# Patient Record
Sex: FEMALE | Race: WHITE | NOT HISPANIC OR LATINO
[De-identification: names, ages, dates, MRNs, and addresses within clinical notes are randomized per-mention and may not be internally consistent; named-entity substitution may affect disease eponyms.]

---

## 2017-01-31 ENCOUNTER — APPOINTMENT (OUTPATIENT)
Dept: OTOLARYNGOLOGY | Facility: CLINIC | Age: 62
End: 2017-01-31

## 2018-02-01 ENCOUNTER — INPATIENT (INPATIENT)
Facility: HOSPITAL | Age: 63
LOS: 1 days | Discharge: HOME | End: 2018-02-03
Payer: COMMERCIAL

## 2018-02-01 PROCEDURE — 99223 1ST HOSP IP/OBS HIGH 75: CPT

## 2018-02-01 PROCEDURE — 70487 CT MAXILLOFACIAL W/DYE: CPT | Mod: 26

## 2018-02-01 PROCEDURE — 99291 CRITICAL CARE FIRST HOUR: CPT

## 2018-02-01 PROCEDURE — 70450 CT HEAD/BRAIN W/O DYE: CPT | Mod: 26

## 2018-02-02 ENCOUNTER — OUTPATIENT (OUTPATIENT)
Dept: OUTPATIENT SERVICES | Facility: HOSPITAL | Age: 63
LOS: 1 days | End: 2018-02-02

## 2018-02-02 PROCEDURE — 70549 MR ANGIOGRAPH NECK W/O&W/DYE: CPT | Mod: 26

## 2018-02-02 PROCEDURE — 99232 SBSQ HOSP IP/OBS MODERATE 35: CPT

## 2018-02-02 PROCEDURE — 70553 MRI BRAIN STEM W/O & W/DYE: CPT | Mod: 26

## 2018-02-03 ENCOUNTER — OUTPATIENT (OUTPATIENT)
Dept: OUTPATIENT SERVICES | Facility: HOSPITAL | Age: 63
LOS: 1 days | End: 2018-02-03

## 2018-02-04 ENCOUNTER — EMERGENCY (EMERGENCY)
Facility: HOSPITAL | Age: 63
LOS: 1 days | End: 2018-02-04
Payer: COMMERCIAL

## 2018-02-04 PROCEDURE — 99285 EMERGENCY DEPT VISIT HI MDM: CPT

## 2018-02-04 PROCEDURE — 71275 CT ANGIOGRAPHY CHEST: CPT | Mod: 26

## 2018-02-04 PROCEDURE — 71046 X-RAY EXAM CHEST 2 VIEWS: CPT | Mod: 26

## 2018-02-10 ENCOUNTER — APPOINTMENT (OUTPATIENT)
Dept: OTOLARYNGOLOGY | Facility: CLINIC | Age: 63
End: 2018-02-10
Payer: COMMERCIAL

## 2018-02-10 DIAGNOSIS — H90.41 SENSORINEURAL HEARING LOSS, UNILATERAL, RIGHT EAR, WITH UNRESTRICTED HEARING ON THE CONTRALATERAL SIDE: ICD-10-CM

## 2018-02-10 DIAGNOSIS — H90.3 SENSORINEURAL HEARING LOSS, BILATERAL: ICD-10-CM

## 2018-02-10 DIAGNOSIS — R51 HEADACHE: ICD-10-CM

## 2018-02-10 PROCEDURE — 92557 COMPREHENSIVE HEARING TEST: CPT

## 2018-02-10 PROCEDURE — 92567 TYMPANOMETRY: CPT

## 2018-02-10 PROCEDURE — 99215 OFFICE O/P EST HI 40 MIN: CPT | Mod: 25

## 2018-02-11 PROBLEM — R51 RIGHT FACIAL PAIN: Status: ACTIVE | Noted: 2018-02-11

## 2018-02-11 RX ORDER — FLUOXETINE HYDROCHLORIDE 20 MG/1
20 CAPSULE ORAL
Qty: 30 | Refills: 0 | Status: ACTIVE | COMMUNITY
Start: 2018-01-26

## 2018-02-11 RX ORDER — LABETALOL HYDROCHLORIDE 100 MG/1
100 TABLET, FILM COATED ORAL
Qty: 30 | Refills: 0 | Status: ACTIVE | COMMUNITY
Start: 2017-12-14

## 2018-03-05 ENCOUNTER — APPOINTMENT (OUTPATIENT)
Dept: NEUROSURGERY | Facility: CLINIC | Age: 63
End: 2018-03-05
Payer: COMMERCIAL

## 2018-03-05 VITALS
TEMPERATURE: 98.4 F | WEIGHT: 168 LBS | HEIGHT: 67 IN | DIASTOLIC BLOOD PRESSURE: 116 MMHG | HEART RATE: 83 BPM | BODY MASS INDEX: 26.37 KG/M2 | SYSTOLIC BLOOD PRESSURE: 193 MMHG | RESPIRATION RATE: 18 BRPM | OXYGEN SATURATION: 97 %

## 2018-03-05 DIAGNOSIS — Z56.0 UNEMPLOYMENT, UNSPECIFIED: ICD-10-CM

## 2018-03-05 DIAGNOSIS — H91.93 UNSPECIFIED HEARING LOSS, BILATERAL: ICD-10-CM

## 2018-03-05 DIAGNOSIS — Z86.69 PERSONAL HISTORY OF OTHER DISEASES OF THE NERVOUS SYSTEM AND SENSE ORGANS: ICD-10-CM

## 2018-03-05 DIAGNOSIS — Z85.3 PERSONAL HISTORY OF MALIGNANT NEOPLASM OF BREAST: ICD-10-CM

## 2018-03-05 DIAGNOSIS — Z86.79 PERSONAL HISTORY OF OTHER DISEASES OF THE CIRCULATORY SYSTEM: ICD-10-CM

## 2018-03-05 DIAGNOSIS — Z78.9 OTHER SPECIFIED HEALTH STATUS: ICD-10-CM

## 2018-03-05 DIAGNOSIS — Z87.891 PERSONAL HISTORY OF NICOTINE DEPENDENCE: ICD-10-CM

## 2018-03-05 PROCEDURE — 99205 OFFICE O/P NEW HI 60 MIN: CPT

## 2018-03-05 SDOH — ECONOMIC STABILITY - INCOME SECURITY: UNEMPLOYMENT, UNSPECIFIED: Z56.0

## 2018-03-17 ENCOUNTER — INPATIENT (INPATIENT)
Facility: HOSPITAL | Age: 63
LOS: 1 days | Discharge: HOME | End: 2018-03-19
Payer: COMMERCIAL

## 2018-03-17 ENCOUNTER — OUTPATIENT (OUTPATIENT)
Dept: OUTPATIENT SERVICES | Facility: HOSPITAL | Age: 63
LOS: 1 days | End: 2018-03-17

## 2018-03-17 PROCEDURE — 76770 US EXAM ABDO BACK WALL COMP: CPT | Mod: 26

## 2018-03-17 PROCEDURE — 76856 US EXAM PELVIC COMPLETE: CPT | Mod: 26

## 2018-03-17 PROCEDURE — 71045 X-RAY EXAM CHEST 1 VIEW: CPT | Mod: 26

## 2018-03-17 PROCEDURE — 74176 CT ABD & PELVIS W/O CONTRAST: CPT | Mod: 26

## 2018-03-17 PROCEDURE — 99285 EMERGENCY DEPT VISIT HI MDM: CPT

## 2018-03-17 PROCEDURE — 70450 CT HEAD/BRAIN W/O DYE: CPT | Mod: 26

## 2018-03-18 ENCOUNTER — OUTPATIENT (OUTPATIENT)
Dept: OUTPATIENT SERVICES | Facility: HOSPITAL | Age: 63
LOS: 1 days | End: 2018-03-18

## 2018-03-19 ENCOUNTER — OUTPATIENT (OUTPATIENT)
Dept: OUTPATIENT SERVICES | Facility: HOSPITAL | Age: 63
LOS: 1 days | End: 2018-03-19

## 2018-03-29 ENCOUNTER — FORM ENCOUNTER (OUTPATIENT)
Age: 63
End: 2018-03-29

## 2018-03-30 ENCOUNTER — APPOINTMENT (OUTPATIENT)
Dept: MRI IMAGING | Facility: HOSPITAL | Age: 63
End: 2018-03-30

## 2018-03-30 ENCOUNTER — OUTPATIENT (OUTPATIENT)
Dept: OUTPATIENT SERVICES | Facility: HOSPITAL | Age: 63
LOS: 1 days | End: 2018-03-30
Payer: COMMERCIAL

## 2018-03-30 DIAGNOSIS — I67.1 CEREBRAL ANEURYSM, NONRUPTURED: ICD-10-CM

## 2018-03-30 LAB — CREAT SERPL-MCNC: 1.02 MG/DL — SIGNIFICANT CHANGE UP (ref 0.5–1.3)

## 2018-03-30 PROCEDURE — 70553 MRI BRAIN STEM W/O & W/DYE: CPT

## 2018-03-30 PROCEDURE — A9585: CPT

## 2018-03-30 PROCEDURE — 82565 ASSAY OF CREATININE: CPT

## 2018-03-30 PROCEDURE — 70553 MRI BRAIN STEM W/O & W/DYE: CPT | Mod: 26

## 2018-04-03 ENCOUNTER — OUTPATIENT (OUTPATIENT)
Dept: OUTPATIENT SERVICES | Facility: HOSPITAL | Age: 63
LOS: 1 days | End: 2018-04-03
Payer: COMMERCIAL

## 2018-04-03 VITALS
DIASTOLIC BLOOD PRESSURE: 100 MMHG | HEART RATE: 87 BPM | OXYGEN SATURATION: 99 % | SYSTOLIC BLOOD PRESSURE: 162 MMHG | WEIGHT: 165.35 LBS | TEMPERATURE: 98 F | HEIGHT: 67 IN | RESPIRATION RATE: 16 BRPM

## 2018-04-03 DIAGNOSIS — D33.3 BENIGN NEOPLASM OF CRANIAL NERVES: ICD-10-CM

## 2018-04-03 DIAGNOSIS — Z98.891 HISTORY OF UTERINE SCAR FROM PREVIOUS SURGERY: Chronic | ICD-10-CM

## 2018-04-03 DIAGNOSIS — Z01.818 ENCOUNTER FOR OTHER PREPROCEDURAL EXAMINATION: ICD-10-CM

## 2018-04-03 DIAGNOSIS — Z98.890 OTHER SPECIFIED POSTPROCEDURAL STATES: Chronic | ICD-10-CM

## 2018-04-03 DIAGNOSIS — I10 ESSENTIAL (PRIMARY) HYPERTENSION: ICD-10-CM

## 2018-04-03 LAB
ANION GAP SERPL CALC-SCNC: 23 MMOL/L — HIGH (ref 5–17)
BLD GP AB SCN SERPL QL: NEGATIVE — SIGNIFICANT CHANGE UP
BUN SERPL-MCNC: 11 MG/DL — SIGNIFICANT CHANGE UP (ref 7–23)
CALCIUM SERPL-MCNC: 9.1 MG/DL — SIGNIFICANT CHANGE UP (ref 8.4–10.5)
CHLORIDE SERPL-SCNC: 96 MMOL/L — SIGNIFICANT CHANGE UP (ref 96–108)
CO2 SERPL-SCNC: 21 MMOL/L — LOW (ref 22–31)
CREAT SERPL-MCNC: 0.85 MG/DL — SIGNIFICANT CHANGE UP (ref 0.5–1.3)
GLUCOSE SERPL-MCNC: 89 MG/DL — SIGNIFICANT CHANGE UP (ref 70–99)
HCT VFR BLD CALC: 39.2 % — SIGNIFICANT CHANGE UP (ref 34.5–45)
HGB BLD-MCNC: 12.9 G/DL — SIGNIFICANT CHANGE UP (ref 11.5–15.5)
MCHC RBC-ENTMCNC: 32.9 GM/DL — SIGNIFICANT CHANGE UP (ref 32–36)
MCHC RBC-ENTMCNC: 34.6 PG — HIGH (ref 27–34)
MCV RBC AUTO: 105.1 FL — HIGH (ref 80–100)
NRBC # BLD: 0 /100 WBCS — SIGNIFICANT CHANGE UP (ref 0–0)
PLATELET # BLD AUTO: 152 K/UL — SIGNIFICANT CHANGE UP (ref 150–400)
POTASSIUM SERPL-MCNC: 3.3 MMOL/L — LOW (ref 3.5–5.3)
POTASSIUM SERPL-SCNC: 3.3 MMOL/L — LOW (ref 3.5–5.3)
RBC # BLD: 3.73 M/UL — LOW (ref 3.8–5.2)
RBC # FLD: 13.7 % — SIGNIFICANT CHANGE UP (ref 10.3–14.5)
RH IG SCN BLD-IMP: POSITIVE — SIGNIFICANT CHANGE UP
SODIUM SERPL-SCNC: 140 MMOL/L — SIGNIFICANT CHANGE UP (ref 135–145)
WBC # BLD: 3.34 K/UL — LOW (ref 3.8–10.5)
WBC # FLD AUTO: 3.34 K/UL — LOW (ref 3.8–10.5)

## 2018-04-03 PROCEDURE — 80048 BASIC METABOLIC PNL TOTAL CA: CPT

## 2018-04-03 PROCEDURE — 86850 RBC ANTIBODY SCREEN: CPT

## 2018-04-03 PROCEDURE — 86900 BLOOD TYPING SEROLOGIC ABO: CPT

## 2018-04-03 PROCEDURE — G0463: CPT

## 2018-04-03 PROCEDURE — 86901 BLOOD TYPING SEROLOGIC RH(D): CPT

## 2018-04-03 PROCEDURE — 85027 COMPLETE CBC AUTOMATED: CPT

## 2018-04-03 RX ORDER — SODIUM CHLORIDE 9 MG/ML
3 INJECTION INTRAMUSCULAR; INTRAVENOUS; SUBCUTANEOUS EVERY 8 HOURS
Qty: 0 | Refills: 0 | Status: DISCONTINUED | OUTPATIENT
Start: 2018-04-05 | End: 2018-04-05

## 2018-04-03 RX ORDER — CEFAZOLIN SODIUM 1 G
2000 VIAL (EA) INJECTION ONCE
Qty: 0 | Refills: 0 | Status: DISCONTINUED | OUTPATIENT
Start: 2018-04-05 | End: 2018-04-07

## 2018-04-03 NOTE — H&P PST ADULT - PMH
Acoustic neuroma    Breast cancer in female    Essential hypertension Acoustic neuroma    Breast cancer in female    Essential hypertension    Hearing loss    Vestibular schwannoma

## 2018-04-03 NOTE — H&P PST ADULT - HISTORY OF PRESENT ILLNESS
63 year old Poor historian anxious female with PMH of HTN with complaints of hearing loss/ dizziness/ unsteady gait found to have  Acoustic neuroma planned for left retrosigmoid craniotomy trans petrosal approach and resection of acoustic neuroma, right retrosigmoid approach resection of vestibular schwannoma, decompression of internal auditory canal.       **** Patient with recent 2 day hospital stay at [facility] for syncopal episode due to dehydration as per patient. will obtain recent medical eval from PCP.   ***** Patient was scheduled for cardiology evaluation prior to surgery as per Dr. Jacobo' office, However patient not sure she will be able to make it to the appointment before office closes Today surgery scheduled for 4/5. 63 year old Poor historian anxious female with PMH of HTN with complaints of hearing loss/ dizziness/ unsteady gait found to have  Acoustic neuroma planned for left retrosigmoid craniotomy trans petrosal approach and resection of acoustic neuroma, right retrosigmoid approach resection of vestibular schwannoma, decompression of internal auditory canal.       **** Patient with recent 2 day hospital stay at [facility] for syncopal episode due to dehydration as per patient. will obtain recent medical eval from PCP.   ***** Patient was scheduled for cardiology evaluation today 4/3 prior to surgery as per Dr. Jacobo' office, However patient not sure she will be able to make it to the appointment before office closes Today surgery scheduled for 4/5.

## 2018-04-03 NOTE — H&P PST ADULT - PROBLEM SELECTOR PLAN 1
planned for left retrosigmoid craniotomy trans petrosal approach and resection of acoustic neuroma, right retrosigmoid approach resection of vestibular schwannoma, decompression of internal auditory canal.   PST labs send  Preprocedure instructions discussed

## 2018-04-03 NOTE — H&P PST ADULT - PROBLEM SELECTOR PLAN 2
continue Labetalol  patient /100 manually at PST, patient anxious , will obtain recent medical eval from PCP ( patient with recent hospital stay for syncopic episode) continue Labetalol  patient /96 manually at PST, patient anxious , will obtain recent medical eval from PCP ( patient with recent hospital stay for syncopic episode) continue Labetalol  patient /96 manually at [facility], patient anxious , will obtain recent medical eval from PCP in AM ( patient with recent hospital stay for syncopic episode)

## 2018-04-04 ENCOUNTER — APPOINTMENT (OUTPATIENT)
Dept: CV DIAGNOSITCS | Facility: HOSPITAL | Age: 63
End: 2018-04-04

## 2018-04-04 ENCOUNTER — TRANSCRIPTION ENCOUNTER (OUTPATIENT)
Age: 63
End: 2018-04-04

## 2018-04-04 ENCOUNTER — OUTPATIENT (OUTPATIENT)
Dept: OUTPATIENT SERVICES | Facility: HOSPITAL | Age: 63
LOS: 1 days | End: 2018-04-04
Payer: COMMERCIAL

## 2018-04-04 DIAGNOSIS — I25.10 ATHEROSCLEROTIC HEART DISEASE OF NATIVE CORONARY ARTERY WITHOUT ANGINA PECTORIS: ICD-10-CM

## 2018-04-04 DIAGNOSIS — Z98.891 HISTORY OF UTERINE SCAR FROM PREVIOUS SURGERY: Chronic | ICD-10-CM

## 2018-04-04 DIAGNOSIS — Z98.890 OTHER SPECIFIED POSTPROCEDURAL STATES: Chronic | ICD-10-CM

## 2018-04-04 PROBLEM — Z00.00 ENCOUNTER FOR PREVENTIVE HEALTH EXAMINATION: Noted: 2018-04-04

## 2018-04-04 PROCEDURE — 93306 TTE W/DOPPLER COMPLETE: CPT | Mod: 26

## 2018-04-04 PROCEDURE — C8929: CPT

## 2018-04-05 ENCOUNTER — RESULT REVIEW (OUTPATIENT)
Age: 63
End: 2018-04-05

## 2018-04-05 ENCOUNTER — APPOINTMENT (OUTPATIENT)
Dept: NEUROSURGERY | Facility: CLINIC | Age: 63
End: 2018-04-05

## 2018-04-05 ENCOUNTER — APPOINTMENT (OUTPATIENT)
Dept: OTOLARYNGOLOGY | Facility: HOSPITAL | Age: 63
End: 2018-04-05

## 2018-04-05 ENCOUNTER — INPATIENT (INPATIENT)
Facility: HOSPITAL | Age: 63
LOS: 4 days | Discharge: HOME | DRG: 25 | End: 2018-04-10
Attending: NEUROLOGICAL SURGERY | Admitting: NEUROLOGICAL SURGERY
Payer: COMMERCIAL

## 2018-04-05 VITALS
RESPIRATION RATE: 20 BRPM | WEIGHT: 158.07 LBS | OXYGEN SATURATION: 97 % | HEIGHT: 67 IN | SYSTOLIC BLOOD PRESSURE: 160 MMHG | HEART RATE: 82 BPM | DIASTOLIC BLOOD PRESSURE: 98 MMHG | TEMPERATURE: 98 F

## 2018-04-05 DIAGNOSIS — Z01.818 ENCOUNTER FOR OTHER PREPROCEDURAL EXAMINATION: ICD-10-CM

## 2018-04-05 DIAGNOSIS — D33.3 BENIGN NEOPLASM OF CRANIAL NERVES: ICD-10-CM

## 2018-04-05 DIAGNOSIS — Z98.891 HISTORY OF UTERINE SCAR FROM PREVIOUS SURGERY: Chronic | ICD-10-CM

## 2018-04-05 DIAGNOSIS — Z98.890 OTHER SPECIFIED POSTPROCEDURAL STATES: Chronic | ICD-10-CM

## 2018-04-05 LAB
ANION GAP SERPL CALC-SCNC: 22 MMOL/L — HIGH (ref 5–17)
APTT BLD: 26.8 SEC — LOW (ref 27.5–37.4)
BUN SERPL-MCNC: 7 MG/DL — SIGNIFICANT CHANGE UP (ref 7–23)
CALCIUM SERPL-MCNC: 9.1 MG/DL — SIGNIFICANT CHANGE UP (ref 8.4–10.5)
CHLORIDE SERPL-SCNC: 109 MMOL/L — HIGH (ref 96–108)
CO2 SERPL-SCNC: 16 MMOL/L — LOW (ref 22–31)
CREAT SERPL-MCNC: 0.99 MG/DL — SIGNIFICANT CHANGE UP (ref 0.5–1.3)
GAS PNL BLDA: SIGNIFICANT CHANGE UP
GAS PNL BLDA: SIGNIFICANT CHANGE UP
GLUCOSE SERPL-MCNC: 181 MG/DL — HIGH (ref 70–99)
HCT VFR BLD CALC: 37.5 % — SIGNIFICANT CHANGE UP (ref 34.5–45)
HGB BLD-MCNC: 13 G/DL — SIGNIFICANT CHANGE UP (ref 11.5–15.5)
INR BLD: 0.92 RATIO — SIGNIFICANT CHANGE UP (ref 0.88–1.16)
MAGNESIUM SERPL-MCNC: 1.3 MG/DL — LOW (ref 1.6–2.6)
MCHC RBC-ENTMCNC: 34.6 GM/DL — SIGNIFICANT CHANGE UP (ref 32–36)
MCHC RBC-ENTMCNC: 36.2 PG — HIGH (ref 27–34)
MCV RBC AUTO: 105 FL — HIGH (ref 80–100)
PHOSPHATE SERPL-MCNC: 5.5 MG/DL — HIGH (ref 2.5–4.5)
PLATELET # BLD AUTO: 125 K/UL — LOW (ref 150–400)
POTASSIUM SERPL-MCNC: 4.1 MMOL/L — SIGNIFICANT CHANGE UP (ref 3.5–5.3)
POTASSIUM SERPL-SCNC: 4.1 MMOL/L — SIGNIFICANT CHANGE UP (ref 3.5–5.3)
PROTHROM AB SERPL-ACNC: 10 SEC — SIGNIFICANT CHANGE UP (ref 9.8–12.7)
RBC # BLD: 3.59 M/UL — LOW (ref 3.8–5.2)
RBC # FLD: 12.6 % — SIGNIFICANT CHANGE UP (ref 10.3–14.5)
RH IG SCN BLD-IMP: POSITIVE — SIGNIFICANT CHANGE UP
SODIUM SERPL-SCNC: 147 MMOL/L — HIGH (ref 135–145)
WBC # BLD: 4.8 K/UL — SIGNIFICANT CHANGE UP (ref 3.8–10.5)
WBC # FLD AUTO: 4.8 K/UL — SIGNIFICANT CHANGE UP (ref 3.8–10.5)

## 2018-04-05 PROCEDURE — 88307 TISSUE EXAM BY PATHOLOGIST: CPT | Mod: 26

## 2018-04-05 PROCEDURE — 61781 SCAN PROC CRANIAL INTRA: CPT

## 2018-04-05 PROCEDURE — 61616 RESECT/EXCISE LESION SKULL: CPT | Mod: 22

## 2018-04-05 PROCEDURE — 70250 X-RAY EXAM OF SKULL: CPT | Mod: 26

## 2018-04-05 PROCEDURE — 61598 TRANSPETROSAL APPROACH/SKULL: CPT

## 2018-04-05 PROCEDURE — 99291 CRITICAL CARE FIRST HOUR: CPT

## 2018-04-05 PROCEDURE — 69960 RELEASE INNER EAR CANAL: CPT | Mod: RT

## 2018-04-05 RX ORDER — DOCUSATE SODIUM 100 MG
100 CAPSULE ORAL THREE TIMES A DAY
Qty: 0 | Refills: 0 | Status: DISCONTINUED | OUTPATIENT
Start: 2018-04-05 | End: 2018-04-10

## 2018-04-05 RX ORDER — CEFAZOLIN SODIUM 1 G
2000 VIAL (EA) INJECTION EVERY 8 HOURS
Qty: 0 | Refills: 0 | Status: COMPLETED | OUTPATIENT
Start: 2018-04-05 | End: 2018-04-06

## 2018-04-05 RX ORDER — OXYCODONE AND ACETAMINOPHEN 5; 325 MG/1; MG/1
2 TABLET ORAL EVERY 6 HOURS
Qty: 0 | Refills: 0 | Status: DISCONTINUED | OUTPATIENT
Start: 2018-04-05 | End: 2018-04-07

## 2018-04-05 RX ORDER — DEXTROSE MONOHYDRATE, SODIUM CHLORIDE, AND POTASSIUM CHLORIDE 50; .745; 4.5 G/1000ML; G/1000ML; G/1000ML
1000 INJECTION, SOLUTION INTRAVENOUS
Qty: 0 | Refills: 0 | Status: DISCONTINUED | OUTPATIENT
Start: 2018-04-05 | End: 2018-04-07

## 2018-04-05 RX ORDER — SENNA PLUS 8.6 MG/1
2 TABLET ORAL AT BEDTIME
Qty: 0 | Refills: 0 | Status: DISCONTINUED | OUTPATIENT
Start: 2018-04-05 | End: 2018-04-10

## 2018-04-05 RX ORDER — OXYCODONE AND ACETAMINOPHEN 5; 325 MG/1; MG/1
1 TABLET ORAL EVERY 4 HOURS
Qty: 0 | Refills: 0 | Status: DISCONTINUED | OUTPATIENT
Start: 2018-04-05 | End: 2018-04-07

## 2018-04-05 RX ORDER — DEXAMETHASONE 0.5 MG/5ML
4 ELIXIR ORAL EVERY 6 HOURS
Qty: 0 | Refills: 0 | Status: DISCONTINUED | OUTPATIENT
Start: 2018-04-05 | End: 2018-04-05

## 2018-04-05 RX ORDER — LABETALOL HCL 100 MG
100 TABLET ORAL
Qty: 0 | Refills: 0 | Status: DISCONTINUED | OUTPATIENT
Start: 2018-04-05 | End: 2018-04-06

## 2018-04-05 RX ORDER — LIDOCAINE HCL 20 MG/ML
0.2 VIAL (ML) INJECTION ONCE
Qty: 0 | Refills: 0 | Status: COMPLETED | OUTPATIENT
Start: 2018-04-05 | End: 2018-04-05

## 2018-04-05 RX ORDER — ACETAMINOPHEN 500 MG
650 TABLET ORAL EVERY 6 HOURS
Qty: 0 | Refills: 0 | Status: DISCONTINUED | OUTPATIENT
Start: 2018-04-05 | End: 2018-04-09

## 2018-04-05 RX ORDER — DEXAMETHASONE 0.5 MG/5ML
4 ELIXIR ORAL EVERY 6 HOURS
Qty: 0 | Refills: 0 | Status: DISCONTINUED | OUTPATIENT
Start: 2018-04-05 | End: 2018-04-06

## 2018-04-05 RX ORDER — FLUOXETINE HCL 10 MG
20 CAPSULE ORAL DAILY
Qty: 0 | Refills: 0 | Status: DISCONTINUED | OUTPATIENT
Start: 2018-04-05 | End: 2018-04-10

## 2018-04-05 RX ORDER — FLUOXETINE HCL 10 MG
1 CAPSULE ORAL
Qty: 0 | Refills: 0 | COMMUNITY

## 2018-04-05 RX ORDER — NICARDIPINE HYDROCHLORIDE 30 MG/1
2 CAPSULE, EXTENDED RELEASE ORAL
Qty: 40 | Refills: 0 | Status: DISCONTINUED | OUTPATIENT
Start: 2018-04-05 | End: 2018-04-07

## 2018-04-05 RX ADMIN — Medication 0.2 MILLILITER(S): at 06:45

## 2018-04-05 RX ADMIN — SODIUM CHLORIDE 3 MILLILITER(S): 9 INJECTION INTRAMUSCULAR; INTRAVENOUS; SUBCUTANEOUS at 06:45

## 2018-04-05 NOTE — PATIENT PROFILE ADULT. - PMH
Acoustic neuroma    Breast cancer in female    Essential hypertension    Hearing loss    Vestibular schwannoma

## 2018-04-05 NOTE — PROGRESS NOTE ADULT - SUBJECTIVE AND OBJECTIVE BOX
HPI:  63 year old Poor historian anxious female with PMH of HTN with complaints of hearing loss/ dizziness/ unsteady gait found to have  Acoustic neuroma planned for left retrosigmoid craniotomy trans petrosal approach and resection of acoustic neuroma, right retrosigmoid approach resection of vestibular schwannoma, decompression of internal auditory canal.       **** Patient with recent 2 day hospital stay at [facility] for syncopal episode due to dehydration as per patient. will obtain recent medical eval from PCP.   ***** Patient was scheduled for cardiology evaluation today 4/3 prior to surgery as per Dr. Jacobo' office, However patient not sure she will be able to make it to the appointment before office closes Today surgery scheduled for 4/5. (03 Apr 2018 17:33)    SURGERY: Acoustic neuroma resection: Right craniotomy for resection of vestiublar schwannoma        ICU Vital Signs Last 24 Hrs  T(C): 36.9 (05 Apr 2018 06:31), Max: 36.9 (05 Apr 2018 06:31)  T(F): 98.4 (05 Apr 2018 06:31), Max: 98.4 (05 Apr 2018 06:31)  HR: 82 (05 Apr 2018 06:31) (82 - 82)  BP: 160/98 (05 Apr 2018 06:31) (160/98 - 160/98)  BP(mean): --  ABP: --  ABP(mean): --  RR: 20 (05 Apr 2018 06:31) (20 - 20)  SpO2: 97% (05 Apr 2018 06:31) (97% - 97%)            ABG - ( 05 Apr 2018 17:37 )  pH: 7.38  /  pCO2: 27    /  pO2: 141   / HCO3: 16    / Base Excess: -7.4  /  SaO2: 99                       PHYSICAL EXAM:    General: No Acute Distress     Neurological: Awake, alert oriented to person, place and time, Following Commands, PERRL, EOMI, Face Symmetrical, Speech Fluent, Moving all extremities, Muscle Strength normal in all four extremities, No Drift, Sensation to Light Touch Intact    Pulmonary: Clear to Auscultation, No Rales, No Rhonchi, No Wheezes     Cardiovascular: S1, S2, Regular Rate and Rhythm     Gastrointestinal: Soft, Nontender, Nondistended     Extremities: No calf tenderness     Incision:       MEDICATIONS:  Antibiotics:   ceFAZolin   IVPB 2000 milliGRAM(s) IV Intermittent once  ceFAZolin   IVPB 2000 milliGRAM(s) IV Intermittent every 8 hours     Neurological:   acetaminophen   Tablet 650 milliGRAM(s) Oral every 6 hours PRN  acetaminophen   Tablet. 650 milliGRAM(s) Oral every 6 hours PRN  FLUoxetine 20 milliGRAM(s) Oral daily  oxyCODONE    5 mG/acetaminophen 325 mG 1 Tablet(s) Oral every 4 hours PRN  oxyCODONE    5 mG/acetaminophen 325 mG 2 Tablet(s) Oral every 6 hours PRN    Cardiac:   labetalol 100 milliGRAM(s) Oral two times a day  niCARdipine Infusion 2 mG/Hr IV Continuous <Continuous>    Pulm:    Heme:     Other:   dexamethasone  Injectable 4 milliGRAM(s) IV Push every 6 hours  docusate sodium 100 milliGRAM(s) Oral three times a day  senna 2 Tablet(s) Oral at bedtime  sodium chloride 0.9% with potassium chloride 20 mEq/L 1000 milliLiter(s) IV Continuous <Continuous>       DEVICES: [] Restraints [] MICHELLE/HMV []LD [] ET tube [] Trach [] Chest Tube [] A-line [] Schultz [] NGT [] Rectal Tube       A/P:    Acoustic neuroma planned for left retrosigmoid craniotomy trans petrosal approach and resection of acoustic neuroma, right retrosigmoid approach resection of vestibular schwannoma, decompression of internal auditory canal.       **** Patient with recent 2 day hospital stay at [facility] for syncopal episode due to dehydration as per patient. will obtain recent medical eval from PCP.   ***** Patient was scheduled for cardiology evaluation today 4/3 prior to surgery as per Dr. Jacobo' office, However patient not sure she will be able to make it to the appointment before office closes Today surgery scheduled for 4/5. (03 Apr 2018 17:33)      Neuro:   Respiratory:   CV:  Endocrine:   Heme/Onc:             DVT ppx:   Renal:   ID:   GI:   Social/Family:   Discharge planning:     Code Status: [] Full Code [] DNR [] DNI [] Goals of Care:   Disposition: [] ICU [] Stroke Unit [] RCU []PCU []Floor [] Discharge Home     Patient at high risk for neurologic deterioration, critical care time, excluding procedures: 45 minutes HPI:  63 year old Poor historian anxious female with PMH of HTN with complaints of hearing loss/ dizziness/ unsteady gait found to have  Acoustic neuroma planned for left retrosigmoid craniotomy trans petrosal approach and resection of acoustic neuroma, right retrosigmoid approach resection of vestibular schwannoma, decompression of internal auditory canal.       **** Patient with recent 2 day hospital stay at [facility] for syncopal episode due to dehydration as per patient. will obtain recent medical eval from PCP.   ***** Patient was scheduled for cardiology evaluation today 4/3 prior to surgery as per Dr. Jacobo' office, However patient not sure she will be able to make it to the appointment before office closes Today surgery scheduled for 4/5. (03 Apr 2018 17:33)    SURGERY: Acoustic neuroma resection: Right craniotomy for resection of vestiublar schwannoma        ICU Vital Signs Last 24 Hrs  T(C): 36.9 (05 Apr 2018 06:31), Max: 36.9 (05 Apr 2018 06:31)  T(F): 98.4 (05 Apr 2018 06:31), Max: 98.4 (05 Apr 2018 06:31)  HR: 82 (05 Apr 2018 06:31) (82 - 82)  BP: 160/98 (05 Apr 2018 06:31) (160/98 - 160/98)  BP(mean): --  ABP: --  ABP(mean): --  RR: 20 (05 Apr 2018 06:31) (20 - 20)  SpO2: 97% (05 Apr 2018 06:31) (97% - 97%)            ABG - ( 05 Apr 2018 17:37 )  pH: 7.38  /  pCO2: 27    /  pO2: 141   / HCO3: 16    / Base Excess: -7.4  /  SaO2: 99                       PHYSICAL EXAM:    General: No Acute Distress     Neurological: Awake, alert oriented , Following Commands, PERRL, EOMI with mild limitation in left gaze, eye closer is symmetric, intubated could not evaluate the lower facial ,  Moving all extremities, at least antigravity   Pulmonary: Intubated, Clear to Auscultation, No Rales, No Rhonchi, No Wheezes     Cardiovascular: S1, S2, Regular Rate and Rhythm     Gastrointestinal: Soft, Nontender, Nondistended     Extremities: No calf tenderness     Incision:       MEDICATIONS:  Antibiotics:   ceFAZolin   IVPB 2000 milliGRAM(s) IV Intermittent once  ceFAZolin   IVPB 2000 milliGRAM(s) IV Intermittent every 8 hours     Neurological:   acetaminophen   Tablet 650 milliGRAM(s) Oral every 6 hours PRN  acetaminophen   Tablet. 650 milliGRAM(s) Oral every 6 hours PRN  FLUoxetine 20 milliGRAM(s) Oral daily  oxyCODONE    5 mG/acetaminophen 325 mG 1 Tablet(s) Oral every 4 hours PRN  oxyCODONE    5 mG/acetaminophen 325 mG 2 Tablet(s) Oral every 6 hours PRN    Cardiac:   labetalol 100 milliGRAM(s) Oral two times a day  niCARdipine Infusion 2 mG/Hr IV Continuous <Continuous>    Pulm:    Heme:     Other:   dexamethasone  Injectable 4 milliGRAM(s) IV Push every 6 hours  docusate sodium 100 milliGRAM(s) Oral three times a day  senna 2 Tablet(s) Oral at bedtime  sodium chloride 0.9% with potassium chloride 20 mEq/L 1000 milliLiter(s) IV Continuous <Continuous>       DEVICES: [] Restraints [] MICHELLE/HMV []LD [] ET tube [] Trach [] Chest Tube [] A-line [] Schultz [] NGT [] Rectal Tube       A/P:    Acoustic neuroma  Right craniotomy for resection of vestibular schwannoma    Neuro: neuro checks q 1hr, CT head tomorrow 	  Respiratory: intubated she was placed on PS, she is taking good TV, if tolerated for 30 min, will extubate  CV:   Endocrine:   Heme/Onc:             DVT ppx:   Renal:   ID:   GI:   Social/Family:   Discharge planning:     Code Status: [] Full Code [] DNR [] DNI [] Goals of Care:   Disposition: [] ICU [] Stroke Unit [] RCU []PCU []Floor [] Discharge Home     Patient at high risk for neurologic deterioration, critical care time, excluding procedures: 45 minutes HPI:  63 year old Poor historian anxious female with PMH of HTN with complaints of hearing loss/ dizziness/ unsteady gait found to have  Acoustic neuroma planned for left retrosigmoid craniotomy trans petrosal approach and resection of acoustic neuroma, right retrosigmoid approach resection of vestibular schwannoma, decompression of internal auditory canal.       **** Patient with recent 2 day hospital stay at [facility] for syncopal episode due to dehydration as per patient. will obtain recent medical eval from PCP.   ***** Patient was scheduled for cardiology evaluation today 4/3 prior to surgery as per Dr. Jacobo' office, However patient not sure she will be able to make it to the appointment before office closes Today surgery scheduled for 4/5. (03 Apr 2018 17:33)    SURGERY: Acoustic neuroma resection: Right craniotomy for resection of vestiublar schwannoma        ICU Vital Signs Last 24 Hrs  T(C): 36.9 (05 Apr 2018 06:31), Max: 36.9 (05 Apr 2018 06:31)  T(F): 98.4 (05 Apr 2018 06:31), Max: 98.4 (05 Apr 2018 06:31)  HR: 82 (05 Apr 2018 06:31) (82 - 82)  BP: 160/98 (05 Apr 2018 06:31) (160/98 - 160/98)  BP(mean): --  ABP: --  ABP(mean): --  RR: 20 (05 Apr 2018 06:31) (20 - 20)  SpO2: 97% (05 Apr 2018 06:31) (97% - 97%)            ABG - ( 05 Apr 2018 17:37 )  pH: 7.38  /  pCO2: 27    /  pO2: 141   / HCO3: 16    / Base Excess: -7.4  /  SaO2: 99                       PHYSICAL EXAM:    General: No Acute Distress     Neurological: Awake, alert oriented , Following Commands, PERRL, EOMI with mild limitation in left gaze, eye closer is symmetric, intubated could not evaluate the lower facial ,  Moving all extremities, at least antigravity   Pulmonary: Intubated, Clear to Auscultation, No Rales, No Rhonchi, No Wheezes     Cardiovascular: S1, S2, Regular Rate and Rhythm     Gastrointestinal: Soft, Nontender, Nondistended     Extremities: No calf tenderness     Incision:       MEDICATIONS:  Antibiotics:   ceFAZolin   IVPB 2000 milliGRAM(s) IV Intermittent once  ceFAZolin   IVPB 2000 milliGRAM(s) IV Intermittent every 8 hours     Neurological:   acetaminophen   Tablet 650 milliGRAM(s) Oral every 6 hours PRN  acetaminophen   Tablet. 650 milliGRAM(s) Oral every 6 hours PRN  FLUoxetine 20 milliGRAM(s) Oral daily  oxyCODONE    5 mG/acetaminophen 325 mG 1 Tablet(s) Oral every 4 hours PRN  oxyCODONE    5 mG/acetaminophen 325 mG 2 Tablet(s) Oral every 6 hours PRN    Cardiac:   labetalol 100 milliGRAM(s) Oral two times a day  niCARdipine Infusion 2 mG/Hr IV Continuous <Continuous>    Pulm:    Heme:     Other:   dexamethasone  Injectable 4 milliGRAM(s) IV Push every 6 hours  docusate sodium 100 milliGRAM(s) Oral three times a day  senna 2 Tablet(s) Oral at bedtime  sodium chloride 0.9% with potassium chloride 20 mEq/L 1000 milliLiter(s) IV Continuous <Continuous>       DEVICES: [] Restraints [] MICHELLE/HMV []LD [] ET tube [] Trach [] Chest Tube [] A-line [] Schultz [] NGT [] Rectal Tube       A/P:    Acoustic neuroma  Right craniotomy for resection of vestibular schwannoma    Neuro: neuro checks q 1hr, CT head tomorrow 	  Respiratory: intubated she was placed on PS, she is taking good TV, if tolerated for 30 min, will extubate  CV: NS 75 ml/hr, - 150 mmhg   Endocrine: finger sticks q 6 hrs, ISS, sugar goal 120-180   Heme/Onc:   keep INR< 1.5, plt >100          DVT ppx: SCD, POD 0 so will hold off on lovenox DVT prophylaxis   Renal: NS 75 ml/hr   ID: periop ancef   GI: NPO   Social/Family: ICU   Discharge planning: ICU    Code Status: [x] Full Code [] DNR [] DNI [] Goals of Care:   Disposition: [x] ICU [] Stroke Unit [] RCU []PCU []Floor [] Discharge Home     Patient at high risk for neurologic deterioration, critical care time, excluding procedures: 45 minutes HPI:  63 year old Poor historian anxious female with PMH of HTN with complaints of hearing loss/ dizziness/ unsteady gait found to have  Acoustic neuroma planned for left retrosigmoid craniotomy trans petrosal approach and resection of acoustic neuroma, right retrosigmoid approach resection of vestibular schwannoma, decompression of internal auditory canal.       **** Patient with recent 2 day hospital stay at [facility] for syncopal episode due to dehydration as per patient. will obtain recent medical eval from PCP.   ***** Patient was scheduled for cardiology evaluation today 4/3 prior to surgery as per Dr. Jacobo' office, However patient not sure she will be able to make it to the appointment before office closes Today surgery scheduled for 4/5. (03 Apr 2018 17:33)    SURGERY: Acoustic neuroma resection: Right craniotomy for resection of vestiublar schwannoma        ICU Vital Signs Last 24 Hrs  T(C): 36.9 (05 Apr 2018 06:31), Max: 36.9 (05 Apr 2018 06:31)  T(F): 98.4 (05 Apr 2018 06:31), Max: 98.4 (05 Apr 2018 06:31)  HR: 82 (05 Apr 2018 06:31) (82 - 82)  BP: 160/98 (05 Apr 2018 06:31) (160/98 - 160/98)  BP(mean): --  ABP: --  ABP(mean): --  RR: 20 (05 Apr 2018 06:31) (20 - 20)  SpO2: 97% (05 Apr 2018 06:31) (97% - 97%)            ABG - ( 05 Apr 2018 17:37 )  pH: 7.38  /  pCO2: 27    /  pO2: 141   / HCO3: 16    / Base Excess: -7.4  /  SaO2: 99                       PHYSICAL EXAM:    General: No Acute Distress     Neurological: Awake, alert oriented , Following Commands, PERRL, EOMI with mild limitation in left gaze, eye closer is symmetric, intubated could not evaluate the lower facial ,  Moving all extremities, at least antigravity   Pulmonary: Intubated, Clear to Auscultation, No Rales, No Rhonchi, No Wheezes     Cardiovascular: S1, S2, Regular Rate and Rhythm     Gastrointestinal: Soft, Nontender, Nondistended     Extremities: No calf tenderness     Incision:       MEDICATIONS:  Antibiotics:   ceFAZolin   IVPB 2000 milliGRAM(s) IV Intermittent once  ceFAZolin   IVPB 2000 milliGRAM(s) IV Intermittent every 8 hours     Neurological:   acetaminophen   Tablet 650 milliGRAM(s) Oral every 6 hours PRN  acetaminophen   Tablet. 650 milliGRAM(s) Oral every 6 hours PRN  FLUoxetine 20 milliGRAM(s) Oral daily  oxyCODONE    5 mG/acetaminophen 325 mG 1 Tablet(s) Oral every 4 hours PRN  oxyCODONE    5 mG/acetaminophen 325 mG 2 Tablet(s) Oral every 6 hours PRN    Cardiac:   labetalol 100 milliGRAM(s) Oral two times a day  niCARdipine Infusion 2 mG/Hr IV Continuous <Continuous>    Pulm:    Heme:     Other:   dexamethasone  Injectable 4 milliGRAM(s) IV Push every 6 hours  docusate sodium 100 milliGRAM(s) Oral three times a day  senna 2 Tablet(s) Oral at bedtime  sodium chloride 0.9% with potassium chloride 20 mEq/L 1000 milliLiter(s) IV Continuous <Continuous>       DEVICES: [] Restraints [] MICHELLE/HMV []LD [] ET tube [] Trach [] Chest Tube [] A-line [] Schultz [] NGT [] Rectal Tube       A/P:    Acoustic neuroma  Right craniotomy for resection of vestibular schwannoma    Neuro: neuro checks q 1hr, CT head tomorrow, MRI brain wwo in 48 hrs, watch for CSF leak , decadron 4 mg q 6 hrs per NS   Respiratory: intubated she was placed on PS, she is taking good TV, if tolerated for 30 min, will extubate  CV: NS 75 ml/hr, - 150 mmhg   Endocrine: finger sticks q 6 hrs, ISS, sugar goal 120-180   Heme/Onc:   keep INR< 1.5, plt >100          DVT ppx: SCD, POD 0 so will hold off on lovenox DVT prophylaxis   Renal: NS 75 ml/hr   ID: periop ancef   GI: NPO   Social/Family: ICU   Discharge planning: ICU    Code Status: [x] Full Code [] DNR [] DNI [] Goals of Care:   Disposition: [x] ICU [] Stroke Unit [] RCU []PCU []Floor [] Discharge Home     Patient at high risk for neurologic deterioration, critical care time, excluding procedures: 45 minutes

## 2018-04-05 NOTE — PRE-OP CHECKLIST - BLOOD AVAILABLE
Pt in clinic for BMP after cath 6/13/17 s/p SUSAN to large OM3. Told the CMA drawing lab he is experiencing symptoms. CMA called RN who then spoke w/ pt. Pt states in the last 1-2 days has had chest pain that goes across his chest and upper back between shoulder blades, makes it hard to breathe, has palpitations more than normal. Pt thinks his chest pain feels similar to how it was prior to his stents. Pt is scheduled to see Dr. Mckeon for f/u 6/22/17 however concerned about his symptoms. Advised to go to ED and if not, that we can schedule sooner with a different cardiologist if needed. Pt's son drove him today and is agreeable to be seen in ED today.   Pt hx Afib, multple PCI to LAD, and newly SUSAN to OM3, recent hx stroke as well.   Jaquan RICE     yes

## 2018-04-05 NOTE — BRIEF OPERATIVE NOTE - PROCEDURE
<<-----Click on this checkbox to enter Procedure Acoustic neuroma resection  04/05/2018  Right craniotomy for resection of vestiublar schwannoma  Active  PANDAAN

## 2018-04-06 LAB
ACETONE SERPL-MCNC: ABNORMAL
ANION GAP SERPL CALC-SCNC: 15 MMOL/L — SIGNIFICANT CHANGE UP (ref 5–17)
ANION GAP SERPL CALC-SCNC: 17 MMOL/L — SIGNIFICANT CHANGE UP (ref 5–17)
BASE EXCESS BLDA CALC-SCNC: -0.1 MMOL/L — SIGNIFICANT CHANGE UP (ref -2–2)
BUN SERPL-MCNC: 10 MG/DL — SIGNIFICANT CHANGE UP (ref 7–23)
BUN SERPL-MCNC: 9 MG/DL — SIGNIFICANT CHANGE UP (ref 7–23)
CALCIUM SERPL-MCNC: 8.3 MG/DL — LOW (ref 8.4–10.5)
CALCIUM SERPL-MCNC: 8.7 MG/DL — SIGNIFICANT CHANGE UP (ref 8.4–10.5)
CHLORIDE SERPL-SCNC: 100 MMOL/L — SIGNIFICANT CHANGE UP (ref 96–108)
CHLORIDE SERPL-SCNC: 102 MMOL/L — SIGNIFICANT CHANGE UP (ref 96–108)
CO2 BLDA-SCNC: 24 MMOL/L — SIGNIFICANT CHANGE UP (ref 22–30)
CO2 SERPL-SCNC: 21 MMOL/L — LOW (ref 22–31)
CO2 SERPL-SCNC: 24 MMOL/L — SIGNIFICANT CHANGE UP (ref 22–31)
CREAT SERPL-MCNC: 0.72 MG/DL — SIGNIFICANT CHANGE UP (ref 0.5–1.3)
CREAT SERPL-MCNC: 0.79 MG/DL — SIGNIFICANT CHANGE UP (ref 0.5–1.3)
GAS PNL BLDA: SIGNIFICANT CHANGE UP
GAS PNL BLDA: SIGNIFICANT CHANGE UP
GLUCOSE BLDC GLUCOMTR-MCNC: 147 MG/DL — HIGH (ref 70–99)
GLUCOSE BLDC GLUCOMTR-MCNC: 171 MG/DL — HIGH (ref 70–99)
GLUCOSE BLDC GLUCOMTR-MCNC: 172 MG/DL — HIGH (ref 70–99)
GLUCOSE BLDC GLUCOMTR-MCNC: 177 MG/DL — HIGH (ref 70–99)
GLUCOSE BLDC GLUCOMTR-MCNC: 260 MG/DL — HIGH (ref 70–99)
GLUCOSE SERPL-MCNC: 202 MG/DL — HIGH (ref 70–99)
GLUCOSE SERPL-MCNC: 338 MG/DL — HIGH (ref 70–99)
HBA1C BLD-MCNC: 4.7 % — SIGNIFICANT CHANGE UP (ref 4–5.6)
HCO3 BLDA-SCNC: 23 MMOL/L — SIGNIFICANT CHANGE UP (ref 21–29)
HCT VFR BLD CALC: 40.9 % — SIGNIFICANT CHANGE UP (ref 34.5–45)
HGB BLD-MCNC: 14.1 G/DL — SIGNIFICANT CHANGE UP (ref 11.5–15.5)
HOROWITZ INDEX BLDA+IHG-RTO: 21 — SIGNIFICANT CHANGE UP
MAGNESIUM SERPL-MCNC: 1.7 MG/DL — SIGNIFICANT CHANGE UP (ref 1.6–2.6)
MCHC RBC-ENTMCNC: 34.4 GM/DL — SIGNIFICANT CHANGE UP (ref 32–36)
MCHC RBC-ENTMCNC: 35.9 PG — HIGH (ref 27–34)
MCV RBC AUTO: 104 FL — HIGH (ref 80–100)
PCO2 BLDA: 35 MMHG — SIGNIFICANT CHANGE UP (ref 32–46)
PH BLDA: 7.44 — SIGNIFICANT CHANGE UP (ref 7.35–7.45)
PHOSPHATE SERPL-MCNC: 1.7 MG/DL — LOW (ref 2.5–4.5)
PLATELET # BLD AUTO: 137 K/UL — LOW (ref 150–400)
PO2 BLDA: 65 MMHG — LOW (ref 74–108)
POTASSIUM SERPL-MCNC: 3.3 MMOL/L — LOW (ref 3.5–5.3)
POTASSIUM SERPL-MCNC: 5.5 MMOL/L — HIGH (ref 3.5–5.3)
POTASSIUM SERPL-SCNC: 3.3 MMOL/L — LOW (ref 3.5–5.3)
POTASSIUM SERPL-SCNC: 5.5 MMOL/L — HIGH (ref 3.5–5.3)
RBC # BLD: 3.92 M/UL — SIGNIFICANT CHANGE UP (ref 3.8–5.2)
RBC # FLD: 12.8 % — SIGNIFICANT CHANGE UP (ref 10.3–14.5)
SAO2 % BLDA: 93 % — SIGNIFICANT CHANGE UP (ref 92–96)
SODIUM SERPL-SCNC: 139 MMOL/L — SIGNIFICANT CHANGE UP (ref 135–145)
SODIUM SERPL-SCNC: 140 MMOL/L — SIGNIFICANT CHANGE UP (ref 135–145)
WBC # BLD: 9.9 K/UL — SIGNIFICANT CHANGE UP (ref 3.8–10.5)
WBC # FLD AUTO: 9.9 K/UL — SIGNIFICANT CHANGE UP (ref 3.8–10.5)

## 2018-04-06 PROCEDURE — 70450 CT HEAD/BRAIN W/O DYE: CPT | Mod: 26

## 2018-04-06 PROCEDURE — 93970 EXTREMITY STUDY: CPT | Mod: 26

## 2018-04-06 PROCEDURE — 99233 SBSQ HOSP IP/OBS HIGH 50: CPT

## 2018-04-06 RX ORDER — DEXAMETHASONE 0.5 MG/5ML
6 ELIXIR ORAL ONCE
Qty: 0 | Refills: 0 | Status: COMPLETED | OUTPATIENT
Start: 2018-04-06 | End: 2018-04-06

## 2018-04-06 RX ORDER — NICOTINE POLACRILEX 2 MG
1 GUM BUCCAL DAILY
Qty: 0 | Refills: 0 | Status: DISCONTINUED | OUTPATIENT
Start: 2018-04-06 | End: 2018-04-06

## 2018-04-06 RX ORDER — PANTOPRAZOLE SODIUM 20 MG/1
40 TABLET, DELAYED RELEASE ORAL DAILY
Qty: 0 | Refills: 0 | Status: DISCONTINUED | OUTPATIENT
Start: 2018-04-06 | End: 2018-04-10

## 2018-04-06 RX ORDER — DEXAMETHASONE 0.5 MG/5ML
6 ELIXIR ORAL EVERY 6 HOURS
Qty: 0 | Refills: 0 | Status: DISCONTINUED | OUTPATIENT
Start: 2018-04-06 | End: 2018-04-09

## 2018-04-06 RX ORDER — HUMAN INSULIN 100 [IU]/ML
4 INJECTION, SUSPENSION SUBCUTANEOUS EVERY 8 HOURS
Qty: 0 | Refills: 0 | Status: DISCONTINUED | OUTPATIENT
Start: 2018-04-06 | End: 2018-04-07

## 2018-04-06 RX ORDER — ACETAMINOPHEN 500 MG
1000 TABLET ORAL ONCE
Qty: 0 | Refills: 0 | Status: COMPLETED | OUTPATIENT
Start: 2018-04-06 | End: 2018-04-06

## 2018-04-06 RX ORDER — MAGNESIUM SULFATE 500 MG/ML
2 VIAL (ML) INJECTION ONCE
Qty: 0 | Refills: 0 | Status: COMPLETED | OUTPATIENT
Start: 2018-04-06 | End: 2018-04-06

## 2018-04-06 RX ORDER — LABETALOL HCL 100 MG
100 TABLET ORAL
Qty: 0 | Refills: 0 | Status: DISCONTINUED | OUTPATIENT
Start: 2018-04-06 | End: 2018-04-07

## 2018-04-06 RX ORDER — INSULIN LISPRO 100/ML
VIAL (ML) SUBCUTANEOUS EVERY 6 HOURS
Qty: 0 | Refills: 0 | Status: DISCONTINUED | OUTPATIENT
Start: 2018-04-06 | End: 2018-04-07

## 2018-04-06 RX ADMIN — PANTOPRAZOLE SODIUM 40 MILLIGRAM(S): 20 TABLET, DELAYED RELEASE ORAL at 12:11

## 2018-04-06 RX ADMIN — Medication 100 MILLIGRAM(S): at 13:28

## 2018-04-06 RX ADMIN — Medication 6 MILLIGRAM(S): at 17:03

## 2018-04-06 RX ADMIN — OXYCODONE AND ACETAMINOPHEN 1 TABLET(S): 5; 325 TABLET ORAL at 13:15

## 2018-04-06 RX ADMIN — Medication 6 MILLIGRAM(S): at 12:10

## 2018-04-06 RX ADMIN — Medication 6: at 13:28

## 2018-04-06 RX ADMIN — OXYCODONE AND ACETAMINOPHEN 2 TABLET(S): 5; 325 TABLET ORAL at 23:00

## 2018-04-06 RX ADMIN — Medication 6 MILLIGRAM(S): at 23:46

## 2018-04-06 RX ADMIN — HUMAN INSULIN 4 UNIT(S): 100 INJECTION, SUSPENSION SUBCUTANEOUS at 13:29

## 2018-04-06 RX ADMIN — Medication 1000 MILLIGRAM(S): at 04:00

## 2018-04-06 RX ADMIN — Medication 100 MILLIGRAM(S): at 05:07

## 2018-04-06 RX ADMIN — Medication 2: at 05:14

## 2018-04-06 RX ADMIN — Medication 400 MILLIGRAM(S): at 03:30

## 2018-04-06 RX ADMIN — OXYCODONE AND ACETAMINOPHEN 1 TABLET(S): 5; 325 TABLET ORAL at 17:15

## 2018-04-06 RX ADMIN — Medication 650 MILLIGRAM(S): at 09:19

## 2018-04-06 RX ADMIN — Medication 100 MILLIGRAM(S): at 17:03

## 2018-04-06 RX ADMIN — Medication 6 MILLIGRAM(S): at 05:06

## 2018-04-06 RX ADMIN — Medication 50 GRAM(S): at 00:17

## 2018-04-06 RX ADMIN — Medication 650 MILLIGRAM(S): at 09:49

## 2018-04-06 RX ADMIN — DEXTROSE MONOHYDRATE, SODIUM CHLORIDE, AND POTASSIUM CHLORIDE 110 MILLILITER(S): 50; .745; 4.5 INJECTION, SOLUTION INTRAVENOUS at 12:09

## 2018-04-06 RX ADMIN — Medication 6 MILLIGRAM(S): at 03:29

## 2018-04-06 RX ADMIN — OXYCODONE AND ACETAMINOPHEN 2 TABLET(S): 5; 325 TABLET ORAL at 22:09

## 2018-04-06 RX ADMIN — Medication 20 MILLIGRAM(S): at 12:10

## 2018-04-06 RX ADMIN — Medication 4 MILLIGRAM(S): at 00:17

## 2018-04-06 RX ADMIN — OXYCODONE AND ACETAMINOPHEN 1 TABLET(S): 5; 325 TABLET ORAL at 12:44

## 2018-04-06 RX ADMIN — HUMAN INSULIN 4 UNIT(S): 100 INJECTION, SUSPENSION SUBCUTANEOUS at 22:14

## 2018-04-06 RX ADMIN — OXYCODONE AND ACETAMINOPHEN 1 TABLET(S): 5; 325 TABLET ORAL at 16:44

## 2018-04-06 RX ADMIN — Medication 2: at 18:08

## 2018-04-06 RX ADMIN — DEXTROSE MONOHYDRATE, SODIUM CHLORIDE, AND POTASSIUM CHLORIDE 110 MILLILITER(S): 50; .745; 4.5 INJECTION, SOLUTION INTRAVENOUS at 17:03

## 2018-04-06 RX ADMIN — Medication 1 APPLICATION(S): at 12:10

## 2018-04-06 NOTE — CONSULT NOTE ADULT - SUBJECTIVE AND OBJECTIVE BOX
Patient is a 63y old  Female who presents for acoustic neuroma removal (2018 06:29)      HPI:  63 year old  female with PMH of HTN with complaints of hearing loss/ dizziness/ unsteady gait found to have  Acoustic neuroma planned for left retrosigmoid craniotomy trans petrosal approach and resection of acoustic neuroma, right retrosigmoid approach resection of vestibular schwannoma, decompression of internal auditory canal.              *****PAST MEDICAL / Surgical  HISTORY:  PAST MEDICAL & SURGICAL HISTORY:  Vestibular schwannoma  Hearing loss  Breast cancer in female  Acoustic neuroma  Essential hypertension  H/O: : x2  History of lumpectomy of right breast           *****FAMILY HISTORY:  FAMILY HISTORY:           *****SOCIAL HISTORY:  Alcohol: None  Smoking: None         *****ALLERGIES:   Allergies    No Known Allergies    Intolerances             *****MEDICATIONS:  MEDICATIONS  (STANDING):  ceFAZolin   IVPB 2000 milliGRAM(s) IV Intermittent once  ceFAZolin   IVPB 2000 milliGRAM(s) IV Intermittent every 8 hours  dexamethasone  Injectable 6 milliGRAM(s) IV Push every 6 hours  docusate sodium 100 milliGRAM(s) Oral three times a day  FLUoxetine 20 milliGRAM(s) Oral daily  insulin lispro (HumaLOG) corrective regimen sliding scale   SubCutaneous every 6 hours  labetalol 100 milliGRAM(s) Oral two times a day  niCARdipine Infusion 2 mG/Hr (10 mL/Hr) IV Continuous <Continuous>  nicotine -  14 mG/24Hr(s) Patch 1 patch Transdermal daily  pantoprazole  Injectable 40 milliGRAM(s) IV Push daily  senna 2 Tablet(s) Oral at bedtime  sodium chloride 0.9% with potassium chloride 20 mEq/L 1000 milliLiter(s) (110 mL/Hr) IV Continuous <Continuous>    MEDICATIONS  (PRN):  acetaminophen   Tablet 650 milliGRAM(s) Oral every 6 hours PRN For Temp greater than 38 C (100.4 F)  acetaminophen   Tablet. 650 milliGRAM(s) Oral every 6 hours PRN Mild Pain (1 - 3)  oxyCODONE    5 mG/acetaminophen 325 mG 1 Tablet(s) Oral every 4 hours PRN Moderate Pain  oxyCODONE    5 mG/acetaminophen 325 mG 2 Tablet(s) Oral every 6 hours PRN Severe Pain           *****REVIEW OF SYSTEM:  GEN: no fever, no chills, no pain  RESP: no SOB, no cough, no sputum  CVS: no chest pain, no palpitations, no edema  GI: no abdominal pain, no nausea, no vomiting, no constipation, no diarrhea  : no dysurea, no frequency, no hematurea  Neuro: no headache, no dizziness  PSYCH: no anxiety, no depression  Derm : no itching, no rash         *****VITAL SIGNS:  T(C): 36.9 (18 @ 07:00), Max: 36.9 (18 @ 07:00)  HR: 88 (18 @ 09:00) (79 - 110)  BP: --  RR: 18 (18 @ 09:00) (10 - 24)  SpO2: 91% (18 @ 09:00) (91% - 100%)  Wt(kg): --     @ 07:01  -   @ 07:00  --------------------------------------------------------  IN: 1205 mL / OUT: 2100 mL / NET: -895 mL     @ 07:01  -   @ 09:54  --------------------------------------------------------  IN: 220 mL / OUT: 400 mL / NET: -180 mL             *****PHYSICAL EXAM:  GEN: A&O X 3 , NAD , comfortable  HEENT: NCAT, PERRL, MMM, hearing intact  Neck: supple , no JVD  CVS: S1S2 , regular , No M/R/G appreciated  PULM: CTA B/L,  no W/R/R appreciated  ABD.: soft. non tender, non distended,  bowel sounds present  Extrem: intact pulses , no edema   Derm: No rash , no ecchymoses  PSYCH : normal mood,  no delusion not anxious         *****LAB AND IMAGIN.0   4.8   )-----------( 125      ( 2018 23:21 )             37.5               04-05    147<H>  |  109<H>  |  7   ----------------------------<  181<H>  4.1   |  16<L>  |  0.99    Ca    9.1      2018 23:21  Phos  5.5     04-05  Mg     1.3     04-05      PT/INR - ( 2018 06:34 )   PT: 10.0 sec;   INR: 0.92 ratio         PTT - ( 2018 06:34 )  PTT:26.8 sec                      ABG - ( 2018 00:27 )  pH: 7.28  /  pCO2: 39    /  pO2: 96    / HCO3: 18    / Base Excess: -7.7  /  SaO2: 97            [All pertinent recent Imaging reports reviewed]  < from: TTE with Doppler (w/Cont) (18 @ 13:41) >  Conclusions:  1. Normal left ventricular internal dimensions and wall  thicknesses.  2. Normal left ventricular systolic function.   Endocardial  visualization enhanced with intravenous injection of echo  contrast (Definity).  3. Agitated saline injection demonstrates no evidence of a  patent foramen ovale.    < end of copied text >           *****A S S E S S M E N T   A N D   P L A N :  63F s/p R crani for resection of likely vestibular schwannoma 18  post op care per NSx/NSCU  extubated this morning, appears comfortable  VSS  cont current meds  f/u MRI  recent echo noted    ___________________________  Will follow with you.  Thank you,  OLENA Fish D.O.

## 2018-04-06 NOTE — PROGRESS NOTE ADULT - SUBJECTIVE AND OBJECTIVE BOX
HPI:  63 year old Poor historian anxious female with PMH of HTN with complaints of hearing loss/ dizziness/ unsteady gait found to have  Acoustic neuroma planned for left retrosigmoid craniotomy trans petrosal approach and resection of acoustic neuroma, right retrosigmoid approach resection of vestibular schwannoma, decompression of internal auditory canal.     SURGERY: Acoustic neuroma resection: Right craniotomy for resection of vestiublar schwannoma 4/5.    Vitals labs and imaging reviewed.    PHYSICAL EXAM:  General: No Acute Distress   Neurological: Awake, alert oriented , Following Commands, PERRL, EOMI with mild limitation in left gaze, eye closer is symmetric, intubated could not evaluate the lower facial ,  Moving all extremities, at least antigravity     Pulmonary: Clear to Auscultation, No Rales, No Rhonchi, No Wheezes     Cardiovascular: S1, S2, Regular Rate and Rhythm     Gastrointestinal: Soft, Nontender, Nondistended     Extremities: No calf tenderness SUMMARY:  63 year old Poor historian anxious female with PMH of breast cancer and HTN with complaints of hearing loss/ dizziness/ unsteady gait found to have  Acoustic neuroma underwent left retrosigmoid craniotomy for resection of acoustic neuroma on 4/5/18.    SURGERY: Acoustic neuroma resection: Right craniotomy for resection of vestiublar schwannoma 4/5.    24 HOUR EVENTS:  Admitted to NSCU. Extubated. Weaned off nicardipine gtt overnight.    VITALS/DATA/ORDERS: Reviewed    PHYSICAL EXAM:  General: No Acute Distress   Neurological: A+Ox3, follows, right facial, decreased sensation of right face, no drift, full strength  Pulmonary: Clear to Auscultation, No Rales, No Rhonchi, No Wheezes   Cardiovascular: S1, S2, Regular Rate and Rhythm   Gastrointestinal: Soft, Nontender, Nondistended   Extremities: No calf tenderness SUMMARY:  63 year old Poor historian anxious female with PMH of breast cancer and HTN with complaints of hearing loss/ dizziness/ unsteady gait found to have  Acoustic neuroma underwent left retrosigmoid craniotomy for resection of acoustic neuroma on 4/5/18.    SURGERY: Acoustic neuroma resection: Right craniotomy for resection of vestiublar schwannoma 4/5.    24 HOUR EVENTS:  Admitted to NSCU. Extubated. Weaned off nicardipine gtt overnight.    VITALS/DATA/ORDERS: Reviewed    PHYSICAL EXAM:  General: No Acute Distress   Neurological: A+Ox3, follows, right facial (complete eye closure, HB 3), decreased sensation of right face, no drift, full strength  Pulmonary: Clear to Auscultation, No Rales, No Rhonchi, No Wheezes   Cardiovascular: S1, S2, Regular Rate and Rhythm   Gastrointestinal: Soft, Nontender, Nondistended   Extremities: No calf tenderness

## 2018-04-06 NOTE — AIRWAY REMOVAL NOTE  ADULT & PEDS - ARTIFICAL AIRWAY REMOVAL COMMENTS
Written order for extubation verified. The patient was identified by full name and birth date compared to the identification band. Present during the procedure was add: CARLIE Moore

## 2018-04-06 NOTE — PROGRESS NOTE ADULT - ASSESSMENT
63F s/p R crani for resection of likely vestibular schwannoma 4/5/18    -CT in AM  -Neurochecks q1h  -Pain control  -Wean to extubate  -Decadron 4q6  -MRI brain pending

## 2018-04-06 NOTE — PROGRESS NOTE ADULT - ASSESSMENT
A/P:   Acoustic neuroma s/p Right craniotomy for resection of vestibular schwannoma 4/5.    Neuro:   neuro checks q 1hr,   CT head am, MRI brain w/wo in 48 hrs,  watch for CSF leak ,   decadron 4 mg q 6 hrs per NSx    Respiratory: extubated, tolerates RA.    CV: - 150 mmhg     Endocrine: finger sticks q 6 hrs, ISS, sugar goal 120-180     Heme/Onc:   keep INR< 1.5, plt >100            DVT ppx: SCD, follow CTH - if stable start lovenox tonight    Renal: NS 75 ml/hr, IVL when tolerates PO     ID: periop ancef     GI: advance diet as tolerated    Social/Family: awaiting.  Discharge planning: ICU    Code Status: [x] Full Code [] DNR [] DNI [] Goals of Care:   Disposition: [x] ICU [] Stroke Unit [] RCU []PCU []Floor [] Discharge Home     Patient at high risk for neurologic deterioration, critical care time, excluding procedures: 45 minutes A/P:   Acoustic neuroma s/p resection, post-operative day 1    Neuro:   CT head am, MRI brain w/wo in 48 hrs  watch for CSF leak   decadron 4 mg q 6 hrs per NSx  OOB/PT/OT    Respiratory:  Incentive spirometry    CV: -150 mmhg     Endocrine: finger sticks q 6 hrs, ISS, sugar goal 120-180     Heme/Onc: keep INR< 1.5, plt >100            DVT ppx: SCD, follow CTH - if stable start lovenox tonight    Renal: NS 75 ml/hr, IVL when tolerates PO     GI: advance diet as tolerated    ID: periop ancef completed    Social/Family: awaiting.  Discharge planning: ICU    Code Status: [x] Full Code [] DNR [] DNI [] Goals of Care:   Disposition: [x] ICU [] Stroke Unit [] RCU []PCU []Floor [] Discharge Home     Patient at high risk for neurologic deterioration, critical care time, excluding procedures: 45 minutes A/P:   Acoustic neuroma s/p resection, post-operative day 1    Neuro:   CT head am, MRI brain w/wo in 48 hrs  watch for CSF leak   decadron 4 mg q 6 hrs per NSx  OOB/PT/OT    Respiratory:  Incentive spirometry    CV: -150 mmhg     Endocrine: finger sticks q 6 hrs, ISS, sugar goal 120-180     Heme/Onc: keep INR< 1.5, plt >100            DVT ppx: SCD, follow CTH - if stable start lovenox tonight, high risk of VTE on admission given history of breast cancer    Renal: NS 75 ml/hr, IVL when tolerates PO  Repete lytes given metabolic acidosis     GI: advance diet as tolerated    ID: periop ancef completed    Social/Family: awaiting.  Discharge planning: ICU    Code Status: [x] Full Code [] DNR [] DNI [] Goals of Care:   Disposition: [x] ICU [] Stroke Unit [] RCU []PCU []Floor [] Discharge Home     Patient at high risk for neurologic deterioration, critical care time, excluding procedures: 45 minutes

## 2018-04-06 NOTE — PROGRESS NOTE ADULT - SUBJECTIVE AND OBJECTIVE BOX
Patient seen and examined at bedside.    T(C): 36.6 (04-05-18 @ 22:45), Max: 36.9 (04-05-18 @ 06:31)  HR: 81 (04-06-18 @ 02:00) (81 - 94)  BP: 160/98 (04-05-18 @ 06:31) (160/98 - 160/98)  RR: 14 (04-06-18 @ 02:00) (11 - 24)  SpO2: 99% (04-06-18 @ 02:00) (95% - 100%)  Wt(kg): --    EXAM:    awake, intubated, Following Commands  CN: PERRL, EOMI, no facial droop, able to close eyes fully and elevate brow  Motor: 3/5 throughout, effort limited  Reflexes: no clonus

## 2018-04-07 LAB
ANION GAP SERPL CALC-SCNC: 15 MMOL/L — SIGNIFICANT CHANGE UP (ref 5–17)
BUN SERPL-MCNC: 11 MG/DL — SIGNIFICANT CHANGE UP (ref 7–23)
CALCIUM SERPL-MCNC: 8.4 MG/DL — SIGNIFICANT CHANGE UP (ref 8.4–10.5)
CHLORIDE SERPL-SCNC: 98 MMOL/L — SIGNIFICANT CHANGE UP (ref 96–108)
CO2 SERPL-SCNC: 23 MMOL/L — SIGNIFICANT CHANGE UP (ref 22–31)
CREAT SERPL-MCNC: 0.61 MG/DL — SIGNIFICANT CHANGE UP (ref 0.5–1.3)
GLUCOSE BLDC GLUCOMTR-MCNC: 116 MG/DL — HIGH (ref 70–99)
GLUCOSE BLDC GLUCOMTR-MCNC: 133 MG/DL — HIGH (ref 70–99)
GLUCOSE BLDC GLUCOMTR-MCNC: 194 MG/DL — HIGH (ref 70–99)
GLUCOSE BLDC GLUCOMTR-MCNC: 201 MG/DL — HIGH (ref 70–99)
GLUCOSE SERPL-MCNC: 122 MG/DL — HIGH (ref 70–99)
HCT VFR BLD CALC: 37.1 % — SIGNIFICANT CHANGE UP (ref 34.5–45)
HGB BLD-MCNC: 12.8 G/DL — SIGNIFICANT CHANGE UP (ref 11.5–15.5)
MAGNESIUM SERPL-MCNC: 1.7 MG/DL — SIGNIFICANT CHANGE UP (ref 1.6–2.6)
MCHC RBC-ENTMCNC: 34.4 GM/DL — SIGNIFICANT CHANGE UP (ref 32–36)
MCHC RBC-ENTMCNC: 35.8 PG — HIGH (ref 27–34)
MCV RBC AUTO: 104 FL — HIGH (ref 80–100)
PHOSPHATE SERPL-MCNC: 1 MG/DL — CRITICAL LOW (ref 2.5–4.5)
PLATELET # BLD AUTO: 109 K/UL — LOW (ref 150–400)
POTASSIUM SERPL-MCNC: 3.3 MMOL/L — LOW (ref 3.5–5.3)
POTASSIUM SERPL-SCNC: 3.3 MMOL/L — LOW (ref 3.5–5.3)
RBC # BLD: 3.57 M/UL — LOW (ref 3.8–5.2)
RBC # FLD: 12.6 % — SIGNIFICANT CHANGE UP (ref 10.3–14.5)
SODIUM SERPL-SCNC: 136 MMOL/L — SIGNIFICANT CHANGE UP (ref 135–145)
WBC # BLD: 7.4 K/UL — SIGNIFICANT CHANGE UP (ref 3.8–10.5)
WBC # FLD AUTO: 7.4 K/UL — SIGNIFICANT CHANGE UP (ref 3.8–10.5)

## 2018-04-07 PROCEDURE — 70450 CT HEAD/BRAIN W/O DYE: CPT | Mod: 26

## 2018-04-07 PROCEDURE — 70553 MRI BRAIN STEM W/O & W/DYE: CPT | Mod: 26

## 2018-04-07 PROCEDURE — 99233 SBSQ HOSP IP/OBS HIGH 50: CPT

## 2018-04-07 RX ORDER — ENOXAPARIN SODIUM 100 MG/ML
40 INJECTION SUBCUTANEOUS
Qty: 0 | Refills: 0 | Status: DISCONTINUED | OUTPATIENT
Start: 2018-04-07 | End: 2018-04-10

## 2018-04-07 RX ORDER — HYDRALAZINE HCL 50 MG
5 TABLET ORAL ONCE
Qty: 0 | Refills: 0 | Status: COMPLETED | OUTPATIENT
Start: 2018-04-07 | End: 2018-04-07

## 2018-04-07 RX ORDER — OXYCODONE HYDROCHLORIDE 5 MG/1
5 TABLET ORAL EVERY 4 HOURS
Qty: 0 | Refills: 0 | Status: DISCONTINUED | OUTPATIENT
Start: 2018-04-07 | End: 2018-04-10

## 2018-04-07 RX ORDER — POTASSIUM CHLORIDE 20 MEQ
10 PACKET (EA) ORAL
Qty: 0 | Refills: 0 | Status: COMPLETED | OUTPATIENT
Start: 2018-04-07 | End: 2018-04-08

## 2018-04-07 RX ORDER — LABETALOL HCL 100 MG
50 TABLET ORAL ONCE
Qty: 0 | Refills: 0 | Status: COMPLETED | OUTPATIENT
Start: 2018-04-07 | End: 2018-04-07

## 2018-04-07 RX ORDER — MAGNESIUM SULFATE 500 MG/ML
2 VIAL (ML) INJECTION ONCE
Qty: 0 | Refills: 0 | Status: COMPLETED | OUTPATIENT
Start: 2018-04-07 | End: 2018-04-07

## 2018-04-07 RX ORDER — POLYETHYLENE GLYCOL 3350 17 G/17G
17 POWDER, FOR SOLUTION ORAL
Qty: 0 | Refills: 0 | Status: DISCONTINUED | OUTPATIENT
Start: 2018-04-07 | End: 2018-04-10

## 2018-04-07 RX ORDER — SODIUM CHLORIDE 9 MG/ML
1000 INJECTION INTRAMUSCULAR; INTRAVENOUS; SUBCUTANEOUS
Qty: 0 | Refills: 0 | Status: DISCONTINUED | OUTPATIENT
Start: 2018-04-07 | End: 2018-04-08

## 2018-04-07 RX ORDER — LABETALOL HCL 100 MG
100 TABLET ORAL THREE TIMES A DAY
Qty: 0 | Refills: 0 | Status: DISCONTINUED | OUTPATIENT
Start: 2018-04-07 | End: 2018-04-08

## 2018-04-07 RX ORDER — HUMAN INSULIN 100 [IU]/ML
6 INJECTION, SUSPENSION SUBCUTANEOUS
Qty: 0 | Refills: 0 | Status: DISCONTINUED | OUTPATIENT
Start: 2018-04-07 | End: 2018-04-07

## 2018-04-07 RX ORDER — INSULIN LISPRO 100/ML
VIAL (ML) SUBCUTANEOUS
Qty: 0 | Refills: 0 | Status: DISCONTINUED | OUTPATIENT
Start: 2018-04-07 | End: 2018-04-10

## 2018-04-07 RX ORDER — ACETAMINOPHEN 500 MG
1000 TABLET ORAL ONCE
Qty: 0 | Refills: 0 | Status: COMPLETED | OUTPATIENT
Start: 2018-04-07 | End: 2018-04-07

## 2018-04-07 RX ORDER — OXYCODONE HYDROCHLORIDE 5 MG/1
10 TABLET ORAL EVERY 4 HOURS
Qty: 0 | Refills: 0 | Status: DISCONTINUED | OUTPATIENT
Start: 2018-04-07 | End: 2018-04-10

## 2018-04-07 RX ORDER — ONDANSETRON 8 MG/1
4 TABLET, FILM COATED ORAL ONCE
Qty: 0 | Refills: 0 | Status: COMPLETED | OUTPATIENT
Start: 2018-04-07 | End: 2018-04-07

## 2018-04-07 RX ORDER — MAGNESIUM SULFATE 500 MG/ML
2 VIAL (ML) INJECTION ONCE
Qty: 0 | Refills: 0 | Status: COMPLETED | OUTPATIENT
Start: 2018-04-07 | End: 2018-04-08

## 2018-04-07 RX ORDER — HYDRALAZINE HCL 50 MG
10 TABLET ORAL EVERY 6 HOURS
Qty: 0 | Refills: 0 | Status: DISCONTINUED | OUTPATIENT
Start: 2018-04-07 | End: 2018-04-10

## 2018-04-07 RX ORDER — BENZOCAINE AND MENTHOL 5; 1 G/100ML; G/100ML
1 LIQUID ORAL THREE TIMES A DAY
Qty: 0 | Refills: 0 | Status: DISCONTINUED | OUTPATIENT
Start: 2018-04-07 | End: 2018-04-10

## 2018-04-07 RX ORDER — METOCLOPRAMIDE HCL 10 MG
10 TABLET ORAL ONCE
Qty: 0 | Refills: 0 | Status: COMPLETED | OUTPATIENT
Start: 2018-04-07 | End: 2018-04-08

## 2018-04-07 RX ORDER — LABETALOL HCL 100 MG
10 TABLET ORAL ONCE
Qty: 0 | Refills: 0 | Status: COMPLETED | OUTPATIENT
Start: 2018-04-07 | End: 2018-04-07

## 2018-04-07 RX ORDER — POTASSIUM PHOSPHATE, MONOBASIC POTASSIUM PHOSPHATE, DIBASIC 236; 224 MG/ML; MG/ML
30 INJECTION, SOLUTION INTRAVENOUS ONCE
Qty: 0 | Refills: 0 | Status: COMPLETED | OUTPATIENT
Start: 2018-04-07 | End: 2018-04-08

## 2018-04-07 RX ORDER — HUMAN INSULIN 100 [IU]/ML
6 INJECTION, SUSPENSION SUBCUTANEOUS EVERY 6 HOURS
Qty: 0 | Refills: 0 | Status: DISCONTINUED | OUTPATIENT
Start: 2018-04-07 | End: 2018-04-09

## 2018-04-07 RX ADMIN — ENOXAPARIN SODIUM 40 MILLIGRAM(S): 100 INJECTION SUBCUTANEOUS at 18:38

## 2018-04-07 RX ADMIN — Medication 100 MILLIGRAM(S): at 18:38

## 2018-04-07 RX ADMIN — Medication 100 MILLIGRAM(S): at 05:38

## 2018-04-07 RX ADMIN — Medication 6 MILLIGRAM(S): at 18:38

## 2018-04-07 RX ADMIN — Medication 2: at 12:54

## 2018-04-07 RX ADMIN — HUMAN INSULIN 4 UNIT(S): 100 INJECTION, SUSPENSION SUBCUTANEOUS at 14:19

## 2018-04-07 RX ADMIN — DEXTROSE MONOHYDRATE, SODIUM CHLORIDE, AND POTASSIUM CHLORIDE 110 MILLILITER(S): 50; .745; 4.5 INJECTION, SOLUTION INTRAVENOUS at 03:39

## 2018-04-07 RX ADMIN — Medication 6 MILLIGRAM(S): at 23:43

## 2018-04-07 RX ADMIN — PANTOPRAZOLE SODIUM 40 MILLIGRAM(S): 20 TABLET, DELAYED RELEASE ORAL at 12:41

## 2018-04-07 RX ADMIN — Medication 6 MILLIGRAM(S): at 05:37

## 2018-04-07 RX ADMIN — Medication 100 MILLIGRAM(S): at 21:20

## 2018-04-07 RX ADMIN — Medication 20 MILLIGRAM(S): at 12:41

## 2018-04-07 RX ADMIN — Medication 1 APPLICATION(S): at 18:49

## 2018-04-07 RX ADMIN — ONDANSETRON 4 MILLIGRAM(S): 8 TABLET, FILM COATED ORAL at 23:06

## 2018-04-07 RX ADMIN — SODIUM CHLORIDE 110 MILLILITER(S): 9 INJECTION INTRAMUSCULAR; INTRAVENOUS; SUBCUTANEOUS at 19:50

## 2018-04-07 RX ADMIN — Medication 50 MILLIGRAM(S): at 20:57

## 2018-04-07 RX ADMIN — Medication 1 APPLICATION(S): at 23:44

## 2018-04-07 RX ADMIN — Medication 6 MILLIGRAM(S): at 12:41

## 2018-04-07 RX ADMIN — BENZOCAINE AND MENTHOL 1 LOZENGE: 5; 1 LIQUID ORAL at 08:32

## 2018-04-07 RX ADMIN — Medication 10 MILLIGRAM(S): at 14:19

## 2018-04-07 RX ADMIN — OXYCODONE AND ACETAMINOPHEN 1 TABLET(S): 5; 325 TABLET ORAL at 08:32

## 2018-04-07 RX ADMIN — HUMAN INSULIN 4 UNIT(S): 100 INJECTION, SUSPENSION SUBCUTANEOUS at 05:38

## 2018-04-07 RX ADMIN — Medication 400 MILLIGRAM(S): at 22:00

## 2018-04-07 RX ADMIN — OXYCODONE AND ACETAMINOPHEN 2 TABLET(S): 5; 325 TABLET ORAL at 20:40

## 2018-04-07 RX ADMIN — Medication 100 MILLIEQUIVALENT(S): at 23:44

## 2018-04-07 RX ADMIN — OXYCODONE AND ACETAMINOPHEN 1 TABLET(S): 5; 325 TABLET ORAL at 13:15

## 2018-04-07 RX ADMIN — OXYCODONE AND ACETAMINOPHEN 2 TABLET(S): 5; 325 TABLET ORAL at 21:40

## 2018-04-07 RX ADMIN — HUMAN INSULIN 6 UNIT(S): 100 INJECTION, SUSPENSION SUBCUTANEOUS at 23:07

## 2018-04-07 RX ADMIN — Medication 62.5 MILLIMOLE(S): at 03:35

## 2018-04-07 RX ADMIN — Medication 1 APPLICATION(S): at 12:41

## 2018-04-07 RX ADMIN — OXYCODONE AND ACETAMINOPHEN 1 TABLET(S): 5; 325 TABLET ORAL at 12:42

## 2018-04-07 RX ADMIN — OXYCODONE AND ACETAMINOPHEN 1 TABLET(S): 5; 325 TABLET ORAL at 09:00

## 2018-04-07 RX ADMIN — Medication 1000 MILLIGRAM(S): at 23:00

## 2018-04-07 RX ADMIN — SENNA PLUS 2 TABLET(S): 8.6 TABLET ORAL at 21:20

## 2018-04-07 RX ADMIN — Medication 50 GRAM(S): at 01:48

## 2018-04-07 RX ADMIN — Medication 4: at 18:49

## 2018-04-07 RX ADMIN — Medication 5 MILLIGRAM(S): at 19:50

## 2018-04-07 RX ADMIN — Medication 100 MILLIGRAM(S): at 14:19

## 2018-04-07 NOTE — PROGRESS NOTE ADULT - SUBJECTIVE AND OBJECTIVE BOX
SUMMARY:  63 year old female with PMH of breast cancer and HTN with complaints of hearing loss/ dizziness/ unsteady gait found to have  Acoustic neuroma underwent left retrosigmoid craniotomy for resection of acoustic neuroma on 4/5/18.  SURGERY: Acoustic neuroma resection: Right craniotomy for resection of vestiublar schwannoma 4/5.    4/6 Admitted to NSCU. Extubated. Weaned off nicardipine gtt overnight.    Overnight events - none reported.  VITALS/DATA/ORDERS: Reviewed    PHYSICAL EXAM:  General: No Acute Distress   Neurological: A+Ox3, follows, right facial (complete eye closure, HB 3), decreased sensation of right face, no drift, full strength - walks to the bathroom  Pulmonary: Clear to Auscultation, No Rales, No Rhonchi, No Wheezes   Cardiovascular: S1, S2, Regular Rate and Rhythm   Gastrointestinal: Soft, Nontender, Nondistended   Extremities: No calf tenderness

## 2018-04-07 NOTE — PROGRESS NOTE ADULT - ASSESSMENT
A/P:   Acoustic neuroma s/p resection, post-operative day 2    Neuro:   CT head am - stable, MRI brain w/wo in 48 hrs  no CSF leak   decadron 4 mg q 6 hrs per NSx  pain control  OOB/PT/OT    Respiratory:  Incentive spirometry    CV: -150 mmhg     Endocrine: finger sticks q 6 hrs, ISS, sugar goal 120-180     Heme/Onc: keep INR< 1.5, plt >100            DVT ppx: SCD, follow CTH - if stable start lovenox tonight, high risk of VTE on admission given history of breast cancer    Renal: IVL.     GI: tolerates PO    ID: periop ancef completed    Social/Family: awaiting.  Discharge planning: ICU    Code Status: [x] Full Code [] DNR [] DNI [] Goals of Care:   Disposition: transfer to floor    Patient at high risk for neurologic deterioration, critical care time, excluding procedures: 45 minutes A/P:   Acoustic neuroma s/p resection, post-operative day 2    Neuro:   CT head am - stable, MRI brain w/wo in 48 hrs  no CSF leak   decadron 4 mg q 6 hrs per NSx  pain control  OOB/PT/OT    Respiratory:  Incentive spirometry    CV: -150 mmhg     Endocrine: finger sticks q 6 hrs, ISS, sugar goal 120-180     Heme/Onc: keep INR< 1.5, plt >100            DVT ppx: SCD, follow CTH - if stable start lovenox tonight, high risk of VTE on admission given history of breast cancer    Renal: IVL.     GI: tolerates PO    ID: periop ancef completed    Social/Family: awaiting.  Discharge planning: ICU    Code Status: [x] Full Code [] DNR [] DNI [] Goals of Care:   Disposition: transfer to floor

## 2018-04-07 NOTE — PHYSICAL THERAPY INITIAL EVALUATION ADULT - PERTINENT HX OF CURRENT PROBLEM, REHAB EVAL
63yoF w/ PMH of HTN with complaints of hearing loss/ dizziness/ unsteady gait found to have Acoustic neuroma planned for L retrosigmoid craniotomy trans petrosal approach & resection of acoustic neuroma, R retrosigmoid approach resection of vestibular schwannoma, decompression of internal auditory canal

## 2018-04-07 NOTE — PHYSICAL THERAPY INITIAL EVALUATION ADULT - GENERAL OBSERVATIONS, REHAB EVAL
rec'ed seated in chair besides bed, noted R facial droop, +tele, +IV (disconnected by RN/Lisa for PT session), +B LE venodyne boots, chris PT eval w/o adverse reaction.

## 2018-04-07 NOTE — PROGRESS NOTE ADULT - SUBJECTIVE AND OBJECTIVE BOX
- Patient seen and examined.  - In summary, patient is a 63 year old woman who presented for acoustic neuroma removal (2018 06:29)  - Today, patient is without complaints.         *****MEDICATIONS:    MEDICATIONS  (STANDING):  ceFAZolin   IVPB 2000 milliGRAM(s) IV Intermittent once  dexamethasone  Injectable 6 milliGRAM(s) IV Push every 6 hours  docusate sodium 100 milliGRAM(s) Oral three times a day  FLUoxetine 20 milliGRAM(s) Oral daily  insulin lispro (HumaLOG) corrective regimen sliding scale   SubCutaneous every 6 hours  insulin NPH human recombinant 4 Unit(s) SubCutaneous every 8 hours  labetalol 100 milliGRAM(s) Oral two times a day  niCARdipine Infusion 2 mG/Hr (10 mL/Hr) IV Continuous <Continuous>  pantoprazole  Injectable 40 milliGRAM(s) IV Push daily  petrolatum Ophthalmic Ointment 1 Application(s) Right EYE every 6 hours  senna 2 Tablet(s) Oral at bedtime  sodium chloride 0.9%. 1000 milliLiter(s) (110 mL/Hr) IV Continuous <Continuous>    MEDICATIONS  (PRN):  acetaminophen   Tablet 650 milliGRAM(s) Oral every 6 hours PRN For Temp greater than 38 C (100.4 F)  acetaminophen   Tablet. 650 milliGRAM(s) Oral every 6 hours PRN Mild Pain (1 - 3)  benzocaine 15 mG/menthol 3.6 mG Lozenge 1 Lozenge Oral three times a day PRN Sore Throat  oxyCODONE    5 mG/acetaminophen 325 mG 1 Tablet(s) Oral every 4 hours PRN Moderate Pain  oxyCODONE    5 mG/acetaminophen 325 mG 2 Tablet(s) Oral every 6 hours PRN Severe Pain           ***** REVIEW OF SYSTEM:  GEN: no fever, no chills, no pain  RESP: no SOB, no cough, no sputum  CVS: no chest pain, no palpitations, no edema  GI: no abdominal pain, no nausea, no vomiting, no constipation, no diarrhea  : no dysuria, no frequency  NEURO: no headache, no dizziness  PSYCH: no depression, not anxious  Derm : no itching, no rash         ***** VITAL SIGNS:  T(F): 98.2 (18 @ 05:00), Max: 98.6 (18 @ 19:00)  HR: 76 (18 @ 10:00) (71 - 132)  BP: 157/94 (18 @ 10:00) (154/90 - 186/110)  RR: 15 (18 @ 10:00) (9 - 25)  SpO2: 94% (18 @ 10:00) (90% - 95%)  Wt(kg): --  ,   I&O's Summary    2018 07:01  -  2018 07:00  --------------------------------------------------------  IN: 2810 mL / OUT: 1500 mL / NET: 1310 mL             *****PHYSICAL EXAM:  GEN: A&O X 3 , NAD , comfortable  HEENT: NCAT, EOMI, MMM, no icterus  NECK: Supple, No JVD  CVS: S1S2 , regular , No M/R/G appreciated  PULM: CTA B/L,  no W/R/R appreciated  ABD.: soft. non tender, non distended,  bowel sounds present  Extrem: intact pulses , no edema noted  Derm: No rash or ecchymosis noted  PSYCH: normal mood, no depression, not anxious         *****LAB AND IMAGIN.1   9.9   )-----------( 137      ( 2018 21:18 )             40.9               04-06    139  |  100  |  10  ----------------------------<  202<H>  5.5<H>   |  24  |  0.72    Ca    8.7      2018 21:09  Phos  1.7     04-06  Mg     1.7     04-06                       ABG - ( 2018 11:42 )  pH: 7.44  /  pCO2: 35    /  pO2: 65    / HCO3: 23    / Base Excess: -.1   /  SaO2: 93                  [All pertinent recent Imaging/Reports reviewed]         *****A S S E S S M E N T   A N D   P L A N :  63F s/p R crani for resection of likely vestibular schwannoma 18  post op care per NSx  VSS  cont current meds  f/u imaging  recent echo noted  OOB, PT as able      __________________________  ALorena Fish D.O.

## 2018-04-07 NOTE — PHYSICAL THERAPY INITIAL EVALUATION ADULT - ADDITIONAL COMMENTS
Pt lives in a mobile home w/ mother (who is independent), 3 steps to enter. PTA pt (I) w/ all functional mobility & ADL w/o AD.

## 2018-04-08 LAB
ANION GAP SERPL CALC-SCNC: 13 MMOL/L — SIGNIFICANT CHANGE UP (ref 5–17)
BUN SERPL-MCNC: 16 MG/DL — SIGNIFICANT CHANGE UP (ref 7–23)
CALCIUM SERPL-MCNC: 9.2 MG/DL — SIGNIFICANT CHANGE UP (ref 8.4–10.5)
CHLORIDE SERPL-SCNC: 96 MMOL/L — SIGNIFICANT CHANGE UP (ref 96–108)
CO2 SERPL-SCNC: 25 MMOL/L — SIGNIFICANT CHANGE UP (ref 22–31)
CREAT SERPL-MCNC: 0.74 MG/DL — SIGNIFICANT CHANGE UP (ref 0.5–1.3)
GLUCOSE BLDC GLUCOMTR-MCNC: 117 MG/DL — HIGH (ref 70–99)
GLUCOSE BLDC GLUCOMTR-MCNC: 128 MG/DL — HIGH (ref 70–99)
GLUCOSE BLDC GLUCOMTR-MCNC: 131 MG/DL — HIGH (ref 70–99)
GLUCOSE BLDC GLUCOMTR-MCNC: 170 MG/DL — HIGH (ref 70–99)
GLUCOSE BLDC GLUCOMTR-MCNC: 253 MG/DL — HIGH (ref 70–99)
GLUCOSE SERPL-MCNC: 259 MG/DL — HIGH (ref 70–99)
HCT VFR BLD CALC: 40.3 % — SIGNIFICANT CHANGE UP (ref 34.5–45)
HGB BLD-MCNC: 13.7 G/DL — SIGNIFICANT CHANGE UP (ref 11.5–15.5)
MAGNESIUM SERPL-MCNC: 2 MG/DL — SIGNIFICANT CHANGE UP (ref 1.6–2.6)
MCHC RBC-ENTMCNC: 34 GM/DL — SIGNIFICANT CHANGE UP (ref 32–36)
MCHC RBC-ENTMCNC: 36 PG — HIGH (ref 27–34)
MCV RBC AUTO: 106 FL — HIGH (ref 80–100)
PHOSPHATE SERPL-MCNC: 2.3 MG/DL — LOW (ref 2.5–4.5)
PLATELET # BLD AUTO: 116 K/UL — LOW (ref 150–400)
POTASSIUM SERPL-MCNC: 4.2 MMOL/L — SIGNIFICANT CHANGE UP (ref 3.5–5.3)
POTASSIUM SERPL-SCNC: 4.2 MMOL/L — SIGNIFICANT CHANGE UP (ref 3.5–5.3)
RBC # BLD: 3.81 M/UL — SIGNIFICANT CHANGE UP (ref 3.8–5.2)
RBC # FLD: 12.8 % — SIGNIFICANT CHANGE UP (ref 10.3–14.5)
SODIUM SERPL-SCNC: 134 MMOL/L — LOW (ref 135–145)
WBC # BLD: 5.1 K/UL — SIGNIFICANT CHANGE UP (ref 3.8–10.5)
WBC # FLD AUTO: 5.1 K/UL — SIGNIFICANT CHANGE UP (ref 3.8–10.5)

## 2018-04-08 PROCEDURE — 99233 SBSQ HOSP IP/OBS HIGH 50: CPT

## 2018-04-08 RX ORDER — LABETALOL HCL 100 MG
200 TABLET ORAL THREE TIMES A DAY
Qty: 0 | Refills: 0 | Status: DISCONTINUED | OUTPATIENT
Start: 2018-04-08 | End: 2018-04-10

## 2018-04-08 RX ORDER — SODIUM CHLORIDE 9 MG/ML
2 INJECTION INTRAMUSCULAR; INTRAVENOUS; SUBCUTANEOUS EVERY 6 HOURS
Qty: 0 | Refills: 0 | Status: DISCONTINUED | OUTPATIENT
Start: 2018-04-08 | End: 2018-04-08

## 2018-04-08 RX ORDER — LABETALOL HCL 100 MG
100 TABLET ORAL ONCE
Qty: 0 | Refills: 0 | Status: COMPLETED | OUTPATIENT
Start: 2018-04-08 | End: 2018-04-08

## 2018-04-08 RX ADMIN — Medication 100 MILLIEQUIVALENT(S): at 02:33

## 2018-04-08 RX ADMIN — HUMAN INSULIN 6 UNIT(S): 100 INJECTION, SUSPENSION SUBCUTANEOUS at 09:23

## 2018-04-08 RX ADMIN — OXYCODONE HYDROCHLORIDE 5 MILLIGRAM(S): 5 TABLET ORAL at 09:27

## 2018-04-08 RX ADMIN — HUMAN INSULIN 6 UNIT(S): 100 INJECTION, SUSPENSION SUBCUTANEOUS at 18:11

## 2018-04-08 RX ADMIN — Medication 200 MILLIGRAM(S): at 23:03

## 2018-04-08 RX ADMIN — POLYETHYLENE GLYCOL 3350 17 GRAM(S): 17 POWDER, FOR SOLUTION ORAL at 21:16

## 2018-04-08 RX ADMIN — PANTOPRAZOLE SODIUM 40 MILLIGRAM(S): 20 TABLET, DELAYED RELEASE ORAL at 12:45

## 2018-04-08 RX ADMIN — Medication 20 MILLIGRAM(S): at 12:46

## 2018-04-08 RX ADMIN — Medication 6: at 21:16

## 2018-04-08 RX ADMIN — OXYCODONE HYDROCHLORIDE 10 MILLIGRAM(S): 5 TABLET ORAL at 14:44

## 2018-04-08 RX ADMIN — Medication 6 MILLIGRAM(S): at 23:03

## 2018-04-08 RX ADMIN — Medication 10 MILLIGRAM(S): at 00:02

## 2018-04-08 RX ADMIN — OXYCODONE HYDROCHLORIDE 10 MILLIGRAM(S): 5 TABLET ORAL at 12:46

## 2018-04-08 RX ADMIN — POTASSIUM PHOSPHATE, MONOBASIC POTASSIUM PHOSPHATE, DIBASIC 83.33 MILLIMOLE(S): 236; 224 INJECTION, SOLUTION INTRAVENOUS at 00:22

## 2018-04-08 RX ADMIN — SENNA PLUS 2 TABLET(S): 8.6 TABLET ORAL at 21:11

## 2018-04-08 RX ADMIN — Medication 100 MILLIGRAM(S): at 06:09

## 2018-04-08 RX ADMIN — Medication 6 MILLIGRAM(S): at 18:10

## 2018-04-08 RX ADMIN — OXYCODONE HYDROCHLORIDE 5 MILLIGRAM(S): 5 TABLET ORAL at 08:37

## 2018-04-08 RX ADMIN — Medication 10 MILLIGRAM(S): at 09:23

## 2018-04-08 RX ADMIN — HUMAN INSULIN 6 UNIT(S): 100 INJECTION, SUSPENSION SUBCUTANEOUS at 23:04

## 2018-04-08 RX ADMIN — Medication 100 MILLIGRAM(S): at 13:01

## 2018-04-08 RX ADMIN — Medication 6 MILLIGRAM(S): at 12:45

## 2018-04-08 RX ADMIN — Medication 100 MILLIGRAM(S): at 21:10

## 2018-04-08 RX ADMIN — Medication 2.5 MILLIGRAM(S): at 14:22

## 2018-04-08 RX ADMIN — Medication 100 MILLIEQUIVALENT(S): at 00:30

## 2018-04-08 RX ADMIN — Medication 10 MILLIGRAM(S): at 19:30

## 2018-04-08 RX ADMIN — Medication 50 GRAM(S): at 00:22

## 2018-04-08 RX ADMIN — ENOXAPARIN SODIUM 40 MILLIGRAM(S): 100 INJECTION SUBCUTANEOUS at 18:10

## 2018-04-08 RX ADMIN — Medication 6 MILLIGRAM(S): at 06:08

## 2018-04-08 RX ADMIN — HUMAN INSULIN 6 UNIT(S): 100 INJECTION, SUSPENSION SUBCUTANEOUS at 13:10

## 2018-04-08 RX ADMIN — Medication 200 MILLIGRAM(S): at 13:11

## 2018-04-08 RX ADMIN — Medication 1 APPLICATION(S): at 06:09

## 2018-04-08 RX ADMIN — Medication 100 MILLIGRAM(S): at 13:11

## 2018-04-08 NOTE — PROGRESS NOTE ADULT - ASSESSMENT
post-operative day 2 from resection of a right vestibular schwannoma  - MRI brain  - hydration for sinus manipulation and possible venous sinus thrombosis  - steroids for cerebral edema

## 2018-04-08 NOTE — PROGRESS NOTE ADULT - SUBJECTIVE AND OBJECTIVE BOX
63 year-old woman status post right vestibular schwannoma resection on 4/5/18 with course notable for [facility] Brackman 3 right facial droop and possible sinus thrombosis    ROS: No headache, no weakness, dry/itchy right eye    Exam:  NAD  Lungs clear  Heart regular  Abdomen soft, +BS  No c/c/e  Awake, alert, fully oriented, follows, right facial, slightly decreased facial sensation over V3, full strength

## 2018-04-08 NOTE — PROGRESS NOTE ADULT - ASSESSMENT
A/P:   Acoustic neuroma s/p resection, post-operative day 3    Neuro:   f/u official MRI read  no CSF leak   decadron 4 mg q 6 hrs per NSx  pain control  OOB/PT/OT    Respiratory:  Incentive spirometry    CV: -150 mmhg     Endocrine: finger sticks q 6 hrs, ISS, sugar goal 120-180     Heme/Onc: keep INR< 1.5, plt >100            DVT ppx: SCD, lovenox ppx    Renal: IVL.     GI: tolerates PO    ID: periop ancef completed    Social/Family: awaiting.    Code Status: [x] Full Code [] DNR [] DNI [] Goals of Care:   Disposition: transfer to floor pending MRI review

## 2018-04-08 NOTE — PROGRESS NOTE ADULT - SUBJECTIVE AND OBJECTIVE BOX
SUMMARY:  63 year old female with PMH of breast cancer and HTN with complaints of hearing loss/ dizziness/ unsteady gait found to have  Acoustic neuroma underwent left retrosigmoid craniotomy for resection of acoustic neuroma on 4/5/18.  SURGERY: Acoustic neuroma resection: Right craniotomy for resection of vestiublar schwannoma 4/5.    4/6 Admitted to NSCU. Extubated. Weaned off nicardipine gtt overnight.  4/7 CTH w/ R cerebellar lucency, official MRI read pending    Overnight events - none reported.  VITALS/DATA/ORDERS: Reviewed    PHYSICAL EXAM:  General: No Acute Distress   Neurological: A+Ox3, follows, right facial (complete eye closure, HB 3), decreased sensation of right face, no drift, full strength - walks to the bathroom  Pulmonary: Clear to Auscultation, No Rales, No Rhonchi, No Wheezes   Cardiovascular: S1, S2, Regular Rate and Rhythm   Gastrointestinal: Soft, Nontender, Nondistended   Extremities: No calf tenderness

## 2018-04-08 NOTE — PROGRESS NOTE ADULT - SUBJECTIVE AND OBJECTIVE BOX
- Patient seen and examined.  - In summary, patient is a 63 year old woman who presented for acoustic neuroma removal (2018 06:29)  - Today, patient is without complaints.         *****MEDICATIONS:    MEDICATIONS  (STANDING):  dexamethasone  Injectable 6 milliGRAM(s) IV Push every 6 hours  docusate sodium 100 milliGRAM(s) Oral three times a day  enoxaparin Injectable 40 milliGRAM(s) SubCutaneous <User Schedule>  FLUoxetine 20 milliGRAM(s) Oral daily  insulin lispro (HumaLOG) corrective regimen sliding scale   SubCutaneous Before meals and at bedtime  insulin NPH human recombinant 6 Unit(s) SubCutaneous every 6 hours  labetalol 100 milliGRAM(s) Oral three times a day  pantoprazole  Injectable 40 milliGRAM(s) IV Push daily  petrolatum Ophthalmic Ointment 1 Application(s) Right EYE every 6 hours  polyethylene glycol 3350 17 Gram(s) Oral two times a day  senna 2 Tablet(s) Oral at bedtime  sodium chloride 0.9%. 1000 milliLiter(s) (110 mL/Hr) IV Continuous <Continuous>    MEDICATIONS  (PRN):  acetaminophen   Tablet 650 milliGRAM(s) Oral every 6 hours PRN For Temp greater than 38 C (100.4 F)  acetaminophen   Tablet. 650 milliGRAM(s) Oral every 6 hours PRN Mild Pain (1 - 3)  benzocaine 15 mG/menthol 3.6 mG Lozenge 1 Lozenge Oral three times a day PRN Sore Throat  hydrALAZINE Injectable 10 milliGRAM(s) IV Push every 6 hours PRN SBP>170  oxyCODONE    IR 5 milliGRAM(s) Oral every 4 hours PRN Moderate Pain (4 - 6)  oxyCODONE    IR 10 milliGRAM(s) Oral every 4 hours PRN Severe Pain (7 - 10)             ***** REVIEW OF SYSTEM:  GEN: no fever, no chills, no pain  RESP: no SOB, no cough, no sputum  CVS: no chest pain, no palpitations, no edema  GI: no abdominal pain, no nausea, no vomiting, no constipation, no diarrhea  : no dysuria, no frequency  NEURO: no headache, no dizziness  PSYCH: no depression, not anxious  Derm : no itching, no rash         ***** VITAL SIGNS:    T(F): 97.7 (18 @ 08:00), Max: 98.4 (18 @ 12:00)  HR: 76 (18 @ 10:00) (65 - 129)  BP: 135/88 (18 @ 10:00) (127/63 - 191/114)  RR: 54 (18 @ 10:00) (12 - 54)  SpO2: 95% (18 @ 10:00) (92% - 100%)  Wt(kg): --  ,   I&O's Summary    2018 07:  -  2018 07:00  --------------------------------------------------------  IN: 3608 mL / OUT: 1500 mL / NET: 2108 mL    2018 07:  -  2018 10:05  --------------------------------------------------------  IN: 340 mL / OUT: 0 mL / NET: 340 mL                   *****PHYSICAL EXAM:  GEN: A&O X 3 , NAD , comfortable  HEENT: NCAT, EOMI, MMM, no icterus  NECK: Supple, No JVD  CVS: S1S2 , regular , No M/R/G appreciated  PULM: CTA B/L,  no W/R/R appreciated  ABD.: soft. non tender, non distended,  bowel sounds present  Extrem: intact pulses , no edema noted  Derm: No rash or ecchymosis noted  PSYCH: normal mood, no depression, not anxious         *****LAB AND IMAGIN.8   7.4   )-----------( 109      ( 2018 22:46 )             37.1                   136  |  98  |  11  ----------------------------<  122<H>  3.3<L>   |  23  |  0.61    Ca    8.4      2018 22:46  Phos  1.0     04-07  Mg     1.7     04-        [All pertinent recent Imaging/Reports reviewed]         *****A S S E S S M E N T   A N D   P L A N :  63F s/p R crani for resection of likely vestibular schwannoma 18  post op care per NSx  VSS  cont current meds  f/u MRI  recent echo noted  OOB, PT as able      __________________________  A. ROMANA Fish.

## 2018-04-09 LAB
ANION GAP SERPL CALC-SCNC: 14 MMOL/L — SIGNIFICANT CHANGE UP (ref 5–17)
ANION GAP SERPL CALC-SCNC: 18 MMOL/L — HIGH (ref 5–17)
BUN SERPL-MCNC: 17 MG/DL — SIGNIFICANT CHANGE UP (ref 7–23)
BUN SERPL-MCNC: 17 MG/DL — SIGNIFICANT CHANGE UP (ref 7–23)
CALCIUM SERPL-MCNC: 9 MG/DL — SIGNIFICANT CHANGE UP (ref 8.4–10.5)
CALCIUM SERPL-MCNC: 9.4 MG/DL — SIGNIFICANT CHANGE UP (ref 8.4–10.5)
CHLORIDE SERPL-SCNC: 92 MMOL/L — LOW (ref 96–108)
CHLORIDE SERPL-SCNC: 97 MMOL/L — SIGNIFICANT CHANGE UP (ref 96–108)
CO2 SERPL-SCNC: 22 MMOL/L — SIGNIFICANT CHANGE UP (ref 22–31)
CO2 SERPL-SCNC: 24 MMOL/L — SIGNIFICANT CHANGE UP (ref 22–31)
CREAT SERPL-MCNC: 0.62 MG/DL — SIGNIFICANT CHANGE UP (ref 0.5–1.3)
CREAT SERPL-MCNC: 0.68 MG/DL — SIGNIFICANT CHANGE UP (ref 0.5–1.3)
GLUCOSE BLDC GLUCOMTR-MCNC: 102 MG/DL — HIGH (ref 70–99)
GLUCOSE BLDC GLUCOMTR-MCNC: 103 MG/DL — HIGH (ref 70–99)
GLUCOSE BLDC GLUCOMTR-MCNC: 128 MG/DL — HIGH (ref 70–99)
GLUCOSE BLDC GLUCOMTR-MCNC: 146 MG/DL — HIGH (ref 70–99)
GLUCOSE BLDC GLUCOMTR-MCNC: 147 MG/DL — HIGH (ref 70–99)
GLUCOSE SERPL-MCNC: 110 MG/DL — HIGH (ref 70–99)
GLUCOSE SERPL-MCNC: 178 MG/DL — HIGH (ref 70–99)
POTASSIUM SERPL-MCNC: 3.1 MMOL/L — LOW (ref 3.5–5.3)
POTASSIUM SERPL-MCNC: 3.6 MMOL/L — SIGNIFICANT CHANGE UP (ref 3.5–5.3)
POTASSIUM SERPL-SCNC: 3.1 MMOL/L — LOW (ref 3.5–5.3)
POTASSIUM SERPL-SCNC: 3.6 MMOL/L — SIGNIFICANT CHANGE UP (ref 3.5–5.3)
SODIUM SERPL-SCNC: 132 MMOL/L — LOW (ref 135–145)
SODIUM SERPL-SCNC: 135 MMOL/L — SIGNIFICANT CHANGE UP (ref 135–145)
SURGICAL PATHOLOGY STUDY: SIGNIFICANT CHANGE UP

## 2018-04-09 PROCEDURE — 70450 CT HEAD/BRAIN W/O DYE: CPT | Mod: 26

## 2018-04-09 RX ORDER — DEXTROSE MONOHYDRATE, SODIUM CHLORIDE, AND POTASSIUM CHLORIDE 50; .745; 4.5 G/1000ML; G/1000ML; G/1000ML
1000 INJECTION, SOLUTION INTRAVENOUS
Qty: 0 | Refills: 0 | Status: DISCONTINUED | OUTPATIENT
Start: 2018-04-09 | End: 2018-04-10

## 2018-04-09 RX ORDER — INSULIN GLARGINE 100 [IU]/ML
10 INJECTION, SOLUTION SUBCUTANEOUS AT BEDTIME
Qty: 0 | Refills: 0 | Status: DISCONTINUED | OUTPATIENT
Start: 2018-04-09 | End: 2018-04-10

## 2018-04-09 RX ORDER — ACETAMINOPHEN 500 MG
1000 TABLET ORAL ONCE
Qty: 0 | Refills: 0 | Status: COMPLETED | OUTPATIENT
Start: 2018-04-09 | End: 2018-04-09

## 2018-04-09 RX ORDER — METOCLOPRAMIDE HCL 10 MG
10 TABLET ORAL ONCE
Qty: 0 | Refills: 0 | Status: COMPLETED | OUTPATIENT
Start: 2018-04-09 | End: 2018-04-09

## 2018-04-09 RX ORDER — DEXAMETHASONE 0.5 MG/5ML
6 ELIXIR ORAL ONCE
Qty: 0 | Refills: 0 | Status: COMPLETED | OUTPATIENT
Start: 2018-04-09 | End: 2018-04-09

## 2018-04-09 RX ORDER — ONDANSETRON 8 MG/1
4 TABLET, FILM COATED ORAL EVERY 6 HOURS
Qty: 0 | Refills: 0 | Status: DISCONTINUED | OUTPATIENT
Start: 2018-04-09 | End: 2018-04-10

## 2018-04-09 RX ORDER — DEXAMETHASONE 0.5 MG/5ML
4 ELIXIR ORAL EVERY 6 HOURS
Qty: 0 | Refills: 0 | Status: DISCONTINUED | OUTPATIENT
Start: 2018-04-09 | End: 2018-04-10

## 2018-04-09 RX ORDER — LISINOPRIL 2.5 MG/1
5 TABLET ORAL DAILY
Qty: 0 | Refills: 0 | Status: DISCONTINUED | OUTPATIENT
Start: 2018-04-09 | End: 2018-04-10

## 2018-04-09 RX ORDER — METOCLOPRAMIDE HCL 10 MG
10 TABLET ORAL EVERY 8 HOURS
Qty: 0 | Refills: 0 | Status: DISCONTINUED | OUTPATIENT
Start: 2018-04-09 | End: 2018-04-10

## 2018-04-09 RX ORDER — POTASSIUM CHLORIDE 20 MEQ
20 PACKET (EA) ORAL
Qty: 0 | Refills: 0 | Status: COMPLETED | OUTPATIENT
Start: 2018-04-09 | End: 2018-04-10

## 2018-04-09 RX ADMIN — Medication 4 MILLIGRAM(S): at 11:26

## 2018-04-09 RX ADMIN — Medication 1000 MILLIGRAM(S): at 22:15

## 2018-04-09 RX ADMIN — OXYCODONE HYDROCHLORIDE 5 MILLIGRAM(S): 5 TABLET ORAL at 11:26

## 2018-04-09 RX ADMIN — ONDANSETRON 4 MILLIGRAM(S): 8 TABLET, FILM COATED ORAL at 20:28

## 2018-04-09 RX ADMIN — HUMAN INSULIN 6 UNIT(S): 100 INJECTION, SUSPENSION SUBCUTANEOUS at 06:00

## 2018-04-09 RX ADMIN — Medication 200 MILLIGRAM(S): at 05:38

## 2018-04-09 RX ADMIN — Medication 400 MILLIGRAM(S): at 16:01

## 2018-04-09 RX ADMIN — OXYCODONE HYDROCHLORIDE 5 MILLIGRAM(S): 5 TABLET ORAL at 12:00

## 2018-04-09 RX ADMIN — Medication 400 MILLIGRAM(S): at 21:25

## 2018-04-09 RX ADMIN — INSULIN GLARGINE 10 UNIT(S): 100 INJECTION, SOLUTION SUBCUTANEOUS at 22:22

## 2018-04-09 RX ADMIN — ENOXAPARIN SODIUM 40 MILLIGRAM(S): 100 INJECTION SUBCUTANEOUS at 17:24

## 2018-04-09 RX ADMIN — Medication 100 MILLIGRAM(S): at 05:39

## 2018-04-09 RX ADMIN — Medication 10 MILLIGRAM(S): at 21:25

## 2018-04-09 RX ADMIN — PANTOPRAZOLE SODIUM 40 MILLIGRAM(S): 20 TABLET, DELAYED RELEASE ORAL at 11:26

## 2018-04-09 RX ADMIN — Medication 200 MILLIGRAM(S): at 13:11

## 2018-04-09 RX ADMIN — Medication 6 MILLIGRAM(S): at 16:05

## 2018-04-09 RX ADMIN — OXYCODONE HYDROCHLORIDE 10 MILLIGRAM(S): 5 TABLET ORAL at 00:22

## 2018-04-09 RX ADMIN — Medication 20 MILLIEQUIVALENT(S): at 22:22

## 2018-04-09 RX ADMIN — LISINOPRIL 5 MILLIGRAM(S): 2.5 TABLET ORAL at 17:23

## 2018-04-09 RX ADMIN — OXYCODONE HYDROCHLORIDE 10 MILLIGRAM(S): 5 TABLET ORAL at 20:24

## 2018-04-09 RX ADMIN — Medication 20 MILLIGRAM(S): at 11:26

## 2018-04-09 RX ADMIN — Medication 20 MILLIEQUIVALENT(S): at 20:25

## 2018-04-09 RX ADMIN — Medication 1000 MILLIGRAM(S): at 16:45

## 2018-04-09 RX ADMIN — OXYCODONE HYDROCHLORIDE 10 MILLIGRAM(S): 5 TABLET ORAL at 01:20

## 2018-04-09 RX ADMIN — Medication 6 MILLIGRAM(S): at 05:38

## 2018-04-09 RX ADMIN — Medication 62.5 MILLIMOLE(S): at 00:13

## 2018-04-09 RX ADMIN — Medication 200 MILLIGRAM(S): at 22:22

## 2018-04-09 RX ADMIN — Medication 10 MILLIGRAM(S): at 16:00

## 2018-04-09 RX ADMIN — OXYCODONE HYDROCHLORIDE 10 MILLIGRAM(S): 5 TABLET ORAL at 21:24

## 2018-04-09 NOTE — PROGRESS NOTE ADULT - SUBJECTIVE AND OBJECTIVE BOX
- Patient seen and examined.  - In summary, patient is a 63 year old woman who presented for acoustic neuroma removal (2018 06:29)  - Today, patient is without complaints.         *****MEDICATIONS:    MEDICATIONS  (STANDING):  dexamethasone  Injectable 4 milliGRAM(s) IV Push every 6 hours  docusate sodium 100 milliGRAM(s) Oral three times a day  enoxaparin Injectable 40 milliGRAM(s) SubCutaneous <User Schedule>  FLUoxetine 20 milliGRAM(s) Oral daily  insulin glargine Injectable (LANTUS) 10 Unit(s) SubCutaneous at bedtime  insulin lispro (HumaLOG) corrective regimen sliding scale   SubCutaneous Before meals and at bedtime  labetalol 200 milliGRAM(s) Oral three times a day  pantoprazole  Injectable 40 milliGRAM(s) IV Push daily  petrolatum Ophthalmic Ointment 1 Application(s) Right EYE every 6 hours  polyethylene glycol 3350 17 Gram(s) Oral two times a day  senna 2 Tablet(s) Oral at bedtime    MEDICATIONS  (PRN):  acetaminophen   Tablet 650 milliGRAM(s) Oral every 6 hours PRN For Temp greater than 38 C (100.4 F)  acetaminophen   Tablet. 650 milliGRAM(s) Oral every 6 hours PRN Mild Pain (1 - 3)  benzocaine 15 mG/menthol 3.6 mG Lozenge 1 Lozenge Oral three times a day PRN Sore Throat  hydrALAZINE Injectable 10 milliGRAM(s) IV Push every 6 hours PRN SBP>170  oxyCODONE    IR 5 milliGRAM(s) Oral every 4 hours PRN Moderate Pain (4 - 6)  oxyCODONE    IR 10 milliGRAM(s) Oral every 4 hours PRN Severe Pain (7 - 10)               ***** REVIEW OF SYSTEM:  GEN: no fever, no chills, no pain  RESP: no SOB, no cough, no sputum  CVS: no chest pain, no palpitations, no edema  GI: no abdominal pain, no nausea, no vomiting, no constipation, no diarrhea  : no dysuria, no frequency  NEURO: no headache, no dizziness  PSYCH: no depression, not anxious  Derm : no itching, no rash         ***** VITAL SIGNS:    T(F): 98.2 (18 @ 08:10), Max: 98.2 (18 @ 08:10)  HR: 87 (18 @ 08:10) (63 - 109)  BP: 169/101 (18 @ 08:10) (113/70 - 196/96)  RR: 18 (18 @ 08:10) (12 - 62)  SpO2: 95% (18 @ 08:10) (94% - 99%)  Wt(kg): --  ,   I&O's Summary    2018 07:01  -  2018 07:00  --------------------------------------------------------  IN: 1320 mL / OUT: 350 mL / NET: 970 mL             *****PHYSICAL EXAM:  GEN: A&O X 3 , NAD , comfortable  HEENT: NCAT, EOMI, MMM, no icterus  NECK: Supple, No JVD  CVS: S1S2 , regular , No M/R/G appreciated  PULM: CTA B/L,  no W/R/R appreciated  ABD.: soft. non tender, non distended,  bowel sounds present  Extrem: intact pulses , no edema noted  Derm: No rash or ecchymosis noted  PSYCH: normal mood, no depression, not anxious         *****LAB AND IMAGIN.7   5.1   )-----------( 116      ( 2018 21:25 )             40.3               -09    135  |  97  |  17  ----------------------------<  110<H>  3.6   |  24  |  0.68    Ca    9.0      2018 07:14  Phos  2.3     04-08  Mg     2.0     04-08      [All pertinent recent Imaging/Reports reviewed]         *****A S S E S S M E N T   A N D   P L A N :  63F s/p R crani for resection of likely vestibular schwannoma 18  care per NSx  VSS  cont current meds  recent echo noted  OOB  PT      __________________________  OLENA Fish D.O.

## 2018-04-09 NOTE — CONSULT NOTE ADULT - SUBJECTIVE AND OBJECTIVE BOX
[facility] Ophthalmology Consult Note    HPI: 63 year old female with PMH of breast cancer and HTN with complaints of hearing loss/ dizziness/ unsteady gait found to have  Acoustic neuroma  s/p Right craniotomy for resection of vestiublar schwannoma 4/5/18 with post operative facial paralysis. 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    PMH: HTN, breast CA, acoustic neuroma  Meds: acetaminophen   Tablet. 650 milliGRAM(s) Oral every 6 hours PRN Mild Pain (1 - 3)  FLUoxetine 20 milliGRAM(s) Oral daily  oxyCODONE    IR 5 milliGRAM(s) Oral every 4 hours PRN Moderate Pain (4 - 6)  oxyCODONE    IR 10 milliGRAM(s) Oral every 4 hours PRN Severe Pain (7 - 10)  hydrALAZINE Injectable 10 milliGRAM(s) IV Push every 6 hours PRN SBP>170  labetalol 200 milliGRAM(s) Oral three times a day  docusate sodium 100 milliGRAM(s) Oral three times a day  pantoprazole  Injectable 40 milliGRAM(s) IV Push daily  polyethylene glycol 3350 17 Gram(s) Oral two times a day  senna 2 Tablet(s) Oral at bedtime  artificial tears (preservative free) Ophthalmic Solution 1 Drop(s) Right EYE every 6 hours  dexamethasone  Injectable 4 milliGRAM(s) IV Push every 6 hours  enoxaparin Injectable 40 milliGRAM(s) SubCutaneous <User Schedule>  insulin glargine Injectable (LANTUS) 10 Unit(s) SubCutaneous at bedtime  insulin lispro (HumaLOG) corrective regimen sliding scale   SubCutaneous Before meals and at bedtime  petrolatum Ophthalmic Ointment 1 Application(s) Right EYE every 12 hours  POcHx (including surgeries/lasers/trauma):  None  Drops: None  FamHx: None  Social Hx: None  Allergies: NKDA    ROS:  General (neg), Vision (per HPI), Head and Neck (neg), Pulm (neg), CV (neg), GI (neg),  (neg), Musculoskeletal (neg), Skin/Integ (neg), Neuro (neg), Endocrine (neg), Heme (neg), All/Immuno (neg)    Mood and Affect Appropriate ( x ),  Oriented to Time, Place, and Person x 3 ( x )    Ophthalmology Exam    Visual acuity (sc): 20/40 OU  Pupils: PERRL OU, no APD  Ttono: 11 OD, 17 OS (squeezing OS)  Extraocular movements (EOMs): Full OU, no pain, no diplopia   Confrontational Visual Field (CVF):  Full OU      Pen Light Exam (PLE)  External:  Flat OU  Lids/Lashes/Lacrimal Ducts: 1mm lagophthalmos OD, Flat OS  Sclera/Conjunctiva:  W+Q OU  Cornea: Cl OU  Anterior Chamber: D+F OU  Iris:  Flat OU  Lens:  Cl OU    Fundus Exam: pt declined dilated exam due to nausea    Diagnostic Testing:        EXAM:  CT BRAIN                            PROCEDURE DATE:  04/09/2018            INTERPRETATION:  CLINICAL INDICATION: Headache, nausea, vomiting. Recent   right vestibular schwannoma resection. Postop day 4.    TECHNIQUE: Axial brain CT was acquired. Sagittal and coronal reformats   were obtained.    COMPARISON: Brain CT from 4/7/2018. Brain MRI from 4/7/2018.    FINDINGS:     The patient is status post right suboccipital craniotomy for resection of   a right-sided vestibular schwannoma. There is redemonstration of edema in   the right cerebellar hemisphere, with mild associated mass effect and   without significant change compared to the prior exam. A small amount of   high density fluid and air are noted in the right cerebellopontine angle   cistern, decreased in conspicuity since the prior exam. Bifrontal   subdural and subarachnoid air is again noted, decreased in conspicuity   since 4/7/2018.    There are no new foci of vasogenic edema in the brain. No new or   increasing extra-axial collection is appreciated. The gray-white   differentiation is preserved. There is no acute hydrocephalus. The   quadrigeminal, suprasellar, and prepontine/and trabecular cisterns are   patent.    Mucosal thickening is noted in the right maxillary sinus. There is a left   maxillary sinus polyp versus mucus retention cyst. Remainder the   visualized paranasal sinuses mastoid air cells are clear. .    IMPRESSION:  Status post excision of right vestibular schwannoma with right cerebellar   postoperative lucency unchanged from 4/7/2018      . This exam was discussed by Dr. Bermudez with CURLY Pederson ([employer]   [id number]) on 4/9/2018 at 1636 hours with readback.

## 2018-04-09 NOTE — OCCUPATIONAL THERAPY INITIAL EVALUATION ADULT - ADL RETRAINING, OT EVAL
Pt will perform toilet transfer & hygiene with I, within 2 weeks, Pt will complete grooming task with I, standing at sink within 4 weeks

## 2018-04-09 NOTE — CHART NOTE - NSCHARTNOTEFT_GEN_A_CORE
Called by RN Patient with sudden onset nausea /108.   Alert oriented x3. LONG. abdomen soft nontender + BS  Zofran, Hydralazine 10mg x1 and decadron 6mgx1 given   Stat CT head. BMP stat. NS @100cc/hr  Dr Medina neurosurgery resident aware

## 2018-04-09 NOTE — PROGRESS NOTE ADULT - SUBJECTIVE AND OBJECTIVE BOX
SUBJECTIVE:   c/o mild  nausea. Ate >50% of meal  OVERNIGHT EVENTS: none    Vital Signs Last 24 Hrs  T(C): 36.6 (09 Apr 2018 12:21), Max: 36.8 (09 Apr 2018 08:10)  T(F): 97.9 (09 Apr 2018 12:21), Max: 98.2 (09 Apr 2018 08:10)  HR: 66 (09 Apr 2018 12:53) (63 - 87)  BP: 173/83 (09 Apr 2018 12:53) (113/70 - 190/98)  BP(mean): 109 (08 Apr 2018 23:00) (87 - 143)  RR: 18 (09 Apr 2018 12:21) (12 - 62)  SpO2: 97% (09 Apr 2018 12:21) (94% - 99%)    PHYSICAL EXAM:    Constitutional: No Acute Distress     Neurological: AOx3, Following Commands, Moving all Extremities 5/5.  Right facial House Brackman 4 with incomplete eye closure. decreased sensation Rside face    Pulmonary: Clear to Auscultation,     Cardiovascular: S1, S2, +   Gastrointestinal: Soft, Non-tender, Non-distended     Extremities: No calf tenderness         LABS:                        13.7   5.1   )-----------( 116      ( 08 Apr 2018 21:25 )             40.3    04-09    135  |  97  |  17  ----------------------------<  110<H>  3.6   |  24  |  0.68    Ca    9.0      09 Apr 2018 07:14  Phos  2.3     04-08  Mg     2.0     04-08          IMAGING:         MEDICATIONS:    acetaminophen   Tablet. 650 milliGRAM(s) Oral every 6 hours PRN Mild Pain (1 - 3)  FLUoxetine 20 milliGRAM(s) Oral daily  oxyCODONE    IR 5 milliGRAM(s) Oral every 4 hours PRN Moderate Pain (4 - 6)  oxyCODONE    IR 10 milliGRAM(s) Oral every 4 hours PRN Severe Pain (7 - 10)  hydrALAZINE Injectable 10 milliGRAM(s) IV Push every 6 hours PRN SBP>170  labetalol 200 milliGRAM(s) Oral three times a day  docusate sodium 100 milliGRAM(s) Oral three times a day  pantoprazole  Injectable 40 milliGRAM(s) IV Push daily  polyethylene glycol 3350 17 Gram(s) Oral two times a day  senna 2 Tablet(s) Oral at bedtime  artificial tears (preservative free) Ophthalmic Solution 1 Drop(s) Right EYE every 6 hours  dexamethasone  Injectable 4 milliGRAM(s) IV Push every 6 hours  enoxaparin Injectable 40 milliGRAM(s) SubCutaneous <User Schedule>  insulin glargine Injectable (LANTUS) 10 Unit(s) SubCutaneous at bedtime  insulin lispro (HumaLOG) corrective regimen sliding scale   SubCutaneous Before meals and at bedtime  petrolatum Ophthalmic Ointment 1 Application(s) Right EYE every 12 hours      DIET:

## 2018-04-09 NOTE — OCCUPATIONAL THERAPY INITIAL EVALUATION ADULT - LIVES WITH, PROFILE
parents/Pt lives in mobile home with mother, 3 steps to enter, walk in shower. Pt I in ADLs and ambulation prior to admission

## 2018-04-09 NOTE — PROGRESS NOTE ADULT - ASSESSMENT
63 year old female with PMH of breast cancer and HTN with complaints of hearing loss/ dizziness/ unsteady gait found to have  Acoustic neuroma  s/p Right craniotomy for resection of vestiublar schwannoma 4/5/18 with post operative facial paralysis    Plan:  Neuro - Decadron taper to 4mg q6.   HTN- On Labetalol 200q8, -170. Will d/w Dr Polina RAMIREZ facial- Optho consult called. Soft diet.   Steroid induced hyperglycemia-d/c NPH q6. Lantus 10u tonight. Low sliding scale  DVT ppx  D/W dr Jacobo at bedside  PT/OT- outpatient PT  Possible d/c home in am

## 2018-04-09 NOTE — CONSULT NOTE ADULT - ASSESSMENT
A/P: 63F s/p resection of left vestibular schwannoma with right facial nerve palsy. Mild exposure keratopathy noted OD. Pt declined dilated exam.  - increase lacrilube to QID OD  - will follow while admitted    Follow-Up:  Patient should follow up his/her ophthalmologist or in the [facility] within 1 week of discharge.  [address] [place] [id number]  [phone]    D/W Dr Hdez (attending)

## 2018-04-10 ENCOUNTER — TRANSCRIPTION ENCOUNTER (OUTPATIENT)
Age: 63
End: 2018-04-10

## 2018-04-10 VITALS — TEMPERATURE: 98 F | HEART RATE: 65 BPM | OXYGEN SATURATION: 97 % | RESPIRATION RATE: 18 BRPM

## 2018-04-10 LAB
ANION GAP SERPL CALC-SCNC: 15 MMOL/L — SIGNIFICANT CHANGE UP (ref 5–17)
BUN SERPL-MCNC: 18 MG/DL — SIGNIFICANT CHANGE UP (ref 7–23)
CALCIUM SERPL-MCNC: 9.5 MG/DL — SIGNIFICANT CHANGE UP (ref 8.4–10.5)
CHLORIDE SERPL-SCNC: 96 MMOL/L — SIGNIFICANT CHANGE UP (ref 96–108)
CO2 SERPL-SCNC: 21 MMOL/L — LOW (ref 22–31)
CREAT SERPL-MCNC: 0.7 MG/DL — SIGNIFICANT CHANGE UP (ref 0.5–1.3)
GLUCOSE BLDC GLUCOMTR-MCNC: 112 MG/DL — HIGH (ref 70–99)
GLUCOSE SERPL-MCNC: 142 MG/DL — HIGH (ref 70–99)
HCT VFR BLD CALC: 37.7 % — SIGNIFICANT CHANGE UP (ref 34.5–45)
HGB BLD-MCNC: 12.7 G/DL — SIGNIFICANT CHANGE UP (ref 11.5–15.5)
MCHC RBC-ENTMCNC: 33.7 GM/DL — SIGNIFICANT CHANGE UP (ref 32–36)
MCHC RBC-ENTMCNC: 34 PG — SIGNIFICANT CHANGE UP (ref 27–34)
MCV RBC AUTO: 101.1 FL — HIGH (ref 80–100)
PLATELET # BLD AUTO: 146 K/UL — LOW (ref 150–400)
POTASSIUM SERPL-MCNC: 4.4 MMOL/L — SIGNIFICANT CHANGE UP (ref 3.5–5.3)
POTASSIUM SERPL-SCNC: 4.4 MMOL/L — SIGNIFICANT CHANGE UP (ref 3.5–5.3)
RBC # BLD: 3.73 M/UL — LOW (ref 3.8–5.2)
RBC # FLD: 13.6 % — SIGNIFICANT CHANGE UP (ref 10.3–14.5)
SODIUM SERPL-SCNC: 132 MMOL/L — LOW (ref 135–145)
WBC # BLD: 5.06 K/UL — SIGNIFICANT CHANGE UP (ref 3.8–10.5)
WBC # FLD AUTO: 5.06 K/UL — SIGNIFICANT CHANGE UP (ref 3.8–10.5)

## 2018-04-10 PROCEDURE — 88307 TISSUE EXAM BY PATHOLOGIST: CPT

## 2018-04-10 PROCEDURE — 84295 ASSAY OF SERUM SODIUM: CPT

## 2018-04-10 PROCEDURE — 82803 BLOOD GASES ANY COMBINATION: CPT

## 2018-04-10 PROCEDURE — 82435 ASSAY OF BLOOD CHLORIDE: CPT

## 2018-04-10 PROCEDURE — 70450 CT HEAD/BRAIN W/O DYE: CPT

## 2018-04-10 PROCEDURE — A9585: CPT

## 2018-04-10 PROCEDURE — C1713: CPT

## 2018-04-10 PROCEDURE — 85014 HEMATOCRIT: CPT

## 2018-04-10 PROCEDURE — 82947 ASSAY GLUCOSE BLOOD QUANT: CPT

## 2018-04-10 PROCEDURE — 85610 PROTHROMBIN TIME: CPT

## 2018-04-10 PROCEDURE — 84100 ASSAY OF PHOSPHORUS: CPT

## 2018-04-10 PROCEDURE — 82565 ASSAY OF CREATININE: CPT

## 2018-04-10 PROCEDURE — 85730 THROMBOPLASTIN TIME PARTIAL: CPT

## 2018-04-10 PROCEDURE — 86901 BLOOD TYPING SEROLOGIC RH(D): CPT

## 2018-04-10 PROCEDURE — 93970 EXTREMITY STUDY: CPT

## 2018-04-10 PROCEDURE — 82330 ASSAY OF CALCIUM: CPT

## 2018-04-10 PROCEDURE — C1889: CPT

## 2018-04-10 PROCEDURE — 84132 ASSAY OF SERUM POTASSIUM: CPT

## 2018-04-10 PROCEDURE — 70553 MRI BRAIN STEM W/O & W/DYE: CPT

## 2018-04-10 PROCEDURE — C1769: CPT

## 2018-04-10 PROCEDURE — 83605 ASSAY OF LACTIC ACID: CPT

## 2018-04-10 PROCEDURE — P9041: CPT

## 2018-04-10 PROCEDURE — 82962 GLUCOSE BLOOD TEST: CPT

## 2018-04-10 PROCEDURE — 97161 PT EVAL LOW COMPLEX 20 MIN: CPT

## 2018-04-10 PROCEDURE — 83036 HEMOGLOBIN GLYCOSYLATED A1C: CPT

## 2018-04-10 PROCEDURE — 85027 COMPLETE CBC AUTOMATED: CPT

## 2018-04-10 PROCEDURE — 82009 KETONE BODYS QUAL: CPT

## 2018-04-10 PROCEDURE — 86900 BLOOD TYPING SEROLOGIC ABO: CPT

## 2018-04-10 PROCEDURE — 97165 OT EVAL LOW COMPLEX 30 MIN: CPT

## 2018-04-10 PROCEDURE — 94002 VENT MGMT INPAT INIT DAY: CPT

## 2018-04-10 PROCEDURE — 83735 ASSAY OF MAGNESIUM: CPT

## 2018-04-10 PROCEDURE — 70250 X-RAY EXAM OF SKULL: CPT

## 2018-04-10 PROCEDURE — 80048 BASIC METABOLIC PNL TOTAL CA: CPT

## 2018-04-10 RX ORDER — PANTOPRAZOLE SODIUM 20 MG/1
1 TABLET, DELAYED RELEASE ORAL
Qty: 10 | Refills: 0 | OUTPATIENT
Start: 2018-04-10

## 2018-04-10 RX ORDER — LABETALOL HCL 100 MG
1 TABLET ORAL
Qty: 60 | Refills: 0 | OUTPATIENT
Start: 2018-04-10 | End: 2018-05-09

## 2018-04-10 RX ORDER — LABETALOL HCL 100 MG
1 TABLET ORAL
Qty: 0 | Refills: 0 | COMMUNITY

## 2018-04-10 RX ORDER — ASPIRIN/CALCIUM CARB/MAGNESIUM 324 MG
1 TABLET ORAL
Qty: 0 | Refills: 0 | COMMUNITY

## 2018-04-10 RX ORDER — PANTOPRAZOLE SODIUM 20 MG/1
40 TABLET, DELAYED RELEASE ORAL DAILY
Qty: 0 | Refills: 0 | Status: DISCONTINUED | OUTPATIENT
Start: 2018-04-10 | End: 2018-04-10

## 2018-04-10 RX ORDER — DEXAMETHASONE 0.5 MG/5ML
1 ELIXIR ORAL
Qty: 20 | Refills: 0 | OUTPATIENT
Start: 2018-04-10

## 2018-04-10 RX ORDER — DEXAMETHASONE 0.5 MG/5ML
4 ELIXIR ORAL EVERY 8 HOURS
Qty: 0 | Refills: 0 | Status: DISCONTINUED | OUTPATIENT
Start: 2018-04-10 | End: 2018-04-10

## 2018-04-10 RX ORDER — ACETAMINOPHEN 500 MG
2 TABLET ORAL
Qty: 0 | Refills: 0 | COMMUNITY

## 2018-04-10 RX ORDER — LISINOPRIL 2.5 MG/1
1 TABLET ORAL
Qty: 30 | Refills: 0 | OUTPATIENT
Start: 2018-04-10

## 2018-04-10 RX ADMIN — Medication 1 DROP(S): at 11:41

## 2018-04-10 RX ADMIN — Medication 1 APPLICATION(S): at 11:41

## 2018-04-10 RX ADMIN — DEXTROSE MONOHYDRATE, SODIUM CHLORIDE, AND POTASSIUM CHLORIDE 125 MILLILITER(S): 50; .745; 4.5 INJECTION, SOLUTION INTRAVENOUS at 06:44

## 2018-04-10 RX ADMIN — PANTOPRAZOLE SODIUM 40 MILLIGRAM(S): 20 TABLET, DELAYED RELEASE ORAL at 11:41

## 2018-04-10 RX ADMIN — Medication 4 MILLIGRAM(S): at 00:11

## 2018-04-10 RX ADMIN — Medication 20 MILLIEQUIVALENT(S): at 00:11

## 2018-04-10 RX ADMIN — Medication 100 MILLIGRAM(S): at 06:42

## 2018-04-10 RX ADMIN — Medication 200 MILLIGRAM(S): at 12:25

## 2018-04-10 RX ADMIN — POLYETHYLENE GLYCOL 3350 17 GRAM(S): 17 POWDER, FOR SOLUTION ORAL at 11:41

## 2018-04-10 RX ADMIN — Medication 200 MILLIGRAM(S): at 06:41

## 2018-04-10 RX ADMIN — Medication 20 MILLIGRAM(S): at 11:41

## 2018-04-10 RX ADMIN — LISINOPRIL 5 MILLIGRAM(S): 2.5 TABLET ORAL at 06:41

## 2018-04-10 RX ADMIN — Medication 4 MILLIGRAM(S): at 06:42

## 2018-04-10 NOTE — PROGRESS NOTE ADULT - PROVIDER SPECIALTY LIST ADULT
Cardiology
NSICU
Neurosurgery
Cardiology

## 2018-04-10 NOTE — DISCHARGE NOTE ADULT - CARE PROVIDERS DIRECT ADDRESSES
[email or web address],DirectAddress_Unknown

## 2018-04-10 NOTE — DISCHARGE NOTE ADULT - MEDICATION SUMMARY - MEDICATIONS TO TAKE
I will START or STAY ON the medications listed below when I get home from the hospital:    dexamethasone 4 mg oral tablet  -- 1 tab(s) by mouth every 8 hours x 2 days then 1 tab q12h x 2 days then 1/2 tab q12  x2 days then stop  -- Indication: For steroids to decrease brain swelling    Tylenol 500 mg oral tablet  -- 2 tab(s) by mouth every 6 hours, As Needed - for headache  -- Indication: For Headaches    lisinopril 5 mg oral tablet  -- 1 tab(s) by mouth once a day  -- Indication: For Hypertension    PROzac 20 mg oral capsule  -- 1 cap(s) by mouth once a day  -- Indication: For depression    labetalol 300 mg oral tablet  -- 1 tab(s) by mouth 2 times a day   -- Indication: For Hypertension    ocular lubricant ophthalmic ointment  -- 1 application to right  eye every 6 hours  -- Indication: For Inability to close right eye completely    pantoprazole 40 mg oral delayed release tablet  -- 1 tab(s) by mouth once a day while on steroids  -- Indication: For prevent stomach ulcers on steroids

## 2018-04-10 NOTE — DISCHARGE NOTE ADULT - MEDICATION SUMMARY - MEDICATIONS TO STOP TAKING
I will STOP taking the medications listed below when I get home from the hospital:    Aspir 81 oral delayed release tablet  -- 1 tab(s) by mouth once a day ( patient stopped aspirin last week as per Dr. Jacobo)

## 2018-04-10 NOTE — DISCHARGE NOTE ADULT - NS AS DC STROKE ED MATERIALS
Risk Factors for Stroke/Prescribed Medications/Stroke Warning Signs and Symptoms/Call 911 for Stroke/Need for Followup After Discharge/Stroke Education Booklet

## 2018-04-10 NOTE — DISCHARGE NOTE ADULT - PATIENT PORTAL LINK FT
You can access the [facility] Patient Portal, offered by [facility], by registering with the following website: [email or web address]

## 2018-04-10 NOTE — DISCHARGE NOTE ADULT - CARE PLAN
Principal Discharge DX:	Vestibular schwannoma Principal Discharge DX:	Vestibular schwannoma  Goal:	s/p Right craniotomy for resection of vestibular schwannoma 4/5/18  Assessment and plan of treatment:	Keep Incision Clean and Dry. May shower .pat dry after. no creams or lotions on incision. No immersion baths..  No strenous activity. No heavy lifting. Do not return to work until cleared by physician. No driving until cleared by physician.  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 please call for appointment

## 2018-04-10 NOTE — PROGRESS NOTE ADULT - ASSESSMENT
63 year old female with PMH of breast cancer and HTN with complaints of hearing loss/ dizziness/ unsteady gait found to have  Acoustic neuroma  s/p Right craniotomy for resection of vestiublar schwannoma 4/5/18 with post operative facial paralysis    Plan:  Neuro - Decadron taper to 4mg q8.   HTN- On Labetalol 200q8, and Lisnopril 5mg. Bp better controlled   Lagothlamous- Lacrilube q6   Steroid induced hyperglycemia-blood sugars better. Steroid taper in progress. no insulin for discharge  DVT ppx  PT/OT- outpatient PT   d/c home

## 2018-04-10 NOTE — DISCHARGE NOTE ADULT - ADDITIONAL INSTRUCTIONS
Return to ER if develops fevers, bleeding , wound drainage, uncontrolled pain, weakness of extremities, lethargy or sluggishness..  Incision evaluation and suture removal at Dr Jacobo office in 10 days.   Follow up with Dr Au. Neuroophthalmology in 1 week

## 2018-04-10 NOTE — DISCHARGE NOTE ADULT - HOSPITAL COURSE
63 year old female with PMH of breast cancer and HTN with complaints of hearing loss/ dizziness/ unsteady gait found to have  Acoustic neuroma  s/p Right craniotomy for resection of vestibular schwannoma 4/5/18 with post operative facial paralysis and lagophthalmos . Post operative MRI brain 4/7/18 with expected post operative changes. Followed by cardiology inhouse. BP meds adjusted for hypertension. Opthalmology consulted for lagophthalmos. Patient with nausea/ vomiting and  headaches on 4/9/18. Repeat head CT stable. Slow steroid taper. Evaluated by PT/OT and discharged home in stable condition.

## 2018-04-10 NOTE — PROGRESS NOTE ADULT - SUBJECTIVE AND OBJECTIVE BOX
SUBJECTIVE:   No complaints. nausea/ vomiting resolved. +BM  OVERNIGHT EVENTS: none    Vital Signs Last 24 Hrs  T(C): 36.5 (10 Apr 2018 12:05), Max: 36.9 (09 Apr 2018 19:31)  T(F): 97.7 (10 Apr 2018 12:05), Max: 98.4 (09 Apr 2018 19:31)  HR: 65 (10 Apr 2018 12:05) (65 - 77)  BP: 157/90 (10 Apr 2018 07:38) (137/80 - 200/124)  BP(mean): --  RR: 18 (10 Apr 2018 12:05) (18 - 18)  SpO2: 97% (10 Apr 2018 12:05) (94% - 98%)    PHYSICAL EXAM:  Neurological: AOx3, Following Commands, Moving all Extremities 5/5.  Right facial House Brackman 4 with incomplete eye closure. decreased sensation Rside face    Pulmonary: Clear to Auscultation,     Cardiovascular: S1, S2, +   Gastrointestinal: Soft, Non-tender, Non-distended     Extremities: No calf tenderness     Incision:   DRAINS:     LABS:                        12.7   5.06  )-----------( 146      ( 10 Apr 2018 07:45 )             37.7    04-10    132<L>  |  96  |  18  ----------------------------<  142<H>  4.4   |  21<L>  |  0.70    Ca    9.5      10 Apr 2018 06:12  Phos  2.3     04-08  Mg     2.0     04-08 04-09 @ 07:01  -  04-10 @ 07:00  --------------------------------------------------------  IN: 1860 mL / OUT: 0 mL / NET: 1860 mL    04-10 @ 07:01  -  04-10 @ 13:19  --------------------------------------------------------  IN: 120 mL / OUT: 0 mL / NET: 120 mL      IMAGING:         MEDICATIONS:    FLUoxetine 20 milliGRAM(s) Oral daily  oxyCODONE    IR 5 milliGRAM(s) Oral every 4 hours PRN Moderate Pain (4 - 6)  oxyCODONE    IR 10 milliGRAM(s) Oral every 4 hours PRN Severe Pain (7 - 10)  labetalol 200 milliGRAM(s) Oral three times a day  lisinopril 5 milliGRAM(s) Oral daily  docusate sodium 100 milliGRAM(s) Oral three times a day  pantoprazole    Tablet 40 milliGRAM(s) Oral daily  polyethylene glycol 3350 17 Gram(s) Oral two times a day  senna 2 Tablet(s) Oral at bedtime  artificial  tears Solution 1 Drop(s) Right EYE every 6 hours  dexamethasone     Tablet 4 milliGRAM(s) Oral every 8 hours  enoxaparin Injectable 40 milliGRAM(s) SubCutaneous <User Schedule>  insulin glargine Injectable (LANTUS) 10 Unit(s) SubCutaneous at bedtime  insulin lispro (HumaLOG) corrective regimen sliding scale   SubCutaneous Before meals and at bedtime  petrolatum Ophthalmic Ointment 1 Application(s) Right EYE every 6 hours      DIET:

## 2018-04-10 NOTE — DISCHARGE NOTE ADULT - PLAN OF CARE
s/p Right craniotomy for resection of vestibular schwannoma 4/5/18 Keep Incision Clean and Dry. May shower .pat dry after. no creams or lotions on incision. No immersion baths..  No strenous activity. No heavy lifting. Do not return to work until cleared by physician. No driving until cleared by physician. Continue lacrilube right eye  Chew food on left side  Soft diet  Follow up with opthalmology in 1 wek. please call for appointment

## 2018-04-10 NOTE — DISCHARGE NOTE ADULT - NS AS ACTIVITY OBS
Showering allowed/Walking-Indoors allowed/No Heavy lifting/straining/Stairs allowed/Walking-Outdoors allowed

## 2018-04-10 NOTE — PROGRESS NOTE ADULT - SUBJECTIVE AND OBJECTIVE BOX
- Patient seen and examined.  - In summary, patient is a 63 year old woman who presented for acoustic neuroma removal (2018 06:29)  - Today, patient is without complaints.         *****MEDICATIONS:    MEDICATIONS  (STANDING):  artificial  tears Solution 1 Drop(s) Right EYE every 6 hours  dexamethasone     Tablet 4 milliGRAM(s) Oral every 8 hours  docusate sodium 100 milliGRAM(s) Oral three times a day  enoxaparin Injectable 40 milliGRAM(s) SubCutaneous <User Schedule>  FLUoxetine 20 milliGRAM(s) Oral daily  insulin glargine Injectable (LANTUS) 10 Unit(s) SubCutaneous at bedtime  insulin lispro (HumaLOG) corrective regimen sliding scale   SubCutaneous Before meals and at bedtime  labetalol 200 milliGRAM(s) Oral three times a day  lisinopril 5 milliGRAM(s) Oral daily  pantoprazole    Tablet 40 milliGRAM(s) Oral daily  petrolatum Ophthalmic Ointment 1 Application(s) Right EYE every 12 hours  polyethylene glycol 3350 17 Gram(s) Oral two times a day  senna 2 Tablet(s) Oral at bedtime  sodium chloride 0.9% with potassium chloride 20 mEq/L 1000 milliLiter(s) (125 mL/Hr) IV Continuous <Continuous>    MEDICATIONS  (PRN):  benzocaine 15 mG/menthol 3.6 mG Lozenge 1 Lozenge Oral three times a day PRN Sore Throat  metoclopramide Injectable 10 milliGRAM(s) IV Push every 8 hours PRN NAUSEA  ondansetron Injectable 4 milliGRAM(s) IV Push every 6 hours PRN Nausea and/or Vomiting  oxyCODONE    IR 5 milliGRAM(s) Oral every 4 hours PRN Moderate Pain (4 - 6)  oxyCODONE    IR 10 milliGRAM(s) Oral every 4 hours PRN Severe Pain (7 - 10)             ***** REVIEW OF SYSTEM:  GEN: no fever, no chills, no pain  RESP: no SOB, no cough, no sputum  CVS: no chest pain, no palpitations, no edema  GI: no abdominal pain, no nausea, no vomiting, no constipation, no diarrhea  : no dysuria, no frequency  NEURO: no headache, no dizziness  PSYCH: no depression, not anxious  Derm : no itching, no rash         ***** VITAL SIGNS:    T(F): 98.4 (04-10-18 @ 07:38), Max: 98.4 (18 @ 19:31)  HR: 75 (04-10-18 @ 07:38) (66 - 77)  BP: 157/90 (04-10-18 @ 07:38) (137/80 - 200/124)  RR: 18 (04-10-18 @ 07:38) (18 - 18)  SpO2: 98% (04-10-18 @ 07:38) (94% - 98%)  Wt(kg): --  ,   I&O's Summary    2018 07:01  -  10 Apr 2018 07:00  --------------------------------------------------------  IN: 1860 mL / OUT: 0 mL / NET: 1860 mL                   *****PHYSICAL EXAM:  GEN: A&O X 3 , NAD , comfortable  HEENT: NCAT, EOMI, MMM, no icterus  NECK: Supple, No JVD  CVS: S1S2 , regular , No M/R/G appreciated  PULM: CTA B/L,  no W/R/R appreciated  ABD.: soft. non tender, non distended,  bowel sounds present  Extrem: intact pulses , no edema noted  Derm: No rash or ecchymosis noted  PSYCH: normal mood, no depression, not anxious         *****LAB AND IMAGIN.7   5.06  )-----------( 146      ( 10 Apr 2018 07:45 )             37.7               04-10    132<L>  |  96  |  18  ----------------------------<  142<H>  4.4   |  21<L>  |  0.70    Ca    9.5      10 Apr 2018 06:12  Phos  2.3     04-08  Mg     2.0     -08    [All pertinent recent Imaging/Reports reviewed]         *****A S S E S S M E N T   A N D   P L A N :  63F s/p R crani for resection of likely vestibular schwannoma 18  care per NSx  started lisinopril for HTN  BP improved  cont current meds  recent echo noted  OOB  PT  DCP per NSx  f/u Dr Veliz in 2 weeks    __________________________  A. ROMANA Fish.

## 2018-04-10 NOTE — DISCHARGE NOTE ADULT - CARE PROVIDER_API CALL
Gayatri Jacobo), Neurosurgery  General  [address]  [address] [place] [address]  Phone: [phone]  Fax: [phone]    Mulugeta Au (MD), Ophthalmology  [address] [facility]  [address]  [place] [address]  Phone: [phone]  Fax: [phone]    Emiliano Veliz (DO), Cardiology Medicine  [address] [facility]  [address]  [place] [address]  Phone: [phone]  Fax: [phone]

## 2018-04-17 ENCOUNTER — INPATIENT (INPATIENT)
Facility: HOSPITAL | Age: 63
LOS: 14 days | Discharge: HOME | DRG: 862 | End: 2018-05-02
Attending: NEUROLOGICAL SURGERY | Admitting: NEUROLOGICAL SURGERY
Payer: COMMERCIAL

## 2018-04-17 VITALS
OXYGEN SATURATION: 98 % | HEART RATE: 156 BPM | RESPIRATION RATE: 24 BRPM | TEMPERATURE: 101 F | DIASTOLIC BLOOD PRESSURE: 88 MMHG | SYSTOLIC BLOOD PRESSURE: 158 MMHG

## 2018-04-17 DIAGNOSIS — Z98.891 HISTORY OF UTERINE SCAR FROM PREVIOUS SURGERY: Chronic | ICD-10-CM

## 2018-04-17 DIAGNOSIS — G97.82 OTHER POSTPROCEDURAL COMPLICATIONS AND DISORDERS OF NERVOUS SYSTEM: ICD-10-CM

## 2018-04-17 DIAGNOSIS — Z98.890 OTHER SPECIFIED POSTPROCEDURAL STATES: Chronic | ICD-10-CM

## 2018-04-17 LAB
ALBUMIN SERPL ELPH-MCNC: 4.1 G/DL — SIGNIFICANT CHANGE UP (ref 3.3–5)
ALP SERPL-CCNC: 69 U/L — SIGNIFICANT CHANGE UP (ref 40–120)
ALT FLD-CCNC: 61 U/L — HIGH (ref 10–45)
ANION GAP SERPL CALC-SCNC: 15 MMOL/L — SIGNIFICANT CHANGE UP (ref 5–17)
ANION GAP SERPL CALC-SCNC: 17 MMOL/L — SIGNIFICANT CHANGE UP (ref 5–17)
APTT BLD: 24.6 SEC — LOW (ref 27.5–37.4)
AST SERPL-CCNC: 88 U/L — HIGH (ref 10–40)
BASOPHILS # BLD AUTO: 0 K/UL — SIGNIFICANT CHANGE UP (ref 0–0.2)
BILIRUB SERPL-MCNC: 2.2 MG/DL — HIGH (ref 0.2–1.2)
BLD GP AB SCN SERPL QL: NEGATIVE — SIGNIFICANT CHANGE UP
BUN SERPL-MCNC: 10 MG/DL — SIGNIFICANT CHANGE UP (ref 7–23)
BUN SERPL-MCNC: 10 MG/DL — SIGNIFICANT CHANGE UP (ref 7–23)
CALCIUM SERPL-MCNC: 8.9 MG/DL — SIGNIFICANT CHANGE UP (ref 8.4–10.5)
CALCIUM SERPL-MCNC: 8.9 MG/DL — SIGNIFICANT CHANGE UP (ref 8.4–10.5)
CHLORIDE SERPL-SCNC: 93 MMOL/L — LOW (ref 96–108)
CHLORIDE SERPL-SCNC: 95 MMOL/L — LOW (ref 96–108)
CO2 SERPL-SCNC: 22 MMOL/L — SIGNIFICANT CHANGE UP (ref 22–31)
CO2 SERPL-SCNC: 24 MMOL/L — SIGNIFICANT CHANGE UP (ref 22–31)
CREAT SERPL-MCNC: 0.74 MG/DL — SIGNIFICANT CHANGE UP (ref 0.5–1.3)
CREAT SERPL-MCNC: 0.83 MG/DL — SIGNIFICANT CHANGE UP (ref 0.5–1.3)
EOSINOPHIL # BLD AUTO: 0 K/UL — SIGNIFICANT CHANGE UP (ref 0–0.5)
GLUCOSE SERPL-MCNC: 137 MG/DL — HIGH (ref 70–99)
GLUCOSE SERPL-MCNC: 140 MG/DL — HIGH (ref 70–99)
HCG SERPL-ACNC: 3.5 MIU/ML — LOW (ref 5–24)
HCT VFR BLD CALC: 44 % — SIGNIFICANT CHANGE UP (ref 34.5–45)
HGB BLD-MCNC: 15.3 G/DL — SIGNIFICANT CHANGE UP (ref 11.5–15.5)
INR BLD: 1.02 RATIO — SIGNIFICANT CHANGE UP (ref 0.88–1.16)
LYMPHOCYTES # BLD AUTO: 0.8 K/UL — LOW (ref 1–3.3)
LYMPHOCYTES # BLD AUTO: 2 % — LOW (ref 13–44)
MCHC RBC-ENTMCNC: 34.7 GM/DL — SIGNIFICANT CHANGE UP (ref 32–36)
MCHC RBC-ENTMCNC: 35.8 PG — HIGH (ref 27–34)
MCV RBC AUTO: 103 FL — HIGH (ref 80–100)
MONOCYTES # BLD AUTO: 1 K/UL — HIGH (ref 0–0.9)
MONOCYTES NFR BLD AUTO: 2 % — SIGNIFICANT CHANGE UP (ref 2–14)
NEUTROPHILS # BLD AUTO: 23 K/UL — HIGH (ref 1.8–7.4)
NEUTROPHILS NFR BLD AUTO: 96 % — HIGH (ref 43–77)
PLAT MORPH BLD: NORMAL — SIGNIFICANT CHANGE UP
PLATELET # BLD AUTO: 400 K/UL — SIGNIFICANT CHANGE UP (ref 150–400)
POTASSIUM SERPL-MCNC: 3.5 MMOL/L — SIGNIFICANT CHANGE UP (ref 3.5–5.3)
POTASSIUM SERPL-MCNC: 6.9 MMOL/L — CRITICAL HIGH (ref 3.5–5.3)
POTASSIUM SERPL-SCNC: 3.5 MMOL/L — SIGNIFICANT CHANGE UP (ref 3.5–5.3)
POTASSIUM SERPL-SCNC: 6.9 MMOL/L — CRITICAL HIGH (ref 3.5–5.3)
PROT SERPL-MCNC: 7.6 G/DL — SIGNIFICANT CHANGE UP (ref 6–8.3)
PROTHROM AB SERPL-ACNC: 11 SEC — SIGNIFICANT CHANGE UP (ref 9.8–12.7)
RBC # BLD: 4.27 M/UL — SIGNIFICANT CHANGE UP (ref 3.8–5.2)
RBC # FLD: 12.3 % — SIGNIFICANT CHANGE UP (ref 10.3–14.5)
RBC BLD AUTO: SIGNIFICANT CHANGE UP
RH IG SCN BLD-IMP: POSITIVE — SIGNIFICANT CHANGE UP
RH IG SCN BLD-IMP: POSITIVE — SIGNIFICANT CHANGE UP
SODIUM SERPL-SCNC: 132 MMOL/L — LOW (ref 135–145)
SODIUM SERPL-SCNC: 134 MMOL/L — LOW (ref 135–145)
WBC # BLD: 24.9 K/UL — HIGH (ref 3.8–10.5)
WBC # FLD AUTO: 24.9 K/UL — HIGH (ref 3.8–10.5)

## 2018-04-17 PROCEDURE — 95819 EEG AWAKE AND ASLEEP: CPT | Mod: 26

## 2018-04-17 PROCEDURE — 99291 CRITICAL CARE FIRST HOUR: CPT

## 2018-04-17 PROCEDURE — 93010 ELECTROCARDIOGRAM REPORT: CPT

## 2018-04-17 PROCEDURE — 70450 CT HEAD/BRAIN W/O DYE: CPT | Mod: 26

## 2018-04-17 PROCEDURE — 99285 EMERGENCY DEPT VISIT HI MDM: CPT | Mod: 25

## 2018-04-17 RX ORDER — ENOXAPARIN SODIUM 100 MG/ML
40 INJECTION SUBCUTANEOUS
Qty: 0 | Refills: 0 | Status: DISCONTINUED | OUTPATIENT
Start: 2018-04-18 | End: 2018-05-02

## 2018-04-17 RX ORDER — SODIUM CHLORIDE 9 MG/ML
1000 INJECTION INTRAMUSCULAR; INTRAVENOUS; SUBCUTANEOUS ONCE
Qty: 0 | Refills: 0 | Status: COMPLETED | OUTPATIENT
Start: 2018-04-17 | End: 2018-04-17

## 2018-04-17 RX ORDER — NYSTATIN CREAM 100000 [USP'U]/G
1 CREAM TOPICAL
Qty: 0 | Refills: 0 | Status: DISCONTINUED | OUTPATIENT
Start: 2018-04-17 | End: 2018-05-02

## 2018-04-17 RX ORDER — SODIUM CHLORIDE 9 MG/ML
1000 INJECTION, SOLUTION INTRAVENOUS ONCE
Qty: 0 | Refills: 0 | Status: COMPLETED | OUTPATIENT
Start: 2018-04-17 | End: 2018-04-17

## 2018-04-17 RX ORDER — ACETAMINOPHEN 500 MG
650 TABLET ORAL ONCE
Qty: 0 | Refills: 0 | Status: COMPLETED | OUTPATIENT
Start: 2018-04-17 | End: 2018-04-17

## 2018-04-17 RX ORDER — DEXTROSE MONOHYDRATE, SODIUM CHLORIDE, AND POTASSIUM CHLORIDE 50; .745; 4.5 G/1000ML; G/1000ML; G/1000ML
1000 INJECTION, SOLUTION INTRAVENOUS
Qty: 0 | Refills: 0 | Status: DISCONTINUED | OUTPATIENT
Start: 2018-04-17 | End: 2018-04-18

## 2018-04-17 RX ORDER — SENNA PLUS 8.6 MG/1
2 TABLET ORAL AT BEDTIME
Qty: 0 | Refills: 0 | Status: DISCONTINUED | OUTPATIENT
Start: 2018-04-17 | End: 2018-04-19

## 2018-04-17 RX ORDER — CEFEPIME 1 G/1
INJECTION, POWDER, FOR SOLUTION INTRAMUSCULAR; INTRAVENOUS
Qty: 0 | Refills: 0 | Status: DISCONTINUED | OUTPATIENT
Start: 2018-04-17 | End: 2018-04-17

## 2018-04-17 RX ORDER — CEFEPIME 1 G/1
2000 INJECTION, POWDER, FOR SOLUTION INTRAMUSCULAR; INTRAVENOUS ONCE
Qty: 0 | Refills: 0 | Status: COMPLETED | OUTPATIENT
Start: 2018-04-17 | End: 2018-04-17

## 2018-04-17 RX ORDER — DOCUSATE SODIUM 100 MG
100 CAPSULE ORAL THREE TIMES A DAY
Qty: 0 | Refills: 0 | Status: DISCONTINUED | OUTPATIENT
Start: 2018-04-17 | End: 2018-04-19

## 2018-04-17 RX ORDER — HYDRALAZINE HCL 50 MG
5 TABLET ORAL ONCE
Qty: 0 | Refills: 0 | Status: COMPLETED | OUTPATIENT
Start: 2018-04-17 | End: 2018-04-17

## 2018-04-17 RX ORDER — HYDRALAZINE HCL 50 MG
10 TABLET ORAL EVERY 6 HOURS
Qty: 0 | Refills: 0 | Status: DISCONTINUED | OUTPATIENT
Start: 2018-04-17 | End: 2018-04-25

## 2018-04-17 RX ORDER — VANCOMYCIN HCL 1 G
1000 VIAL (EA) INTRAVENOUS EVERY 12 HOURS
Qty: 0 | Refills: 0 | Status: DISCONTINUED | OUTPATIENT
Start: 2018-04-18 | End: 2018-04-19

## 2018-04-17 RX ORDER — ENOXAPARIN SODIUM 100 MG/ML
40 INJECTION SUBCUTANEOUS EVERY 24 HOURS
Qty: 0 | Refills: 0 | Status: DISCONTINUED | OUTPATIENT
Start: 2018-04-17 | End: 2018-04-17

## 2018-04-17 RX ORDER — MORPHINE SULFATE 50 MG/1
6 CAPSULE, EXTENDED RELEASE ORAL ONCE
Qty: 0 | Refills: 0 | Status: DISCONTINUED | OUTPATIENT
Start: 2018-04-17 | End: 2018-04-17

## 2018-04-17 RX ORDER — CEFEPIME 1 G/1
2000 INJECTION, POWDER, FOR SOLUTION INTRAMUSCULAR; INTRAVENOUS EVERY 8 HOURS
Qty: 0 | Refills: 0 | Status: DISCONTINUED | OUTPATIENT
Start: 2018-04-18 | End: 2018-04-20

## 2018-04-17 RX ORDER — CEFEPIME 1 G/1
INJECTION, POWDER, FOR SOLUTION INTRAMUSCULAR; INTRAVENOUS
Qty: 0 | Refills: 0 | Status: DISCONTINUED | OUTPATIENT
Start: 2018-04-17 | End: 2018-04-20

## 2018-04-17 RX ORDER — LEVETIRACETAM 250 MG/1
1000 TABLET, FILM COATED ORAL EVERY 12 HOURS
Qty: 0 | Refills: 0 | Status: DISCONTINUED | OUTPATIENT
Start: 2018-04-17 | End: 2018-04-18

## 2018-04-17 RX ORDER — ONDANSETRON 8 MG/1
4 TABLET, FILM COATED ORAL EVERY 6 HOURS
Qty: 0 | Refills: 0 | Status: DISCONTINUED | OUTPATIENT
Start: 2018-04-17 | End: 2018-05-02

## 2018-04-17 RX ORDER — THIAMINE MONONITRATE (VIT B1) 100 MG
300 TABLET ORAL DAILY
Qty: 0 | Refills: 0 | Status: DISCONTINUED | OUTPATIENT
Start: 2018-04-17 | End: 2018-05-02

## 2018-04-17 RX ADMIN — MORPHINE SULFATE 6 MILLIGRAM(S): 50 CAPSULE, EXTENDED RELEASE ORAL at 18:07

## 2018-04-17 RX ADMIN — CEFEPIME 100 MILLIGRAM(S): 1 INJECTION, POWDER, FOR SOLUTION INTRAMUSCULAR; INTRAVENOUS at 18:18

## 2018-04-17 RX ADMIN — SODIUM CHLORIDE 1000 MILLILITER(S): 9 INJECTION, SOLUTION INTRAVENOUS at 15:19

## 2018-04-17 RX ADMIN — Medication 5 MILLIGRAM(S): at 22:49

## 2018-04-17 RX ADMIN — MORPHINE SULFATE 6 MILLIGRAM(S): 50 CAPSULE, EXTENDED RELEASE ORAL at 17:31

## 2018-04-17 RX ADMIN — ENOXAPARIN SODIUM 40 MILLIGRAM(S): 100 INJECTION SUBCUTANEOUS at 18:18

## 2018-04-17 RX ADMIN — Medication 650 MILLIGRAM(S): at 15:19

## 2018-04-17 RX ADMIN — SODIUM CHLORIDE 2000 MILLILITER(S): 9 INJECTION INTRAMUSCULAR; INTRAVENOUS; SUBCUTANEOUS at 20:25

## 2018-04-17 RX ADMIN — DEXTROSE MONOHYDRATE, SODIUM CHLORIDE, AND POTASSIUM CHLORIDE 75 MILLILITER(S): 50; .745; 4.5 INJECTION, SOLUTION INTRAVENOUS at 20:25

## 2018-04-17 NOTE — H&P ADULT - ASSESSMENT
Michelle Gould, 62 yo female POD 12 from right retrosig VS resection in ED transfer from [place] after she was reportedly found down and confused by mother.  Febrile, WC elevated, CSF suspicious for meningitis.    -repeat CTH in ER  -keppra 1g bid  -blood cx/esr/crp   -ID consult needs to be called  -MRI w wo when stable

## 2018-04-17 NOTE — H&P ADULT - HISTORY OF PRESENT ILLNESS
p ([id number])     HPI: Michelle Gould, 64 yo female POD 12 from right retrosig VS resection in ED transfer from [place] after she was reportedly found down and confused by mother who called 911.  Mother was not available at bedside at [facility] ED nor is she available via phone at this time. At [place], per report, CSF showed , , glucose 98, protein 240. Febrile to 100.6, tachy to 156, 98% RA, 158/88.  Reportedly seized at OSH at was given 2 ativan.    PAST MEDICAL HISTORY   Schwannoma    PAST SURGICAL HISTORY         MEDICATIONS:  Antibiotics:    Neuro:  morphine  - Injectable 6 milliGRAM(s) IV Push Once    Anticoagulation:    Other:      SOCIAL HISTORY:   Occupation:   Marital Status:     FAMILY HISTORY:      REVIEW OF SYSTEMS:  Check here if all are normal other than Neurological []  General:  Eyes:  ENT:  Cardiac:  Respiratory:  GI:  Musculoskeletal:   Skin:  Neurologic:   Psychiatric:     PHYSICAL EXAMINATION:   T(C): 37.1 (04-17-18 @ 15:51), Max: 38.6 (04-17-18 @ 14:25)  HR: 152 (04-17-18 @ 15:51) (152 - 156)  BP: 162/118 (04-17-18 @ 15:51) (158/88 - 162/118)  RR: 24 (04-17-18 @ 14:25) (24 - 24)  SpO2: 96% (04-17-18 @ 15:51) (96% - 98%)  Wt(kg): --    General Examination:     Neurologic Examination:             Higher functions                 Normal []               Abnormal:      Cranial Nerves (ii-xii)           Normal []              Abnormal:     Motor Exam                       Normal []              Abnormal:                               Sensory Exam                   Normal []              Abnormal:    Reflexes                            Normal []              Abnormal:     Coordination                      Normal []              Abnormal:    Other:     LABS:                        15.3   24.9  )-----------( 400      ( 17 Apr 2018 15:12 )             44.0     04-17    132<L>  |  93<L>  |  10  ----------------------------<  137<H>  6.9<HH>   |  22  |  0.74    Ca    8.9      17 Apr 2018 15:12    TPro  7.6  /  Alb  4.1  /  TBili  2.2<H>  /  DBili  x   /  AST  88<H>  /  ALT  61<H>  /  AlkPhos  69  04-17    PT/INR - ( 17 Apr 2018 15:12 )   PT: 11.0 sec;   INR: 1.02 ratio         PTT - ( 17 Apr 2018 15:12 )  PTT:24.6 sec      RADIOLOGY & ADDITIONAL STUDIES: p ([phone])     HPI: Michelle Gould, 64 yo female POD 12 from right retrosig VS resection in ED transfer from [place] after she was reportedly found down and confused by mother who called 911.  Mother was not available at bedside at [facility] ED nor is she available via phone at this time. At [place], per report, CSF showed , , glucose 98, protein 240. Febrile to 100.6, tachy to 156, 98% RA, 158/88.  Reportedly seized at OSH at was given 2 ativan.    PAST MEDICAL HISTORY   Schwannoma    PAST SURGICAL HISTORY         MEDICATIONS:  Antibiotics:    Neuro:  morphine  - Injectable 6 milliGRAM(s) IV Push Once    Anticoagulation:    Other:      SOCIAL HISTORY:   Occupation:   Marital Status:     FAMILY HISTORY:      REVIEW OF SYSTEMS:  Check here if all are normal other than Neurological [x]  General:  Eyes:  ENT:  Cardiac:  Respiratory:  GI:  Musculoskeletal:   Skin:  Neurologic:   Psychiatric:     PHYSICAL EXAMINATION:   T(C): 37.1 (04-17-18 @ 15:51), Max: 38.6 (04-17-18 @ 14:25)  HR: 152 (04-17-18 @ 15:51) (152 - 156)  BP: 162/118 (04-17-18 @ 15:51) (158/88 - 162/118)  RR: 24 (04-17-18 @ 14:25) (24 - 24)  SpO2: 96% (04-17-18 @ 15:51) (96% - 98%)  Wt(kg): --    General Examination:     Neurologic Examination:           Alert, nonverbal, LONG spontaneously, incision CDI, spontaneoulsy EO    LABS:                        15.3   24.9  )-----------( 400      ( 17 Apr 2018 15:12 )             44.0     04-17    132<L>  |  93<L>  |  10  ----------------------------<  137<H>  6.9<HH>   |  22  |  0.74    Ca    8.9      17 Apr 2018 15:12    TPro  7.6  /  Alb  4.1  /  TBili  2.2<H>  /  DBili  x   /  AST  88<H>  /  ALT  61<H>  /  AlkPhos  69  04-17    PT/INR - ( 17 Apr 2018 15:12 )   PT: 11.0 sec;   INR: 1.02 ratio         PTT - ( 17 Apr 2018 15:12 )  PTT:24.6 sec      RADIOLOGY & ADDITIONAL STUDIES:

## 2018-04-17 NOTE — ED PROVIDER NOTE - ENMT, MLM
Airway patent, Nasal mucosa clear. Mouth with normal mucosa.  L scalp with healing surgical scar, no signs superficial infection

## 2018-04-17 NOTE — ED ADULT NURSE REASSESSMENT NOTE - NS ED NURSE REASSESS COMMENT FT1
pt at baseline mental status, VSS, IV from right AC leaking and removed by RN, new IV placed in left AC by RN, awaiting CT scan

## 2018-04-17 NOTE — ED PROVIDER NOTE - MEDICAL DECISION MAKING DETAILS
Helen Mccurdy MD - Attending Physician: Pt here as transfer from [place]. Recent Schwannoma resection 4/5, here with fever, ams, seizure. Clinically c/w meningitis, s/p cultures, labs, LP, CT head. Received abx. Here will continue IVF, NSG c/s for admission

## 2018-04-17 NOTE — ED ADULT NURSE NOTE - OBJECTIVE STATEMENT
63 yr old female found BIBEMS transfer from [facility]. per transfer team she was found down by family unresponsive, bought to [facility] where she was found to be febrile, pt given vancomycin and zosyn, seized shorty after. pt given kepra and transferred to [facility]. upon assessment pt is non verbal, will make eye contact when name called, does not follow commands, pupils 2mm and reactive. lungs clear ade, abd non distend. man in place by [facility] draining yellow urine. ecchymosis noted over left wrist, left forearm, left ear and left cheek, errithema noted over sacrum, nonblanchable redness noted  coccyx area. no family present 63 yr old female found BIBEMS transfer from [facility]. per transfer team she was found down by family unresponsive, bought to [facility] where she was found to be febrile, pt given vancomycin and zosyn, seized shorty after. pt given kepra and transferred to [facility]. upon assessment pt is non verbal, will make eye contact when name called, does not follow commands, pupils 2mm and reactive. lungs clear ade, abd non distend. man in place by [facility] draining yellow urine. ecchymosis noted over left wrist, left forearm, left ear and left cheek, erythema noted over sacrum, nonblanchable redness noted  coccyx area. lateral incision with sutures noted behind right ear with ecchymosis at base from craniotomy.  no family present 63 yr old female found BIBEMS transfer from [facility]. per transfer team she was found down by family unresponsive, bought to [facility] where she was found to be febrile, pt given vancomycin and zosyn, seized shorty after. pt given kepra and transferred to [facility]. upon assessment pt is non verbal, will make eye contact when name called, does not follow commands, pupils 2mm and sluggish. lungs clear ade, abd non distend. man in place by [facility] draining yellow urine. ecchymosis noted over left wrist, left forearm, left ear and left cheek, erythema noted over sacrum, nonblanchable redness noted  coccyx area. lateral incision with sutures noted behind right ear with ecchymosis at base from craniotomy.  no family present

## 2018-04-17 NOTE — PROGRESS NOTE ADULT - ASSESSMENT
ASSESSMENT/PLAN:  64 yo F with recent schwannoma resection, presented with AMS - possible meningitis.  Seizures, reported as GTC, responded to Ativan.    NEURO:  -neurochecks q1hr  -avoid sedative meds  -NSx follows  -on empiric treatment of meningitis  -CTH with no acute changes  -continue Keppra 1 gr q12h  Activity: [x] mobilize as tolerated [] Bedrest [] PT [] OT [] PMNR    PULM: protects airways. Tolerates RA. Monitor.    CV:  MAP goal >65.    RENAL:  Fluids: IVF NS 75 ml/h; received 1 L LR.  Hyponatremia - unclear cause (SIADH vs hypovolemia/decreased PO intake) - monitor, send UA and U-electrolytes.    GI:  Transaminitis - hypotensive? Repeat in AM.  Diet: NPO for now in view of AMS.  GI prophylaxis [x] not indicated [] PPI [] other:  Bowel regimen [] colace [x] senna [] other:    ENDO:   Goal euglycemia (-180)    HEME/ONC:  VTE prophylaxis: [x] SCDs [x] chemoprophylaxis Lovenox 40 SQ [] hold chemoprophylaxis due to: [] high risk of DVT/PE on admission due to:  Macrocytosis likely due to EtOH use.  Leukocytosis - infection + sz.    ID:  GIve Cefepime 2 gr STAT (h/o recent NSx intervention, received Ceftriaxone only)  Continue Vanco 1 gr q12hr  Follow cultures - BCx, UCx and CSF Cx sent at [facility] prior to Abx (confirmed with their lab [phone])  Send lactate.    Misc:  reported h/o EtOH abuse - monitor for withdrawal    SOCIAL/FAMILY:  [x] awaiting [] updated at bedside [] family meeting    CODE STATUS:  [x] Full Code [] DNR [] DNI [] Palliative/Comfort Care    DISPOSITION:  [x] ICU [] Stroke Unit [] Floor [] EMU [] RCU [] PCU    [x] Patient is at high risk of neurologic deterioration/death due to: CNS infection, resp failure, shock.    Time spent: 45 critical care minutes    Contact: [phone] ASSESSMENT/PLAN:  62 yo F with recent schwannoma resection, presented with AMS - possible meningitis.  Seizures, reported as GTC, responded to Ativan.    NEURO:  -neurochecks q1hr  -avoid sedative meds  -NSx follows  -on empiric treatment of meningitis cefepime and vancomycin, follow csf cx  -CTH with no acute changes  - consider MRI brain wwwo once more stable   -continue Keppra 1 gr q12h  - will hook her to video EEG monitoring to r/o subclinical seizures   Activity: [x] mobilize as tolerated [] Bedrest [] PT [] OT [] PMNR    PULM: protects airways. Tolerates RA. Monitor.    CV:  MAP goal >65.    RENAL:  Fluids: IVF NS 75 ml/h; received 1 L LR, will give 1 LNS  bolus   Hyponatremia - unclear cause (SIADH vs hypovolemia/decreased PO intake/ salt wasting) - monitor, send UA and U-electrolytes.  If sodium doesnt increase with NS bolus, will start 2 %     GI:  Transaminitis - hypotensive? Repeat in AM.  Diet: NPO for now in view of AMS.  GI prophylaxis [x] not indicated [] PPI [] other:  Bowel regimen [] colace [x] senna [] other:    ENDO:   Goal euglycemia (-180)    HEME/ONC:  VTE prophylaxis: [x] SCDs [x] chemoprophylaxis Lovenox 40 SQ [] hold chemoprophylaxis due to: [] high risk of DVT/PE on admission due to:  Macrocytosis will get blood for vitamin B12, folate   Leukocytosis - infection + sz.    ID:  GIve Cefepime 2 gr STAT (h/o recent NSx intervention, received Ceftriaxone only)  Continue Vanco 1 gr q12hr  Follow cultures - BCx, UCx and CSF Cx sent at [facility] prior to Abx (confirmed with their lab [phone])  Send lactate.    Misc:   EtOH abuse? tried to call her son, he is not answering [phone]    SOCIAL/FAMILY:  [] awaiting [x] updated at bedside [] family meeting    CODE STATUS:  [x] Full Code [] DNR [] DNI [] Palliative/Comfort Care    DISPOSITION:  [x] ICU [] Stroke Unit [] Floor [] EMU [] RCU [] PCU    [x] Patient is at high risk of neurologic deterioration/death due to: CNS infection, resp failure, shock, status epilepticus     Time spent: 45 critical care minutes    Contact: [phone]

## 2018-04-17 NOTE — ED PROVIDER NOTE - OBJECTIVE STATEMENT
OSH Records:   Received Keppra, Ativan 2mg, Ceftriaxone 2g, Vanc 1g, 2L NS.   WBC 25.1  Hb 15.9  Hct 46.5   Plt 510  Na 134, K 3.6 Cl 90 CO2 19 BUN 10 Cr 0.9  Glu 294  Tprot 7.6, Alb 4.7, Tbili 2.9 AST 46  ALT 72 AP 96   Lact 8.9   Ammonia 118   Mg 1.4  CSF: Yellow, hazy,   , Glu 98, Prot 240  Ua neg. INR 0.9  Utox neg, ETOh neg, APAP neg, ASA neg  Xr chest neg  CT Head: post-craniectomy, no acute bleed  CT Cspine: No acute fracture Brought in from [facility]. Was found down by mother, EMS gave narcan, had first time seizure for which she received ativan and keppra. Was found to be febrile and had negative CT, LP showed elevated WBC, high protein, low glucose.  Patient was recently discharged for resection of a malignant schwannoma, has surgical site on R scalp, had post-op [place].  On arrival patient was GCS 9, uncooperative with exam wanting to lay on R side, protecting airway, nonverbal.  NSX consult called on arrival (operated by Dr. Vazquez)  OSH Records:   Received Keppra, Ativan 2mg, Ceftriaxone 2g, Vanc 1g, 2L NS.   WBC 25.1  Hb 15.9  Hct 46.5   Plt 510  Na 134, K 3.6 Cl 90 CO2 19 BUN 10 Cr 0.9  Glu 294  Tprot 7.6, Alb 4.7, Tbili 2.9 AST 46  ALT 72 AP 96   Lact 8.9   Ammonia 118   Mg 1.4  CSF: Yellow, hazy,   , Glu 98, Prot 240  Ua neg. INR 0.9  Utox neg, ETOh neg, APAP neg, ASA neg  Xr chest neg  CT Head: post-craniectomy, no acute bleed  CT Cspine: No acute fracture Brought in from [facility]. Was found down by mother, EMS gave narcan, had first time seizure for which she received ativan and keppra. Was found to be febrile and had negative CT, LP showed elevated WBC, high protein, low glucose.  Patient was recently discharged for resection of a malignant schwannoma, has surgical site on R scalp, had post-op [place].  On arrival patient was GCS 9, uncooperative with exam wanting to lay on R side, protecting airway, nonverbal.  NSX consult called on arrival (operated by Dr. Vazquez).  Brother in law Bg Arguelles: [phone], alerted that she may have been drinking vodka recently and has potentially had a drinking problem in the past.  OSH Records:   Received Keppra, Ativan 2mg, Ceftriaxone 2g, Vanc 1g, 2L NS.   WBC 25.1  Hb 15.9  Hct 46.5   Plt 510  Na 134, K 3.6 Cl 90 CO2 19 BUN 10 Cr 0.9  Glu 294  Tprot 7.6, Alb 4.7, Tbili 2.9 AST 46  ALT 72 AP 96   Lact 8.9   Ammonia 118   Mg 1.4  CSF: Yellow, hazy,   , Glu 98, Prot 240  Ua neg. INR 0.9  Utox neg, ETOh neg, APAP neg, ASA neg  Xr chest neg  CT Head: post-craniectomy, no acute bleed  CT Cspine: No acute fracture

## 2018-04-17 NOTE — CONSULT NOTE ADULT - ASSESSMENT
Michelle Gould, 62 yo female POD 12 from right retrosig VS resection in ED transfer from [place] after she was reportedly found down and confused by mother.  Febrile, WC elevated, CSF suspicious for meningitis.    -repeat CTH  -neurology for seizures  -blood cx/esr/crp   -ID consult  -Medicine consult

## 2018-04-17 NOTE — CONSULT NOTE ADULT - SUBJECTIVE AND OBJECTIVE BOX
p ([id number])     HPI: Michelle Gould, 62 yo female POD 12 from right retrosig VS resection in ED transfer from [place] after she was reportedly found down and confused by mother who called 911.  Mother was not available at bedside at [facility] ED nor is she available via phone at this time. At [place], per report, CSF showed , , glucose 98, protein 240. Febrile to 100.6, tachy to 156, 98% RA, 158/88.  Reportedly seized at OSH at was given 2 ativan.    PAST MEDICAL HISTORY     PAST SURGICAL HISTORY         MEDICATIONS:  Antibiotics:    Neuro:  acetaminophen  Suppository 650 milliGRAM(s) Rectal Once    Anticoagulation:    Other:  lactated ringers Bolus 1000 milliLiter(s) IV Bolus once      SOCIAL HISTORY:   Occupation:   Marital Status:     FAMILY HISTORY:      REVIEW OF SYSTEMS:  Check here if all are normal other than Neurological/HPI [x]  General:  Eyes:  ENT:  Cardiac:  Respiratory:  GI:  Musculoskeletal:   Skin:  Neurologic:   Psychiatric:     PHYSICAL EXAMINATION:   T(C): --  HR: 156 (04-17-18 @ 14:25) (156 - 156)  BP: 158/88 (04-17-18 @ 14:25) (158/88 - 158/88)  RR: 24 (04-17-18 @ 14:25) (24 - 24)  SpO2: 98% (04-17-18 @ 14:25) (98% - 98%)  Wt(kg): --    General Examination:     Neurologic Examination:           EO spontaneously, LONG, nonverbal, not FC, alert    LABS:                RADIOLOGY & ADDITIONAL STUDIES:

## 2018-04-17 NOTE — ED PROVIDER NOTE - CARE PLAN
Principal Discharge DX:	Meningitis after procedure  Secondary Diagnosis:	Altered mental status, unspecified altered mental status type  Secondary Diagnosis:	Seizure

## 2018-04-17 NOTE — ED PROVIDER NOTE - ATTENDING CONTRIBUTION TO CARE
Helen Mccurdy MD - Attending Physician: I have personally seen and examined this patient with the resident/fellow.  I have fully participated in the care of this patient. I have reviewed all pertinent clinical information, including history, physical exam, plan and the Resident/Fellow’s note and agree except as noted. See MDM

## 2018-04-17 NOTE — PROGRESS NOTE ADULT - SUBJECTIVE AND OBJECTIVE BOX
HPI:   Michelle Gould, (has different prior MRN [id number],  1955). Transferred from [facility] as Edilma Richardson ( 1955).    62 yo female POD 12 from right retrosigmoid VS resection. Admitted after she was reportedly found down and confused - by mother who called 911. No family members at the bedside, history obtained from med records and ED staff. At [place], per report, CSF showed , , glucose 98, protein 240. Febrile to 100.6, tachy to 156, 98% RA, 158/88.  Reportedly seized x3 at OSH at was given Ativan.  Also received Vanco 1 gr + Ceftriaxone 2 gr IV once at [facility].   CSF and BCx, and UCx sent at [facility], confirmed with their lab - tel. [phone]  On arrival to [facility] patient was GCS 9.    REVIEW OF SYSTEMS:  cannot obtain due to the medical condition of the patient.    Vitals:  T(C): 37.1 (18 @ 15:51), Max: 38.6 (18 @ 14:25)  HR: 152 (18 @ 15:51) (152 - 156)  BP: 162/118 (18 @ 15:51) (158/88 - 162/118)  RR: 24 (18 @ 14:25) (24 - 24)  SpO2: 96% (18 @ 15:51) (96% - 98%)      LABS:                       15.3   24.9  )-----------( 400      ( 2018 15:12 )             44.0         132<L>  |  93<L>  |  10  ----------------------------<  137<H>  6.9<HH>   |  22  |  0.74    Ca    8.9      2018 15:12    TPro  7.6  /  Alb  4.1  /  TBili  2.2<H>  /  DBili  x   /  AST  88<H>  /  ALT  61<H>  /  AlkPhos  69      PT/INR - ( 2018 15:12 )   PT: 11.0 sec;   INR: 1.02 ratio         PTT - ( 2018 15:12 )  PTT:24.6 sec      RADIOLOGY & ADDITIONAL STUDIES: (2018 16:35)    VITALS:  T(C): , Max: 38.6 (18 @ 14:25)  HR:  (152 - 156)  BP:  (158/88 - 162/118)  RR:  (24 - 24)  SpO2:  (96% - 98%)      LABS:  Na: 134 ( @ 16:36), 132 ( @ 15:12)  K: 3.5 ( @ 16:36), 6.9 ( @ 15:12)  Cl: 95 ( @ 16:36), 93 ( @ 15:12)  CO2: 24 ( @ 16:36), 22 ( @ 15:12)  BUN: 10 ( @ 16:36), 10 ( @ 15:12)  Cr: 0.83 ( @ 16:36), 0.74 ( @ 15:12)  Glu: 140( @ 16:36), 137( @ 15:12)    Hgb: 15.3 ( @ 15:12)  Hct: 44.0 ( @ 15:12)  WBC: 24.9 ( @ 15:12)  Plt: 400 ( @ 15:12)  PT: 11.0 ( @ 15:12)  INR: 1.02 ( @ 15:12)  aPTT: 24.6 ( @ 15:12)    IMAGING:   Recent imaging studies were reviewed.    MEDICATIONS:  VAnco 1 gr q12h  Cefepime 2 gr q8hr  docusate sodium 100 milliGRAM(s) Oral three times a day  enoxaparin Injectable 40 milliGRAM(s) SubCutaneous every 24 hours  levETIRAcetam 1000 milliGRAM(s) Oral every 12 hours  ondansetron   Disintegrating Tablet 4 milliGRAM(s) Oral every 6 hours PRN  senna 2 Tablet(s) Oral at bedtime PRN  sodium chloride 0.9% with potassium chloride 20 mEq/L 1000 milliLiter(s) IV Continuous <Continuous>    EXAMINATION:  Neurologic Examination: Alert, nonverbal, EO, not FC, LONG spontaneously.  Chest CTAB.  S1S2 present. Tachycardia. HPI:   Michelle Gould, (has different prior MRN [id number],  1955). Transferred from [facility] as Edilma Richardson ( 1955).    62 yo female POD 12 from right retrosigmoid VS resection. Admitted after she was reportedly found down and confused - by mother who called 911. No family members at the bedside, history obtained from med records and ED staff. At [place], per report, CSF showed , , glucose 98, protein 240. Febrile to 100.6, tachy to 156, 98% RA, 158/88.  Reportedly seized x3 at OSH at was given Ativan.  Also received Vanco 1 gr + Ceftriaxone 2 gr IV once at [facility].   CSF and BCx, and UCx sent at [facility], confirmed with their lab - tel. [phone]  On arrival to [facility] patient was GCS 9.    REVIEW OF SYSTEMS:  cannot obtain due to the medical condition of the patient.    Vitals:  T(C): 37.1 (18 @ 15:51), Max: 38.6 (18 @ 14:25)  HR: 152 (18 @ 15:51) (152 - 156)  BP: 162/118 (18 @ 15:51) (158/88 - 162/118)  RR: 24 (18 @ 14:25) (24 - 24)  SpO2: 96% (18 @ 15:51) (96% - 98%)      LABS:                       15.3   24.9  )-----------( 400      ( 2018 15:12 )             44.0         132<L>  |  93<L>  |  10  ----------------------------<  137<H>  6.9<HH>   |  22  |  0.74    Ca    8.9      2018 15:12    TPro  7.6  /  Alb  4.1  /  TBili  2.2<H>  /  DBili  x   /  AST  88<H>  /  ALT  61<H>  /  AlkPhos  69      PT/INR - ( 2018 15:12 )   PT: 11.0 sec;   INR: 1.02 ratio         PTT - ( 2018 15:12 )  PTT:24.6 sec      RADIOLOGY & ADDITIONAL STUDIES: (2018 16:35)    VITALS:  T(C): , Max: 38.6 (18 @ 14:25)  HR:  (152 - 156)  BP:  (158/88 - 162/118)  RR:  (24 - 24)  SpO2:  (96% - 98%)      LABS:  Na: 134 ( @ 16:36), 132 ( @ 15:12)  K: 3.5 ( @ 16:36), 6.9 ( @ 15:12)  Cl: 95 ( @ 16:36), 93 ( @ 15:12)  CO2: 24 ( @ 16:36), 22 ( @ 15:12)  BUN: 10 ( @ 16:36), 10 ( @ 15:12)  Cr: 0.83 ( @ 16:36), 0.74 ( @ 15:12)  Glu: 140( @ 16:36), 137( @ 15:12)    Hgb: 15.3 ( @ 15:12)  Hct: 44.0 ( @ 15:12)  WBC: 24.9 ( @ 15:12)  Plt: 400 ( @ 15:12)  PT: 11.0 ( @ 15:12)  INR: 1.02 ( @ 15:12)  aPTT: 24.6 ( @ 15:12)    IMAGING:   Recent imaging studies were reviewed.    MEDICATIONS:  VAnco 1 gr q12h  Cefepime 2 gr q8hr  docusate sodium 100 milliGRAM(s) Oral three times a day  enoxaparin Injectable 40 milliGRAM(s) SubCutaneous every 24 hours  levETIRAcetam 1000 milliGRAM(s) Oral every 12 hours  ondansetron   Disintegrating Tablet 4 milliGRAM(s) Oral every 6 hours PRN  senna 2 Tablet(s) Oral at bedtime PRN  sodium chloride 0.9% with potassium chloride 20 mEq/L 1000 milliLiter(s) IV Continuous <Continuous>    EXAMINATION:  Neurologic Examination: Alert, nonverbal, ?right CN6 palsy and Right facial droop, not FC, LONG spontaneously at least antigravity   Chest CTAB.  S1S2 present. Tachycardia.  lungs good breath sounds bilaterally   Abd: soft nontender  ext: no edema

## 2018-04-18 LAB
ALBUMIN SERPL ELPH-MCNC: 3.4 G/DL — SIGNIFICANT CHANGE UP (ref 3.3–5)
ALP SERPL-CCNC: 66 U/L — SIGNIFICANT CHANGE UP (ref 40–120)
ALT FLD-CCNC: 44 U/L — SIGNIFICANT CHANGE UP (ref 10–45)
ANION GAP SERPL CALC-SCNC: 11 MMOL/L — SIGNIFICANT CHANGE UP (ref 5–17)
ANION GAP SERPL CALC-SCNC: 12 MMOL/L — SIGNIFICANT CHANGE UP (ref 5–17)
APPEARANCE UR: ABNORMAL
AST SERPL-CCNC: 32 U/L — SIGNIFICANT CHANGE UP (ref 10–40)
BILIRUB DIRECT SERPL-MCNC: 0.4 MG/DL — HIGH (ref 0–0.2)
BILIRUB INDIRECT FLD-MCNC: 1.7 MG/DL — HIGH (ref 0.2–1)
BILIRUB SERPL-MCNC: 2.1 MG/DL — HIGH (ref 0.2–1.2)
BILIRUB UR-MCNC: NEGATIVE — SIGNIFICANT CHANGE UP
BUN SERPL-MCNC: 10 MG/DL — SIGNIFICANT CHANGE UP (ref 7–23)
BUN SERPL-MCNC: 11 MG/DL — SIGNIFICANT CHANGE UP (ref 7–23)
CALCIUM SERPL-MCNC: 8.6 MG/DL — SIGNIFICANT CHANGE UP (ref 8.4–10.5)
CALCIUM SERPL-MCNC: 8.8 MG/DL — SIGNIFICANT CHANGE UP (ref 8.4–10.5)
CHLORIDE SERPL-SCNC: 100 MMOL/L — SIGNIFICANT CHANGE UP (ref 96–108)
CHLORIDE SERPL-SCNC: 99 MMOL/L — SIGNIFICANT CHANGE UP (ref 96–108)
CHLORIDE UR-SCNC: 153 MMOL/L — SIGNIFICANT CHANGE UP
CK MB CFR SERPL CALC: 2.5 NG/ML — SIGNIFICANT CHANGE UP (ref 0–3.8)
CK SERPL-CCNC: 99 U/L — SIGNIFICANT CHANGE UP (ref 25–170)
CO2 SERPL-SCNC: 24 MMOL/L — SIGNIFICANT CHANGE UP (ref 22–31)
CO2 SERPL-SCNC: 24 MMOL/L — SIGNIFICANT CHANGE UP (ref 22–31)
COLOR SPEC: YELLOW — SIGNIFICANT CHANGE UP
CREAT SERPL-MCNC: 0.78 MG/DL — SIGNIFICANT CHANGE UP (ref 0.5–1.3)
CREAT SERPL-MCNC: 0.86 MG/DL — SIGNIFICANT CHANGE UP (ref 0.5–1.3)
CRP SERPL-MCNC: 5.7 MG/DL — HIGH (ref 0–0.4)
DIFF PNL FLD: ABNORMAL
GLUCOSE SERPL-MCNC: 126 MG/DL — HIGH (ref 70–99)
GLUCOSE SERPL-MCNC: 99 MG/DL — SIGNIFICANT CHANGE UP (ref 70–99)
GLUCOSE UR QL: NEGATIVE — SIGNIFICANT CHANGE UP
HCT VFR BLD CALC: 39.2 % — SIGNIFICANT CHANGE UP (ref 34.5–45)
HGB BLD-MCNC: 13.4 G/DL — SIGNIFICANT CHANGE UP (ref 11.5–15.5)
KETONES UR-MCNC: ABNORMAL
LACTATE SERPL-SCNC: 1.4 MMOL/L — SIGNIFICANT CHANGE UP (ref 0.7–2)
LEUKOCYTE ESTERASE UR-ACNC: NEGATIVE — SIGNIFICANT CHANGE UP
MAGNESIUM SERPL-MCNC: 1.1 MG/DL — LOW (ref 1.6–2.6)
MAGNESIUM SERPL-MCNC: 1.6 MG/DL — SIGNIFICANT CHANGE UP (ref 1.6–2.6)
MCHC RBC-ENTMCNC: 34.2 GM/DL — SIGNIFICANT CHANGE UP (ref 32–36)
MCHC RBC-ENTMCNC: 35.6 PG — HIGH (ref 27–34)
MCV RBC AUTO: 104 FL — HIGH (ref 80–100)
NITRITE UR-MCNC: NEGATIVE — SIGNIFICANT CHANGE UP
OSMOLALITY UR: 472 MOS/KG — SIGNIFICANT CHANGE UP (ref 300–900)
PH UR: 6.5 — SIGNIFICANT CHANGE UP (ref 5–8)
PHOSPHATE SERPL-MCNC: 2 MG/DL — LOW (ref 2.5–4.5)
PHOSPHATE SERPL-MCNC: 2.6 MG/DL — SIGNIFICANT CHANGE UP (ref 2.5–4.5)
PLATELET # BLD AUTO: 317 K/UL — SIGNIFICANT CHANGE UP (ref 150–400)
POTASSIUM SERPL-MCNC: 3.6 MMOL/L — SIGNIFICANT CHANGE UP (ref 3.5–5.3)
POTASSIUM SERPL-MCNC: 3.7 MMOL/L — SIGNIFICANT CHANGE UP (ref 3.5–5.3)
POTASSIUM SERPL-SCNC: 3.6 MMOL/L — SIGNIFICANT CHANGE UP (ref 3.5–5.3)
POTASSIUM SERPL-SCNC: 3.7 MMOL/L — SIGNIFICANT CHANGE UP (ref 3.5–5.3)
PROT SERPL-MCNC: 6 G/DL — SIGNIFICANT CHANGE UP (ref 6–8.3)
PROT UR-MCNC: 30 MG/DL
RBC # BLD: 3.77 M/UL — LOW (ref 3.8–5.2)
RBC # FLD: 12.1 % — SIGNIFICANT CHANGE UP (ref 10.3–14.5)
SODIUM SERPL-SCNC: 135 MMOL/L — SIGNIFICANT CHANGE UP (ref 135–145)
SODIUM SERPL-SCNC: 135 MMOL/L — SIGNIFICANT CHANGE UP (ref 135–145)
SODIUM UR-SCNC: 169 MMOL/L — SIGNIFICANT CHANGE UP
SP GR SPEC: 1.01 — SIGNIFICANT CHANGE UP (ref 1.01–1.02)
TROPONIN T SERPL-MCNC: <0.01 NG/ML — SIGNIFICANT CHANGE UP (ref 0–0.06)
UROBILINOGEN FLD QL: NEGATIVE — SIGNIFICANT CHANGE UP
WBC # BLD: 16.8 K/UL — HIGH (ref 3.8–10.5)
WBC # FLD AUTO: 16.8 K/UL — HIGH (ref 3.8–10.5)

## 2018-04-18 PROCEDURE — 95951: CPT | Mod: 26,52

## 2018-04-18 PROCEDURE — 99291 CRITICAL CARE FIRST HOUR: CPT

## 2018-04-18 RX ORDER — MORPHINE SULFATE 50 MG/1
2 CAPSULE, EXTENDED RELEASE ORAL ONCE
Qty: 0 | Refills: 0 | Status: DISCONTINUED | OUTPATIENT
Start: 2018-04-18 | End: 2018-04-18

## 2018-04-18 RX ORDER — MAGNESIUM SULFATE 500 MG/ML
2 VIAL (ML) INJECTION ONCE
Qty: 0 | Refills: 0 | Status: COMPLETED | OUTPATIENT
Start: 2018-04-18 | End: 2018-04-18

## 2018-04-18 RX ORDER — LEVETIRACETAM 250 MG/1
1000 TABLET, FILM COATED ORAL
Qty: 0 | Refills: 0 | Status: DISCONTINUED | OUTPATIENT
Start: 2018-04-18 | End: 2018-05-02

## 2018-04-18 RX ORDER — METOPROLOL TARTRATE 50 MG
25 TABLET ORAL
Qty: 0 | Refills: 0 | Status: DISCONTINUED | OUTPATIENT
Start: 2018-04-18 | End: 2018-04-23

## 2018-04-18 RX ORDER — ACETAMINOPHEN 500 MG
1000 TABLET ORAL ONCE
Qty: 0 | Refills: 0 | Status: COMPLETED | OUTPATIENT
Start: 2018-04-18 | End: 2018-04-18

## 2018-04-18 RX ORDER — SODIUM CHLORIDE 9 MG/ML
250 INJECTION INTRAMUSCULAR; INTRAVENOUS; SUBCUTANEOUS ONCE
Qty: 0 | Refills: 0 | Status: COMPLETED | OUTPATIENT
Start: 2018-04-18 | End: 2018-04-18

## 2018-04-18 RX ORDER — LEVETIRACETAM 250 MG/1
1000 TABLET, FILM COATED ORAL EVERY 12 HOURS
Qty: 0 | Refills: 0 | Status: DISCONTINUED | OUTPATIENT
Start: 2018-04-18 | End: 2018-04-18

## 2018-04-18 RX ORDER — METOPROLOL TARTRATE 50 MG
5 TABLET ORAL ONCE
Qty: 0 | Refills: 0 | Status: COMPLETED | OUTPATIENT
Start: 2018-04-18 | End: 2018-04-18

## 2018-04-18 RX ORDER — POTASSIUM PHOSPHATE, MONOBASIC POTASSIUM PHOSPHATE, DIBASIC 236; 224 MG/ML; MG/ML
15 INJECTION, SOLUTION INTRAVENOUS ONCE
Qty: 0 | Refills: 0 | Status: COMPLETED | OUTPATIENT
Start: 2018-04-18 | End: 2018-04-18

## 2018-04-18 RX ORDER — SODIUM CHLORIDE 9 MG/ML
500 INJECTION INTRAMUSCULAR; INTRAVENOUS; SUBCUTANEOUS ONCE
Qty: 0 | Refills: 0 | Status: COMPLETED | OUTPATIENT
Start: 2018-04-18 | End: 2018-04-18

## 2018-04-18 RX ORDER — POTASSIUM CHLORIDE 20 MEQ
10 PACKET (EA) ORAL
Qty: 0 | Refills: 0 | Status: COMPLETED | OUTPATIENT
Start: 2018-04-18 | End: 2018-04-18

## 2018-04-18 RX ADMIN — SODIUM CHLORIDE 1000 MILLILITER(S): 9 INJECTION INTRAMUSCULAR; INTRAVENOUS; SUBCUTANEOUS at 11:49

## 2018-04-18 RX ADMIN — Medication 50 GRAM(S): at 21:17

## 2018-04-18 RX ADMIN — SODIUM CHLORIDE 500 MILLILITER(S): 9 INJECTION INTRAMUSCULAR; INTRAVENOUS; SUBCUTANEOUS at 05:00

## 2018-04-18 RX ADMIN — Medication 100 MILLIEQUIVALENT(S): at 01:39

## 2018-04-18 RX ADMIN — Medication 250 MILLIGRAM(S): at 17:34

## 2018-04-18 RX ADMIN — LEVETIRACETAM 400 MILLIGRAM(S): 250 TABLET, FILM COATED ORAL at 05:31

## 2018-04-18 RX ADMIN — Medication 1000 MILLIGRAM(S): at 03:15

## 2018-04-18 RX ADMIN — POTASSIUM PHOSPHATE, MONOBASIC POTASSIUM PHOSPHATE, DIBASIC 62.5 MILLIMOLE(S): 236; 224 INJECTION, SOLUTION INTRAVENOUS at 21:17

## 2018-04-18 RX ADMIN — Medication 100 MILLIGRAM(S): at 13:23

## 2018-04-18 RX ADMIN — Medication 400 MILLIGRAM(S): at 03:00

## 2018-04-18 RX ADMIN — MORPHINE SULFATE 2 MILLIGRAM(S): 50 CAPSULE, EXTENDED RELEASE ORAL at 11:49

## 2018-04-18 RX ADMIN — ENOXAPARIN SODIUM 40 MILLIGRAM(S): 100 INJECTION SUBCUTANEOUS at 17:34

## 2018-04-18 RX ADMIN — Medication 10 MILLIGRAM(S): at 02:06

## 2018-04-18 RX ADMIN — MORPHINE SULFATE 2 MILLIGRAM(S): 50 CAPSULE, EXTENDED RELEASE ORAL at 12:20

## 2018-04-18 RX ADMIN — CEFEPIME 100 MILLIGRAM(S): 1 INJECTION, POWDER, FOR SOLUTION INTRAMUSCULAR; INTRAVENOUS at 21:17

## 2018-04-18 RX ADMIN — NYSTATIN CREAM 1 APPLICATION(S): 100000 CREAM TOPICAL at 05:31

## 2018-04-18 RX ADMIN — Medication 25 MILLIGRAM(S): at 17:34

## 2018-04-18 RX ADMIN — LEVETIRACETAM 1000 MILLIGRAM(S): 250 TABLET, FILM COATED ORAL at 17:52

## 2018-04-18 RX ADMIN — NYSTATIN CREAM 1 APPLICATION(S): 100000 CREAM TOPICAL at 17:34

## 2018-04-18 RX ADMIN — Medication 50 GRAM(S): at 03:45

## 2018-04-18 RX ADMIN — Medication 100 MILLIEQUIVALENT(S): at 02:29

## 2018-04-18 RX ADMIN — Medication 5 MILLIGRAM(S): at 04:35

## 2018-04-18 RX ADMIN — Medication 250 MILLIGRAM(S): at 05:31

## 2018-04-18 RX ADMIN — CEFEPIME 100 MILLIGRAM(S): 1 INJECTION, POWDER, FOR SOLUTION INTRAMUSCULAR; INTRAVENOUS at 02:31

## 2018-04-18 RX ADMIN — CEFEPIME 100 MILLIGRAM(S): 1 INJECTION, POWDER, FOR SOLUTION INTRAMUSCULAR; INTRAVENOUS at 13:23

## 2018-04-18 RX ADMIN — Medication 300 MILLIGRAM(S): at 13:28

## 2018-04-18 NOTE — CONSULT NOTE ADULT - SUBJECTIVE AND OBJECTIVE BOX
HPI:   Patient is a 63y female transferred from outside hospital as ayaan Lundy MR#[id number],  55,  hx crani for resection of vestibular schwanomma, POD#12, now transferred from [facility], where she was found unresponsive, confused, started on empiric Vanco and Ceftriaxone.  Cultures obtained at [facility] lab [phone] and pending. At NS patient abx continued and changed to Vanco and Cefepime, she had low grade fever and leukocytosis. Pt is confused, but follows commands, no specific c/o. Pt currently on EEG    REVIEW OF SYSTEMS:  All other review of systems negative (Comprehensive ROS)    PAST MEDICAL & SURGICAL HISTORY:  Schwannoma      Allergies    No Known Allergies    Intolerances        Antimicrobials Day #    cefepime  IVPB      cefepime  IVPB 2000 milliGRAM(s) IV Intermittent every 8 hours  vancomycin  IVPB 1000 milliGRAM(s) IV Intermittent every 12 hours    Other Medications:  docusate sodium 100 milliGRAM(s) Oral three times a day  enoxaparin Injectable 40 milliGRAM(s) SubCutaneous <User Schedule>  hydrALAZINE Injectable 10 milliGRAM(s) IV Push every 6 hours PRN  levETIRAcetam 1000 milliGRAM(s) Oral two times a day  nystatin Cream 1 Application(s) Topical two times a day  ondansetron   Disintegrating Tablet 4 milliGRAM(s) Oral every 6 hours PRN  senna 2 Tablet(s) Oral at bedtime PRN  thiamine 300 milliGRAM(s) Oral daily      FAMILY HISTORY:      SOCIAL HISTORY:  Smoking:     ETOH:     Drug Use:     Single     T(F): 98.9 (18 @ 07:00), Max: 101.5 (18 @ 14:25)  HR: 86 (18 @ 11:00)  BP: 126/79 (18 @ 11:00)  RR: 17 (18 @ 11:00)  SpO2: 98% (18 @ 11:00)  Wt(kg): --    PHYSICAL EXAM:  General: alert, no acute distress  Eyes:  anicteric, no conjunctival injection, no discharge  Oropharynx: no lesions or injection 	  Neck: supple, without adenopathy  scalp dressed, EEG in progress  Lungs: clear to auscultation  Heart: regular rate and rhythm; no murmur, rubs or gallops  Abdomen: soft, nondistended, nontender, without mass or organomegaly  Skin: no lesions  Extremities: no clubbing, cyanosis, or edema  Neurologic: alert, follows simple commands    LAB RESULTS:                        13.4   16.8  )-----------( 317      ( 2018 23:29 )             39.2     -    135  |  99  |  10  ----------------------------<  99  3.6   |  24  |  0.78    Ca    8.6      2018 23:29  Phos  2.6       Mg     1.1         TPro  6.0  /  Alb  3.4  /  TBili  2.1<H>  /  DBili  0.4<H>  /  AST  32  /  ALT  44  /  AlkPhos  66      LIVER FUNCTIONS - ( 2018 23:29 )  Alb: 3.4 g/dL / Pro: 6.0 g/dL / ALK PHOS: 66 U/L / ALT: 44 U/L / AST: 32 U/L / GGT: x           Urinalysis Basic - ( 2018 23:27 )    Color: Yellow / Appearance: SL Turbid / S.013 / pH: x  Gluc: x / Ketone: Trace  / Bili: Negative / Urobili: Negative   Blood: x / Protein: 30 mg/dL / Nitrite: Negative   Leuk Esterase: Negative / RBC: 10-25 /HPF / WBC 3-5 /HPF   Sq Epi: x / Non Sq Epi: OCC /HPF / Bacteria: x        MICROBIOLOGY REVIEWED:    RADIOLOGY REVIEWED:

## 2018-04-18 NOTE — PROGRESS NOTE ADULT - SUBJECTIVE AND OBJECTIVE BOX
Patient seen and examined at bedside.    T(C): 37.3 (04-18-18 @ 03:00), Max: 38.6 (04-17-18 @ 14:25)  HR: 100 (04-18-18 @ 03:00) (75 - 156)  BP: 147/98 (04-18-18 @ 03:00) (147/81 - 176/92)  RR: 22 (04-18-18 @ 03:00) (11 - 34)  SpO2: 99% (04-18-18 @ 03:00) (95% - 100%)  Wt(kg): --    EXAM:    AOx2, Following Commands  CN: PERRL, EOMI, HB3 facial palsy on right  Motor: 5/5 throughout, no drift  Sensation intact to light touch  Reflexes: no clonus   Kernig Brudzinski negative

## 2018-04-18 NOTE — PROGRESS NOTE ADULT - ASSESSMENT
63F s/p right retrosigmoid crani for resection of vestibular schwanomma p/w confusion and transferred to [facility] for concern for meningitis    -f/u CSF results  -Empiric Abx vanc cefepime per ID  -ID follow up  -VEEG in progress for suspected seizure at OSH  -neurochecks q1h  _pain control

## 2018-04-18 NOTE — PROGRESS NOTE ADULT - ASSESSMENT
ASSESSMENT/PLAN:  62 yo F with recent schwannoma resection, presented with AMS - possible meningitis.  Seizures, reported as GTC, responded to Ativan.    NEURO:  -neurochecks q1hr  -avoid sedative meds  -NSx follows  -on empiric treatment of meningitis cefepime and vancomycin, follow csf cx  -CTH with no acute changes, consider MRI brain wwwo once more stable   -continue Keppra 1 gr q12h  - pending vEEG report  Activity: [x] mobilize as tolerated [] Bedrest [] PT [] OT [] PMNR    PULM: protects airways. Tolerates RA.     CV:  MAP goal >65.    RENAL:  Fluids: IVF NS 75 ml/h  Hyponatremia, resolved with IVF, likely hypovolemia/decreased PO intake, possible CSW- monitor    GI:  Transaminitis - resolved, likely hypotensive  Indirect hyperbilirubinemia, H/H stable  Diet: advance as tolerated  GI prophylaxis [x] not indicated [] PPI [] other:  Bowel regimen [] colace [x] senna [] other:    ENDO:   Goal euglycemia (-180)    HEME/ONC:  VTE prophylaxis: [x] SCDs [x] chemoprophylaxis Lovenox 40 SQ [] hold chemoprophylaxis due to: [] high risk of DVT/PE on admission due to:  Macrocytosis - follow blood for vitamin B12, folate   Leukocytosis - improving    ID:  Continue Vanco 1 gr q12hr + Cefepime 2gr q8hr  Follow cultures - BCx, UCx and CSF Cx sent at [facility] prior to Abx (confirmed with their lab [phone])  LActate WNL after IVf    Misc:  EtOH abuse? (as per EMR, reported by a family member)     SOCIAL/FAMILY:  [] awaiting [x] updated at bedside [] family meeting    CODE STATUS:  [x] Full Code [] DNR [] DNI [] Palliative/Comfort Care    DISPOSITION:  [x] ICU [] Stroke Unit [] Floor [] EMU [] RCU [] PCU    [x] Patient is at high risk of neurologic deterioration/death due to: CNS infection, resp failure, shock, status epilepticus     Time spent: 45 critical care minutes    Contact: [phone] ASSESSMENT/PLAN:  62 yo F with recent schwannoma resection, presented with AMS - possible meningitis.  Seizures, reported as GTC, responded to Ativan.    NEURO:  -neurochecks q1hr  -avoid sedative meds  -NSx follows  -on empiric treatment of meningitis cefepime and vancomycin, follow csf cx  -CTH with no acute changes, consider MRI brain wwwo once more stable   -continue Keppra 1 gr q12h  - pending vEEG report  Activity: [x] mobilize as tolerated [] Bedrest [X] PT [X] OT [] PMNR    PULM: protects airways. Tolerates RA.     CV:  MAP goal >65.    RENAL:  Fluids: IVF- IVL   Hyponatremia, resolved with IVF, likely hypovolemia/decreased PO intake, possible CSW- monitor    GI:  Transaminitis - resolved, likely hypotensive  Indirect hyperbilirubinemia, H/H stable  Diet: Regular diet   GI prophylaxis [x] not indicated [] PPI [] other:  Bowel regimen [] colace [x] senna [] other:    ENDO:   Goal euglycemia (-180)    HEME/ONC:  VTE prophylaxis: [x] SCDs [x] chemoprophylaxis Lovenox 40 SQ   Macrocytosis - follow blood for vitamin B12, folate- ? Hx of ETOH - will give thiamine ; folate MVI   Leukocytosis - improving    ID:  Continue Vanco 1 gr q12hr + Cefepime 2gr q8hr  Follow cultures - BCx, UCx and CSF Cx sent at [facility] prior to Abx (confirmed with their lab [phone])  LActate WNL after IVf    Misc:  EtOH abuse? (as per EMR, reported by a family member)     SOCIAL/FAMILY:  [] awaiting [x] updated at bedside [] family meeting    CODE STATUS:  [x] Full Code [] DNR [] DNI [] Palliative/Comfort Care    DISPOSITION:  [x] ICU [] Stroke Unit [] Floor [] EMU [] RCU [] PCU    [x] Patient is at high risk of neurologic deterioration/death due to: CNS infection, resp failure, shock, status epilepticus     Time spent: 40 critical care minutes    Contact: [phone]

## 2018-04-18 NOTE — EEG REPORT - NS EEG TEXT BOX
MILI BAUMANN [id number]     Study Date: 		04-17-18    ROUTINE EEG    Technical Information:			  		  Placement and Labeling of Electrodes:  The EEG was performed utilizing 20 channels referential EEG connections (coronal over temporal over parasagittal montage) using all standard 10-20 electrode placements with EKG.  Recording was at a sampling rate of 256 samples per second per channel.  Time synchronized digital video recording was done simultaneously with EEG recording.  A low light infrared camera was used for low light recording.  Sunny and seizure detection algorithms were utilized.    CSA Technical Component:  Quantitative EEG analysis using a separate Compressed Spectral Array (CSA) software package was conducted in real-time and run at bedside after set up by the technician, digitally displaying the power of electrographic frequencies included in the 1-30Hz band using a graded color map.  This data was reviewed and interpreted independently, and is reported in a separate section below.    --------------------------------------------------------------------------------------------------  History:  CC/ HPI Patient is a 63y old  Female who presents with a chief complaint of AMS (17 Apr 2018 16:35)    MEDICATIONS  (STANDING):  cefepime  IVPB      cefepime  IVPB 2000 milliGRAM(s) IV Intermittent every 8 hours  docusate sodium 100 milliGRAM(s) Oral three times a day  enoxaparin Injectable 40 milliGRAM(s) SubCutaneous <User Schedule>  levETIRAcetam  IVPB 1000 milliGRAM(s) IV Intermittent every 12 hours  nystatin Cream 1 Application(s) Topical two times a day  sodium chloride 0.9% with potassium chloride 20 mEq/L 1000 milliLiter(s) (75 mL/Hr) IV Continuous <Continuous>  thiamine 300 milliGRAM(s) Oral daily  vancomycin  IVPB 1000 milliGRAM(s) IV Intermittent every 12 hours    --------------------------------------------------------------------------------------------------  Study Interpretation:    FINDINGS:  The background was continuous, spontaneously variable and reactive.  During wakefulness, the posteriorly dominant rhythm consisted of symmetric, well modulated 7.5-8 Hz activity, with an amplitude to 30 uV, that attenuated to eye opening.  Low amplitude central beta was noted in wakefulness.    Background Slowing:  Generalized slowing: intermittent irregular diffuse delta was present.  Focal slowing: none was present.    Sleep Background:  Drowsiness was characterized by fragmentation, attenuation, and slowing of the background activity.    Sleep was characterized by the presence of vertex waves, symmetric spindles, and K-complexes.    Epileptiform Activity:   No epileptiform discharges were present.    Events:  No clinical events were recorded.  No seizures were recorded.    Activation Procedures:   -Hyperventilation was not performed.    -Photic stimulation was not performed.    Artifacts:  Intermittent myogenic and movement artifacts were noted.    ECG:  The heart rate on single channel ECG was predominantly between 90-120BPM with ectopy.  -----------------------------------------------------------------------------------------------------    EEG Classification / Summary:  Abnormal EEG Study   -intermittent irregular diffuse delta was present.  -----------------------------------------------------------------------------------------------------    Clinical Impression:  There were no epileptiform abnormalities recorded.    Mild nonspecific diffuse/multifocal cerebral dysfunction.  ECG abnormalities noted.  -------------------------------------------------------------------------------------------------------  David Haq M.D.   of Neurology, [facility]

## 2018-04-18 NOTE — CONSULT NOTE ADULT - ASSESSMENT
62 yo f transferred from outside hospital as ayaan Lundy MR#[id number],  55  hx R occipital crani for resection of vestibular schwanomma, POD#12, now transferred from [facility], after found unresponsive, confused, started on empiric Vanco and Ceftriaxone.    Cultures obtained at [facility] lab [phone] and pending.   currently on empiric Vanco and Cefepime, she had low grade fever and leukocytosis.   Pt is confused, but follows commands, no specific c/o, currently on EEG  CT: Status post right occipital craniotomy. Subjacent low density extra axial   collection measuring 7 mm in greatest depth.      PLAN:  cont empiric Abx therapy for possible post-surgery meningitis, ?abscess  f/u CT and cultures from [facility]  f/u CSF results  serial CT as per neurosurgery

## 2018-04-19 LAB
ANION GAP SERPL CALC-SCNC: 13 MMOL/L — SIGNIFICANT CHANGE UP (ref 5–17)
ANION GAP SERPL CALC-SCNC: 13 MMOL/L — SIGNIFICANT CHANGE UP (ref 5–17)
BUN SERPL-MCNC: 11 MG/DL — SIGNIFICANT CHANGE UP (ref 7–23)
BUN SERPL-MCNC: 8 MG/DL — SIGNIFICANT CHANGE UP (ref 7–23)
CALCIUM SERPL-MCNC: 8.8 MG/DL — SIGNIFICANT CHANGE UP (ref 8.4–10.5)
CALCIUM SERPL-MCNC: 9.5 MG/DL — SIGNIFICANT CHANGE UP (ref 8.4–10.5)
CHLORIDE SERPL-SCNC: 100 MMOL/L — SIGNIFICANT CHANGE UP (ref 96–108)
CHLORIDE SERPL-SCNC: 98 MMOL/L — SIGNIFICANT CHANGE UP (ref 96–108)
CK MB BLD-MCNC: 0.4 % — SIGNIFICANT CHANGE UP (ref 0–3.5)
CK MB BLD-MCNC: 0.9 % — SIGNIFICANT CHANGE UP (ref 0–3.5)
CK MB BLD-MCNC: 1.6 % — SIGNIFICANT CHANGE UP (ref 0–3.5)
CK MB CFR SERPL CALC: 1.7 NG/ML — SIGNIFICANT CHANGE UP (ref 0–3.8)
CK MB CFR SERPL CALC: 1.8 NG/ML — SIGNIFICANT CHANGE UP (ref 0–3.8)
CK MB CFR SERPL CALC: 1.9 NG/ML — SIGNIFICANT CHANGE UP (ref 0–3.8)
CK SERPL-CCNC: 116 U/L — SIGNIFICANT CHANGE UP (ref 25–170)
CK SERPL-CCNC: 184 U/L — HIGH (ref 25–170)
CK SERPL-CCNC: 448 U/L — HIGH (ref 25–170)
CO2 SERPL-SCNC: 20 MMOL/L — LOW (ref 22–31)
CO2 SERPL-SCNC: 22 MMOL/L — SIGNIFICANT CHANGE UP (ref 22–31)
CREAT SERPL-MCNC: 0.68 MG/DL — SIGNIFICANT CHANGE UP (ref 0.5–1.3)
CREAT SERPL-MCNC: 0.82 MG/DL — SIGNIFICANT CHANGE UP (ref 0.5–1.3)
GLUCOSE SERPL-MCNC: 108 MG/DL — HIGH (ref 70–99)
GLUCOSE SERPL-MCNC: 175 MG/DL — HIGH (ref 70–99)
HCT VFR BLD CALC: 34 % — LOW (ref 34.5–45)
HCT VFR BLD CALC: 37.8 % — SIGNIFICANT CHANGE UP (ref 34.5–45)
HGB BLD-MCNC: 11.8 G/DL — SIGNIFICANT CHANGE UP (ref 11.5–15.5)
HGB BLD-MCNC: 13.1 G/DL — SIGNIFICANT CHANGE UP (ref 11.5–15.5)
MAGNESIUM SERPL-MCNC: 1.4 MG/DL — LOW (ref 1.6–2.6)
MAGNESIUM SERPL-MCNC: 2.2 MG/DL — SIGNIFICANT CHANGE UP (ref 1.6–2.6)
MCHC RBC-ENTMCNC: 34.7 GM/DL — SIGNIFICANT CHANGE UP (ref 32–36)
MCHC RBC-ENTMCNC: 34.7 GM/DL — SIGNIFICANT CHANGE UP (ref 32–36)
MCHC RBC-ENTMCNC: 36.4 PG — HIGH (ref 27–34)
MCHC RBC-ENTMCNC: 36.4 PG — HIGH (ref 27–34)
MCV RBC AUTO: 105 FL — HIGH (ref 80–100)
MCV RBC AUTO: 105 FL — HIGH (ref 80–100)
PHOSPHATE SERPL-MCNC: 2 MG/DL — LOW (ref 2.5–4.5)
PHOSPHATE SERPL-MCNC: 2.3 MG/DL — LOW (ref 2.5–4.5)
PLATELET # BLD AUTO: 272 K/UL — SIGNIFICANT CHANGE UP (ref 150–400)
PLATELET # BLD AUTO: 315 K/UL — SIGNIFICANT CHANGE UP (ref 150–400)
POTASSIUM SERPL-MCNC: 3.5 MMOL/L — SIGNIFICANT CHANGE UP (ref 3.5–5.3)
POTASSIUM SERPL-MCNC: 3.9 MMOL/L — SIGNIFICANT CHANGE UP (ref 3.5–5.3)
POTASSIUM SERPL-SCNC: 3.5 MMOL/L — SIGNIFICANT CHANGE UP (ref 3.5–5.3)
POTASSIUM SERPL-SCNC: 3.9 MMOL/L — SIGNIFICANT CHANGE UP (ref 3.5–5.3)
RBC # BLD: 3.24 M/UL — LOW (ref 3.8–5.2)
RBC # BLD: 3.6 M/UL — LOW (ref 3.8–5.2)
RBC # FLD: 12.2 % — SIGNIFICANT CHANGE UP (ref 10.3–14.5)
RBC # FLD: 12.3 % — SIGNIFICANT CHANGE UP (ref 10.3–14.5)
SODIUM SERPL-SCNC: 131 MMOL/L — LOW (ref 135–145)
SODIUM SERPL-SCNC: 135 MMOL/L — SIGNIFICANT CHANGE UP (ref 135–145)
TROPONIN T SERPL-MCNC: <0.01 NG/ML — SIGNIFICANT CHANGE UP (ref 0–0.06)
VANCOMYCIN TROUGH SERPL-MCNC: 12.2 UG/ML — SIGNIFICANT CHANGE UP (ref 10–20)
WBC # BLD: 9 K/UL — SIGNIFICANT CHANGE UP (ref 3.8–10.5)
WBC # BLD: 9.4 K/UL — SIGNIFICANT CHANGE UP (ref 3.8–10.5)
WBC # FLD AUTO: 9 K/UL — SIGNIFICANT CHANGE UP (ref 3.8–10.5)
WBC # FLD AUTO: 9.4 K/UL — SIGNIFICANT CHANGE UP (ref 3.8–10.5)

## 2018-04-19 PROCEDURE — 70450 CT HEAD/BRAIN W/O DYE: CPT | Mod: 26

## 2018-04-19 PROCEDURE — 99233 SBSQ HOSP IP/OBS HIGH 50: CPT

## 2018-04-19 RX ORDER — SODIUM CHLORIDE 9 MG/ML
2 INJECTION INTRAMUSCULAR; INTRAVENOUS; SUBCUTANEOUS THREE TIMES A DAY
Qty: 0 | Refills: 0 | Status: DISCONTINUED | OUTPATIENT
Start: 2018-04-19 | End: 2018-04-20

## 2018-04-19 RX ORDER — MORPHINE SULFATE 50 MG/1
2 CAPSULE, EXTENDED RELEASE ORAL ONCE
Qty: 0 | Refills: 0 | Status: DISCONTINUED | OUTPATIENT
Start: 2018-04-19 | End: 2018-04-19

## 2018-04-19 RX ORDER — VANCOMYCIN HCL 1 G
1000 VIAL (EA) INTRAVENOUS EVERY 8 HOURS
Qty: 0 | Refills: 0 | Status: DISCONTINUED | OUTPATIENT
Start: 2018-04-19 | End: 2018-04-24

## 2018-04-19 RX ORDER — POTASSIUM CHLORIDE 20 MEQ
40 PACKET (EA) ORAL ONCE
Qty: 0 | Refills: 0 | Status: COMPLETED | OUTPATIENT
Start: 2018-04-19 | End: 2018-04-19

## 2018-04-19 RX ORDER — ACETAMINOPHEN 500 MG
1000 TABLET ORAL ONCE
Qty: 0 | Refills: 0 | Status: COMPLETED | OUTPATIENT
Start: 2018-04-19 | End: 2018-04-19

## 2018-04-19 RX ORDER — HYDRALAZINE HCL 50 MG
10 TABLET ORAL ONCE
Qty: 0 | Refills: 0 | Status: COMPLETED | OUTPATIENT
Start: 2018-04-19 | End: 2018-04-19

## 2018-04-19 RX ORDER — OXYCODONE HYDROCHLORIDE 5 MG/1
5 TABLET ORAL ONCE
Qty: 0 | Refills: 0 | Status: DISCONTINUED | OUTPATIENT
Start: 2018-04-19 | End: 2018-04-19

## 2018-04-19 RX ORDER — MAGNESIUM SULFATE 500 MG/ML
2 VIAL (ML) INJECTION ONCE
Qty: 0 | Refills: 0 | Status: COMPLETED | OUTPATIENT
Start: 2018-04-19 | End: 2018-04-19

## 2018-04-19 RX ORDER — HYDRALAZINE HCL 50 MG
5 TABLET ORAL ONCE
Qty: 0 | Refills: 0 | Status: COMPLETED | OUTPATIENT
Start: 2018-04-19 | End: 2018-04-19

## 2018-04-19 RX ORDER — OXYCODONE HYDROCHLORIDE 5 MG/1
5 TABLET ORAL EVERY 6 HOURS
Qty: 0 | Refills: 0 | Status: DISCONTINUED | OUTPATIENT
Start: 2018-04-19 | End: 2018-04-25

## 2018-04-19 RX ORDER — ACETAMINOPHEN 500 MG
650 TABLET ORAL EVERY 6 HOURS
Qty: 0 | Refills: 0 | Status: DISCONTINUED | OUTPATIENT
Start: 2018-04-19 | End: 2018-05-02

## 2018-04-19 RX ORDER — LOSARTAN POTASSIUM 100 MG/1
25 TABLET, FILM COATED ORAL DAILY
Qty: 0 | Refills: 0 | Status: DISCONTINUED | OUTPATIENT
Start: 2018-04-19 | End: 2018-05-02

## 2018-04-19 RX ADMIN — Medication 10 MILLIGRAM(S): at 04:00

## 2018-04-19 RX ADMIN — MORPHINE SULFATE 2 MILLIGRAM(S): 50 CAPSULE, EXTENDED RELEASE ORAL at 12:13

## 2018-04-19 RX ADMIN — Medication 250 MILLIGRAM(S): at 05:18

## 2018-04-19 RX ADMIN — LEVETIRACETAM 1000 MILLIGRAM(S): 250 TABLET, FILM COATED ORAL at 17:00

## 2018-04-19 RX ADMIN — Medication 25 MILLIGRAM(S): at 17:00

## 2018-04-19 RX ADMIN — Medication 10 MILLIGRAM(S): at 02:00

## 2018-04-19 RX ADMIN — Medication 40 MILLIEQUIVALENT(S): at 05:18

## 2018-04-19 RX ADMIN — Medication 50 GRAM(S): at 23:30

## 2018-04-19 RX ADMIN — Medication 25 MILLIGRAM(S): at 05:18

## 2018-04-19 RX ADMIN — Medication 5 MILLIGRAM(S): at 17:01

## 2018-04-19 RX ADMIN — Medication 400 MILLIGRAM(S): at 03:00

## 2018-04-19 RX ADMIN — LOSARTAN POTASSIUM 25 MILLIGRAM(S): 100 TABLET, FILM COATED ORAL at 19:41

## 2018-04-19 RX ADMIN — NYSTATIN CREAM 1 APPLICATION(S): 100000 CREAM TOPICAL at 18:05

## 2018-04-19 RX ADMIN — MORPHINE SULFATE 2 MILLIGRAM(S): 50 CAPSULE, EXTENDED RELEASE ORAL at 07:34

## 2018-04-19 RX ADMIN — OXYCODONE HYDROCHLORIDE 5 MILLIGRAM(S): 5 TABLET ORAL at 02:43

## 2018-04-19 RX ADMIN — CEFEPIME 100 MILLIGRAM(S): 1 INJECTION, POWDER, FOR SOLUTION INTRAMUSCULAR; INTRAVENOUS at 13:18

## 2018-04-19 RX ADMIN — Medication 10 MILLIGRAM(S): at 11:33

## 2018-04-19 RX ADMIN — LEVETIRACETAM 1000 MILLIGRAM(S): 250 TABLET, FILM COATED ORAL at 05:18

## 2018-04-19 RX ADMIN — SODIUM CHLORIDE 2 GRAM(S): 9 INJECTION INTRAMUSCULAR; INTRAVENOUS; SUBCUTANEOUS at 23:13

## 2018-04-19 RX ADMIN — ENOXAPARIN SODIUM 40 MILLIGRAM(S): 100 INJECTION SUBCUTANEOUS at 17:00

## 2018-04-19 RX ADMIN — Medication 250 MILLIGRAM(S): at 21:07

## 2018-04-19 RX ADMIN — NYSTATIN CREAM 1 APPLICATION(S): 100000 CREAM TOPICAL at 05:18

## 2018-04-19 RX ADMIN — Medication 650 MILLIGRAM(S): at 19:30

## 2018-04-19 RX ADMIN — Medication 250 MILLIGRAM(S): at 17:33

## 2018-04-19 RX ADMIN — Medication 300 MILLIGRAM(S): at 11:33

## 2018-04-19 RX ADMIN — CEFEPIME 100 MILLIGRAM(S): 1 INJECTION, POWDER, FOR SOLUTION INTRAMUSCULAR; INTRAVENOUS at 21:06

## 2018-04-19 RX ADMIN — MORPHINE SULFATE 2 MILLIGRAM(S): 50 CAPSULE, EXTENDED RELEASE ORAL at 11:43

## 2018-04-19 RX ADMIN — Medication 650 MILLIGRAM(S): at 20:00

## 2018-04-19 RX ADMIN — CEFEPIME 100 MILLIGRAM(S): 1 INJECTION, POWDER, FOR SOLUTION INTRAMUSCULAR; INTRAVENOUS at 05:18

## 2018-04-19 RX ADMIN — OXYCODONE HYDROCHLORIDE 5 MILLIGRAM(S): 5 TABLET ORAL at 03:15

## 2018-04-19 RX ADMIN — Medication 1000 MILLIGRAM(S): at 03:15

## 2018-04-19 NOTE — DIETITIAN INITIAL EVALUATION ADULT. - OTHER INFO
Pt seen in NSCU for LOS. Pt reports currently decreased appetite. Pt's current weight and dosing weight is 154.9 lb. Pt reports weight PTA of 170lbs. Pt denies any weight loss, states that she does not weigh herself often and thinks that her UBW is somewhere in between 154-170lbs, denies feelings of clothes getting loser. Pt' denies any chewing or swallowing difficulties. Pt reports some nausea and diarrhea currently and diarrhea frequently PTA. Last BM yesterday. Pt states that she notices she has diarrhea when she consumes milk. Pt with NKFA. Pt seen in NSCU for LOS. Pt reports currently decreased appetite. Pt's current weight and dosing weight is 154.9 lb. Pt unsure of UBW, states that she does not weigh herself often. Pt denies any recent weight loss, denies feelings of clothes getting loser. Pt' denies any chewing or swallowing difficulties. Pt reports some nausea and diarrhea currently and diarrhea frequently PTA. Last BM yesterday. Pt states that she notices that she has diarrhea occasionally when she consumes milk (denies any issues with yogurt, cheese, and other dairy products). Pt with NKFA.

## 2018-04-19 NOTE — DIETITIAN INITIAL EVALUATION ADULT. - ORAL INTAKE PTA
Pt reports good appetite and PO intake PTA. Pt's typical food intake includes: Breakfast- toast, tea; Lunch- sandwich, orange; Dinner- pasta, meat. Pt's supplement intake PTA includes MVI and another type of micronutrient supplement (pt cannot recall the name of supplement)/good

## 2018-04-19 NOTE — DIETITIAN INITIAL EVALUATION ADULT. - NS AS NUTRI INTERV MEDICAL AND FOOD SUPPLEMENTS
Pt declined Ensure at this time. Pt amenable to trying health shakes, will add to pt's meals. Continue to monitor pt's need for additional oral nutritional supplement./Commercial beverage Pt declined Ensure at this time. Pt amendable to adding a snack with protein to assist with protein intake. Will add snack to pt's meals. Continue to monitor pt's need for additional nutritional supplement./Commercial beverage

## 2018-04-19 NOTE — DIETITIAN INITIAL EVALUATION ADULT. - ENERGY NEEDS
Ht: 67in, Wt: 154.9lbs (dosing), BMI: 24.3, IBW: 135lbs, IBW+/-10%=122-149lbs, %IBW: 118  Other pertinent objective information: 62 yo female POD 14 from right retrosigmoid VS resection. Admitted after she was reportedly found down and confused. Pt with possible meningitis.  Seizures, reported as GTC, responded to Ativan, and currently leaking incision. Pt with no PMH listed in chart.   No edema. Pt with surgical incision, no pressure injuries noted at this time. Ht: 67in, Wt: 154.9lbs (dosing), BMI: 24.3, IBW: 135lbs, IBW+/-10%=122-149lbs, %IBW: 118  Other pertinent objective information: 64 yo female POD 14 from right retrosigmoid VS resection. Admitted after she was reportedly found down and confused. Pt with possible meningitis.  Seizures, reported as GTC, responded to Ativan, and currently leaking incision. Pt with no PMH listed in chart, however noted in chart ? ETOH abuse.   No edema. Pt with surgical incision, no pressure injuries noted at this time.

## 2018-04-19 NOTE — DIETITIAN INITIAL EVALUATION ADULT. - NS AS NUTRI INTERV ED CONTENT
Purpose of the nutrition education/Pt provided with education on increased protein needs and general healthy diet. Reviewed: 1) Importance of increased protein needs to assist with healing and foods that are a good source of protein; 2) consuming three healthy balanced meals each day; 3) limiting sodium intake, foods that are high in sodium, avoiding adding salt to foods, alternative salt options, 4) foods with insoluble fiber to assist with diarrhea, 5) Pt encouraged to consume health shakes. Pt provided with education on increased protein needs and general healthy diet. Reviewed: 1) Importance of increased protein needs to assist with healing and foods that are a good source of protein; 2) consuming three healthy balanced meals each day; 3) limiting sodium intake, foods that are high in sodium, avoiding adding salt to foods, alternative salt options, 4) foods with insoluble fiber to assist with diarrhea, 5) Pt encouraged to consume protein snacks./Purpose of the nutrition education

## 2018-04-19 NOTE — PROGRESS NOTE ADULT - SUBJECTIVE AND OBJECTIVE BOX
CC: f/u for fever, AMS    Patient remains in ICU, currently non-toxic, she had episode of leak from R parietal incision of serous fluid, which now was reinforced with sutures and leak has resolved. no cultures obtained    REVIEW OF SYSTEMS: no fevers, no chills, no nausea, no vomiting, no abd pain, no resp symptoms  All other review of systems negative (Comprehensive ROS)    Antimicrobials Day #    cefepime  IVPB      cefepime  IVPB 2000 milliGRAM(s) IV Intermittent every 8 hours  vancomycin  IVPB 1000 milliGRAM(s) IV Intermittent every 12 hours    Other Medications Reviewed    T(F): 98 (18 @ 11:00), Max: 98.8 (18 @ 15:00)  HR: 67 (18 @ 11:00)  BP: 165/11 (18 @ 11:00)  RR: 19 (18 @ 11:00)  SpO2: 100% (18 @ 11:00)    PHYSICAL EXAM:  General: alert, no acute distress  Eyes:  anicteric, no conjunctival injection, no discharge  Oropharynx: no lesions or injection 	  Neck: supple, without adenopathy  R lateral scalp incision with fluctuance, sutures and dressing in place, no warmth, redness or tenderness  Lungs: clear to auscultation  Heart: regular rate and rhythm; no murmur, rubs or gallops  Abdomen: soft, nondistended, nontender, without mass or organomegaly  Skin: no lesions  Extremities: no clubbing, cyanosis, or edema  Neurologic: alert, oriented, no focal deficits    LAB RESULTS:                        11.8   9.4   )-----------( 272      ( 2018 01:31 )             34.0         135  |  100  |  11  ----------------------------<  108<H>  3.5   |  22  |  0.82    Ca    8.8      2018 01:31  Phos  2.0       Mg     2.2         TPro  6.0  /  Alb  3.4  /  TBili  2.1<H>  /  DBili  0.4<H>  /  AST  32  /  ALT  44  /  AlkPhos  66      LIVER FUNCTIONS - ( 2018 23:29 )  Alb: 3.4 g/dL / Pro: 6.0 g/dL / ALK PHOS: 66 U/L / ALT: 44 U/L / AST: 32 U/L / GGT: x           Urinalysis Basic - ( 2018 23:27 )    Color: Yellow / Appearance: SL Turbid / S.013 / pH: x  Gluc: x / Ketone: Trace  / Bili: Negative / Urobili: Negative   Blood: x / Protein: 30 mg/dL / Nitrite: Negative   Leuk Esterase: Negative / RBC: 10-25 /HPF / WBC 3-5 /HPF   Sq Epi: x / Non Sq Epi: OCC /HPF / Bacteria: x        MICROBIOLOGY REVIEWED:    RADIOLOGY REVIEWED: CC: f/u for fever, AMS    Patient remains in ICU, currently non-toxic, she had episode of leak from R parietal incision of serous fluid, which now was reinforced with sutures and leak has resolved. no cultures obtained    REVIEW OF SYSTEMS: no fevers, no chills, no nausea, no vomiting, no abd pain, no resp symptoms  All other review of systems negative (Comprehensive ROS)    Antimicrobials Day #2  cefepime  IVPB      cefepime  IVPB 2000 milliGRAM(s) IV Intermittent every 8 hours  vancomycin  IVPB 1000 milliGRAM(s) IV Intermittent every 12 hours    Other Medications Reviewed    T(F): 98 (18 @ 11:00), Max: 98.8 (18 @ 15:00)  HR: 67 (18 @ 11:00)  BP: 165/11 (18 @ 11:00)  RR: 19 (18 @ 11:00)  SpO2: 100% (18 @ 11:00)    PHYSICAL EXAM:  General: alert, no acute distress  Eyes:  anicteric, no conjunctival injection, no discharge  Oropharynx: no lesions or injection 	  Neck: supple, without adenopathy  R lateral scalp incision with fluctuance, sutures and dressing in place, no warmth, redness or tenderness  Lungs: clear to auscultation  Heart: regular rate and rhythm; no murmur, rubs or gallops  Abdomen: soft, nondistended, nontender, without mass or organomegaly  Skin: no lesions  Extremities: no clubbing, cyanosis, or edema  Neurologic: alert, oriented, no focal deficits    LAB RESULTS:                        11.8   9.4   )-----------( 272      ( 2018 01:31 )             34.0         135  |  100  |  11  ----------------------------<  108<H>  3.5   |  22  |  0.82    Ca    8.8      2018 01:31  Phos  2.0       Mg     2.2         TPro  6.0  /  Alb  3.4  /  TBili  2.1<H>  /  DBili  0.4<H>  /  AST  32  /  ALT  44  /  AlkPhos  66      LIVER FUNCTIONS - ( 2018 23:29 )  Alb: 3.4 g/dL / Pro: 6.0 g/dL / ALK PHOS: 66 U/L / ALT: 44 U/L / AST: 32 U/L / GGT: x           Urinalysis Basic - ( 2018 23:27 )    Color: Yellow / Appearance: SL Turbid / S.013 / pH: x  Gluc: x / Ketone: Trace  / Bili: Negative / Urobili: Negative   Blood: x / Protein: 30 mg/dL / Nitrite: Negative   Leuk Esterase: Negative / RBC: 10-25 /HPF / WBC 3-5 /HPF   Sq Epi: x / Non Sq Epi: OCC /HPF / Bacteria: x        MICROBIOLOGY REVIEWED:    RADIOLOGY REVIEWED:

## 2018-04-19 NOTE — DIETITIAN INITIAL EVALUATION ADULT. - NUTRITION INTERVENTION
Medical Food Supplements/Meals and Snack/Nutrition Education Medical Food Supplements/Vitamin/Meals and Snack/Nutrition Education

## 2018-04-19 NOTE — DIETITIAN INITIAL EVALUATION ADULT. - ADHERENCE
Pt reports that she does not follow a therapeutic diet PTA, however reports that she sometimes does not consume three meals a day./n/a

## 2018-04-19 NOTE — PROGRESS NOTE ADULT - ASSESSMENT
ASSESSMENT/PLAN:  64 yo F with recent schwannoma resection, presented with AMS - possible meningitis.  Seizures, reported as GTC, responded to Ativan.    NEURO:  -neurochecks q4hr  -avoid sedative meds  -NSx follows - will address CSF leak  -on empiric treatment of meningitis cefepime and vancomycin, follow csf cx  -CTH with no acute changes, consider MRI brain wwwo once more stable   -continue Keppra 1 gr q12h, vEEG negative on admission  Activity: [x] mobilize as tolerated [] Bedrest [X] PT [X] OT [] PMNR    PULM: protects airways. Tolerates RA.     CV:  MAP goal >65.    RENAL:  Fluids: IVF- IVL   Hyponatremia, resolved with IVF, likely hypovolemia/decreased PO intake, possible CSW- monitor    GI:  Indirect hyperbilirubinemia, H/H stable  Diet: Regular diet   GI prophylaxis [x] not indicated [] PPI [] other:  Bowel regimen [] colace [x] senna [] other:    ENDO:   Goal euglycemia (-180)    HEME/ONC:  VTE prophylaxis: [x] SCDs [x] chemoprophylaxis Lovenox 40 SQ   Macrocytosis - follow blood for vitamin B12, folate- ? Hx of ETOH - on thiamine, folate, MVI   Leukocytosis - improving    ID:  Continue Vanco 1 gr q12hr + Cefepime 2gr q8hr  Follow cultures - BCx, UCx and CSF Cx sent at [facility] prior to Abx (confirmed with their lab [phone])    Misc:  EtOH abuse? (as per EMR, reported by a family member)     SOCIAL/FAMILY:  [] awaiting [x] updated at bedside [] family meeting    CODE STATUS:  [x] Full Code [] DNR [] DNI [] Palliative/Comfort Care    DISPOSITION:  [x] ICU [] Stroke Unit [] Floor [] EMU [] RCU [] PCU    [x] Patient is at high risk of neurologic deterioration/death due to: CNS infection, resp failure, shock, status epilepticus     Time spent: 40 critical care minutes    Contact: [phone] ASSESSMENT/PLAN:  62 yo F with recent schwannoma resection, presented with AMS - possible meningitis.  Seizures, reported as GTC, responded to Ativan.    NEURO:  -neurochecks q4hr  -avoid sedative meds  -NSx follows - will address CSF leak  -on empiric treatment of meningitis cefepime and vancomycin, follow csf cx  -CTH with no acute changes, consider MRI brain wwwo once more stable   -continue Keppra 1 gr q12h, vEEG negative on admission  Activity: [x] mobilize as tolerated [] Bedrest [X] PT [X] OT [] PMNR  Monitor for CSF leak     PULM: protects airways. Tolerates RA.     CV:  MAP goal >65.  NSVT - trponin nl and last EF - nl on Echo   Toleratomg B blockers     RENAL:  Fluids: IVF- IVL   Hyponatremia, resolved with IVF, likely hypovolemia/decreased PO intake, possible CSW- monitor    GI:  Indirect hyperbilirubinemia, H/H stable  Diet: Regular diet   GI prophylaxis [x] not indicated [] PPI [] other:  Bowel regimen [] colace [x] senna [] other:    ENDO:   Goal euglycemia (-180)    HEME/ONC:  VTE prophylaxis: [x] SCDs [x] chemoprophylaxis Lovenox 40 SQ   Macrocytosis - follow blood for vitamin B12, folate- ? Hx of ETOH - on thiamine, folate, MVI   Leukocytosis - improving    ID:  Continue Vanco 1 gr q12hr + Cefepime 2gr q8hr  Follow cultures - BCx, UCx and CSF Cx sent at [facility] prior to Abx (confirmed with their lab [phone])  F/U Vanco trough     Misc:  EtOH abuse? (as per EMR, reported by a family member)     SOCIAL/FAMILY:  [] awaiting [x] updated at bedside [] family meeting    CODE STATUS:  [x] Full Code [] DNR [] DNI [] Palliative/Comfort Care    DISPOSITION:  [x] ICU [] Stroke Unit [] Floor [] EMU [] RCU [] PCU    [x] Patient is at high risk of neurologic deterioration/death due to: CNS infection, resp failure, shock, status epilepticus     Time spent: 40 critical care minutes    Contact: [phone]

## 2018-04-19 NOTE — PROGRESS NOTE ADULT - ASSESSMENT
62 yo f hx R occipital crani for resection of vestibular schwanomma, POD#12, now transferred from [facility], after found unresponsive, confused, started on empiric Vanco and Ceftriaxone.    Cultures obtained at [facility] lab [phone] and pending.   currently on empiric Vanco and Cefepime, she had low grade fever and leukocytosis.   CT: Status post right occipital craniotomy. Subjacent low density extra axial   collection measuring 7 mm in greatest depth.  she had serous fluid leak last night from incision, now sutured      PLAN:  cont empiric Abx therapy for possible post-surgery meningitis, ?abscess  f/u CT and cultures from [facility] and from here  f/u CSF results  serial CT as per neurosurgery  local skin care   d/w neurosurgery team

## 2018-04-20 LAB
ANION GAP SERPL CALC-SCNC: 13 MMOL/L — SIGNIFICANT CHANGE UP (ref 5–17)
ANION GAP SERPL CALC-SCNC: 14 MMOL/L — SIGNIFICANT CHANGE UP (ref 5–17)
BUN SERPL-MCNC: 7 MG/DL — SIGNIFICANT CHANGE UP (ref 7–23)
BUN SERPL-MCNC: 7 MG/DL — SIGNIFICANT CHANGE UP (ref 7–23)
CALCIUM SERPL-MCNC: 9.2 MG/DL — SIGNIFICANT CHANGE UP (ref 8.4–10.5)
CALCIUM SERPL-MCNC: 9.3 MG/DL — SIGNIFICANT CHANGE UP (ref 8.4–10.5)
CHLORIDE SERPL-SCNC: 100 MMOL/L — SIGNIFICANT CHANGE UP (ref 96–108)
CHLORIDE SERPL-SCNC: 99 MMOL/L — SIGNIFICANT CHANGE UP (ref 96–108)
CK MB BLD-MCNC: 0.2 % — SIGNIFICANT CHANGE UP (ref 0–3.5)
CK MB CFR SERPL CALC: 1 NG/ML — SIGNIFICANT CHANGE UP (ref 0–3.8)
CK SERPL-CCNC: 480 U/L — HIGH (ref 25–170)
CO2 SERPL-SCNC: 22 MMOL/L — SIGNIFICANT CHANGE UP (ref 22–31)
CO2 SERPL-SCNC: 24 MMOL/L — SIGNIFICANT CHANGE UP (ref 22–31)
CREAT SERPL-MCNC: 0.67 MG/DL — SIGNIFICANT CHANGE UP (ref 0.5–1.3)
CREAT SERPL-MCNC: 0.73 MG/DL — SIGNIFICANT CHANGE UP (ref 0.5–1.3)
GLUCOSE SERPL-MCNC: 106 MG/DL — HIGH (ref 70–99)
GLUCOSE SERPL-MCNC: 117 MG/DL — HIGH (ref 70–99)
HCT VFR BLD CALC: 34.3 % — LOW (ref 34.5–45)
HGB BLD-MCNC: 12.4 G/DL — SIGNIFICANT CHANGE UP (ref 11.5–15.5)
MAGNESIUM SERPL-MCNC: 1.6 MG/DL — SIGNIFICANT CHANGE UP (ref 1.6–2.6)
MAGNESIUM SERPL-MCNC: 1.9 MG/DL — SIGNIFICANT CHANGE UP (ref 1.6–2.6)
MCHC RBC-ENTMCNC: 36 GM/DL — SIGNIFICANT CHANGE UP (ref 32–36)
MCHC RBC-ENTMCNC: 37.4 PG — HIGH (ref 27–34)
MCV RBC AUTO: 104 FL — HIGH (ref 80–100)
PHOSPHATE SERPL-MCNC: 2.9 MG/DL — SIGNIFICANT CHANGE UP (ref 2.5–4.5)
PHOSPHATE SERPL-MCNC: 3.9 MG/DL — SIGNIFICANT CHANGE UP (ref 2.5–4.5)
PLATELET # BLD AUTO: 300 K/UL — SIGNIFICANT CHANGE UP (ref 150–400)
POTASSIUM SERPL-MCNC: 3.4 MMOL/L — LOW (ref 3.5–5.3)
POTASSIUM SERPL-MCNC: 3.7 MMOL/L — SIGNIFICANT CHANGE UP (ref 3.5–5.3)
POTASSIUM SERPL-SCNC: 3.4 MMOL/L — LOW (ref 3.5–5.3)
POTASSIUM SERPL-SCNC: 3.7 MMOL/L — SIGNIFICANT CHANGE UP (ref 3.5–5.3)
RBC # BLD: 3.31 M/UL — LOW (ref 3.8–5.2)
RBC # FLD: 12.3 % — SIGNIFICANT CHANGE UP (ref 10.3–14.5)
SODIUM SERPL-SCNC: 134 MMOL/L — LOW (ref 135–145)
SODIUM SERPL-SCNC: 138 MMOL/L — SIGNIFICANT CHANGE UP (ref 135–145)
TROPONIN T SERPL-MCNC: <0.01 NG/ML — SIGNIFICANT CHANGE UP (ref 0–0.06)
VANCOMYCIN TROUGH SERPL-MCNC: 18.5 UG/ML — SIGNIFICANT CHANGE UP (ref 10–20)
WBC # BLD: 6.1 K/UL — SIGNIFICANT CHANGE UP (ref 3.8–10.5)
WBC # FLD AUTO: 6.1 K/UL — SIGNIFICANT CHANGE UP (ref 3.8–10.5)

## 2018-04-20 PROCEDURE — 99233 SBSQ HOSP IP/OBS HIGH 50: CPT

## 2018-04-20 PROCEDURE — 71045 X-RAY EXAM CHEST 1 VIEW: CPT | Mod: 26

## 2018-04-20 RX ORDER — POTASSIUM PHOSPHATE, MONOBASIC POTASSIUM PHOSPHATE, DIBASIC 236; 224 MG/ML; MG/ML
15 INJECTION, SOLUTION INTRAVENOUS ONCE
Qty: 0 | Refills: 0 | Status: COMPLETED | OUTPATIENT
Start: 2018-04-20 | End: 2018-04-21

## 2018-04-20 RX ORDER — MAGNESIUM SULFATE 500 MG/ML
2 VIAL (ML) INJECTION ONCE
Qty: 0 | Refills: 0 | Status: COMPLETED | OUTPATIENT
Start: 2018-04-20 | End: 2018-04-21

## 2018-04-20 RX ORDER — OXYCODONE HYDROCHLORIDE 5 MG/1
10 TABLET ORAL EVERY 6 HOURS
Qty: 0 | Refills: 0 | Status: DISCONTINUED | OUTPATIENT
Start: 2018-04-20 | End: 2018-04-23

## 2018-04-20 RX ORDER — SODIUM CHLORIDE 9 MG/ML
1 INJECTION INTRAMUSCULAR; INTRAVENOUS; SUBCUTANEOUS EVERY 8 HOURS
Qty: 0 | Refills: 0 | Status: DISCONTINUED | OUTPATIENT
Start: 2018-04-20 | End: 2018-04-25

## 2018-04-20 RX ORDER — MAGNESIUM SULFATE 500 MG/ML
1 VIAL (ML) INJECTION DAILY
Qty: 0 | Refills: 0 | Status: COMPLETED | OUTPATIENT
Start: 2018-04-20 | End: 2018-04-22

## 2018-04-20 RX ORDER — FOLIC ACID 0.8 MG
1 TABLET ORAL DAILY
Qty: 0 | Refills: 0 | Status: DISCONTINUED | OUTPATIENT
Start: 2018-04-20 | End: 2018-05-02

## 2018-04-20 RX ORDER — POTASSIUM CHLORIDE 20 MEQ
20 PACKET (EA) ORAL
Qty: 0 | Refills: 0 | Status: COMPLETED | OUTPATIENT
Start: 2018-04-20 | End: 2018-04-21

## 2018-04-20 RX ADMIN — OXYCODONE HYDROCHLORIDE 5 MILLIGRAM(S): 5 TABLET ORAL at 19:40

## 2018-04-20 RX ADMIN — Medication 10 MILLIGRAM(S): at 19:05

## 2018-04-20 RX ADMIN — OXYCODONE HYDROCHLORIDE 10 MILLIGRAM(S): 5 TABLET ORAL at 22:30

## 2018-04-20 RX ADMIN — Medication 62.5 MILLIMOLE(S): at 00:48

## 2018-04-20 RX ADMIN — Medication 100 GRAM(S): at 14:36

## 2018-04-20 RX ADMIN — Medication 300 MILLIGRAM(S): at 14:36

## 2018-04-20 RX ADMIN — Medication 25 MILLIGRAM(S): at 05:27

## 2018-04-20 RX ADMIN — NYSTATIN CREAM 1 APPLICATION(S): 100000 CREAM TOPICAL at 05:28

## 2018-04-20 RX ADMIN — OXYCODONE HYDROCHLORIDE 5 MILLIGRAM(S): 5 TABLET ORAL at 05:50

## 2018-04-20 RX ADMIN — SODIUM CHLORIDE 1 GRAM(S): 9 INJECTION INTRAMUSCULAR; INTRAVENOUS; SUBCUTANEOUS at 21:40

## 2018-04-20 RX ADMIN — Medication 250 MILLIGRAM(S): at 21:39

## 2018-04-20 RX ADMIN — LEVETIRACETAM 1000 MILLIGRAM(S): 250 TABLET, FILM COATED ORAL at 05:26

## 2018-04-20 RX ADMIN — OXYCODONE HYDROCHLORIDE 5 MILLIGRAM(S): 5 TABLET ORAL at 20:10

## 2018-04-20 RX ADMIN — NYSTATIN CREAM 1 APPLICATION(S): 100000 CREAM TOPICAL at 19:08

## 2018-04-20 RX ADMIN — OXYCODONE HYDROCHLORIDE 5 MILLIGRAM(S): 5 TABLET ORAL at 06:25

## 2018-04-20 RX ADMIN — SODIUM CHLORIDE 1 GRAM(S): 9 INJECTION INTRAMUSCULAR; INTRAVENOUS; SUBCUTANEOUS at 14:36

## 2018-04-20 RX ADMIN — LEVETIRACETAM 1000 MILLIGRAM(S): 250 TABLET, FILM COATED ORAL at 19:08

## 2018-04-20 RX ADMIN — CEFEPIME 100 MILLIGRAM(S): 1 INJECTION, POWDER, FOR SOLUTION INTRAMUSCULAR; INTRAVENOUS at 05:26

## 2018-04-20 RX ADMIN — SODIUM CHLORIDE 2 GRAM(S): 9 INJECTION INTRAMUSCULAR; INTRAVENOUS; SUBCUTANEOUS at 05:28

## 2018-04-20 RX ADMIN — OXYCODONE HYDROCHLORIDE 10 MILLIGRAM(S): 5 TABLET ORAL at 22:03

## 2018-04-20 RX ADMIN — Medication 250 MILLIGRAM(S): at 15:00

## 2018-04-20 RX ADMIN — Medication 250 MILLIGRAM(S): at 05:26

## 2018-04-20 RX ADMIN — ENOXAPARIN SODIUM 40 MILLIGRAM(S): 100 INJECTION SUBCUTANEOUS at 19:08

## 2018-04-20 RX ADMIN — Medication 25 MILLIGRAM(S): at 19:08

## 2018-04-20 NOTE — PHYSICAL THERAPY INITIAL EVALUATION ADULT - PLANNED THERAPY INTERVENTIONS, PT EVAL
stair training. GOAL: (to be met in 4 wks) negotiate 5 steps with 1 rail and appropriate assistive device with step to step pattern/gait training/bed mobility training/balance training/strengthening/transfer training

## 2018-04-20 NOTE — PROGRESS NOTE ADULT - ASSESSMENT
63F s/p right retrosigmoid crani for resection of vestibular schwanomma p/w confusion and transferred to [facility] for concern for meningitis. Leaking from incision yesterday 4/19/18 s/p oversewn, dressing placed no leak visible overnight.    -f/u CSF results  -Empiric Abx vanc cefepime per ID  -ID follow up  -neurochecks q1h  _pain control  -continue to monitor incision for leak, no need for LD at this time

## 2018-04-20 NOTE — PROGRESS NOTE ADULT - SUBJECTIVE AND OBJECTIVE BOX
Patient seen and examined at bedside.    T(C): 37 (04-19-18 @ 23:00), Max: 37.2 (04-19-18 @ 19:00)  HR: 68 (04-19-18 @ 23:00) (60 - 95)  BP: 142/82 (04-19-18 @ 23:00) (120/77 - 191/120)  RR: 12 (04-19-18 @ 23:00) (12 - 23)  SpO2: 100% (04-19-18 @ 23:00) (96% - 100%)  Wt(kg): --    EXAM:    AOx3, Following Commands  CN: PERRL, EOMI, right facial palsy HB3  Motor: 5/5 throughout, no drift  Sensation intact to light touch  Reflexes: no clonus

## 2018-04-20 NOTE — PROGRESS NOTE ADULT - ASSESSMENT
ASSESSMENT/PLAN: meningitis, seizurse    NEURO:  seizure: levetiracetam  delirium precautions  pain control  Activity: [x] mobilize as tolerated [] Bedrest [x] PT [x] OT [] PMNR    PULM:   incentive spirometry    CV:  MAP goal >65mmHg  NSVT: Tolerating BETA blockers     RENAL:  ivl    GI:  Indirect hyperbilirubinemia, H/H stable  Diet: Regular diet   GI prophylaxis [x] not indicated [] PPI [] other:  Bowel regimen [] colace [x] senna [] other:    ENDO:   Goal euglycemia (-180)    HEME/ONC:  VTE prophylaxis: [x] SCDs [x] chemoprophylaxis Lovenox 40 SQ     ID:  Meningitis: Vanco/cefepime  F/u cx   F/u Vanco trough     Misc/Psych:  EtOH abuse? (as per EMR, reported by a family member)   Monitor for withdrawal    CODE STATUS:  [x] Full Code [] DNR [] DNI [] Palliative/Comfort Care    Contact: [phone]

## 2018-04-20 NOTE — OCCUPATIONAL THERAPY INITIAL EVALUATION ADULT - ADL RETRAINING, OT EVAL
GOALS: Pt will toilet self independently within 2 weeks. Pt will perform LB dressing independently within 2 weeks.

## 2018-04-20 NOTE — OCCUPATIONAL THERAPY INITIAL EVALUATION ADULT - ANTICIPATED DISCHARGE DISPOSITION, OT EVAL
home w/ OT/home with assist for functional tasks as needed and home OT services for Activities of Daily Living re-training

## 2018-04-20 NOTE — OCCUPATIONAL THERAPY INITIAL EVALUATION ADULT - ADDITIONAL COMMENTS
64 yo F with recent schwannoma resection, presented with CoNS meningitis, CSF-leaking surgical wound.  Seizures on admission, reported as GTC, responded to Ativan.

## 2018-04-20 NOTE — PROGRESS NOTE ADULT - SUBJECTIVE AND OBJECTIVE BOX
HPI:   Michelle Gould, (has different prior MRN [id number],  1955). Transferred from [facility] as Edilma Richardson ( 1955).    62 yo female POD 12 from right retrosigmoid VS resection. Admitted after she was reportedly found down and confused - by mother who called 911. No family members at the bedside, history obtained from med records and ED staff. At [place], per report, CSF showed , , glucose 98, protein 240. Febrile to 100.6, tachy to 156, 98% RA, 158/88.  Reportedly seized x3 at OSH at was given Ativan.  Also received Vanco 1 gr + Ceftriaxone 2 gr IV once at [facility].   CSF and BCx, and UCx sent at [facility], confirmed with their lab - tel. [phone]  On arrival to [facility] patient was GCS 9.  4/ possible CSF leak noted overnight from the healing surgical wound. Re-sutured by the NSx team.    Overnight Events: none reported.    ROS: negative [x] unable to obtain as patient is comatose/intubated/aphasic []   VITALS:   T(C): 37.2 (18 @ 03:00), Max: 37.2 (18 @ 19:00)  HR: 77 (18 @ 03:00) (60 - 82)  BP: 156/99 (18 @ 03:00) (136/76 - 191/120)  RR: 13 (18 @ 03:00) (12 - 21)  SpO2: 98% (18 @ 03:00) (98% - 100%)    18 @ 07:01  -  18 @ 07:00  --------------------------------------------------------  IN: 1730 mL / OUT: 1400 mL / NET: 330 mL      LABS:                        13.1   9.0   )-----------( 315      ( 2018 20:24 )             37.8     04-20    134<L>  |  99  |  7   ----------------------------<  106<H>  3.7   |  22  |  0.67    Ca    9.2      2018 04:36  Phos  3.9     04-20  Mg     1.9     04-20        MEDS:  MEDICATIONS  (STANDING):  cefepime  IVPB 2000 milliGRAM(s) IV Intermittent every 8 hours  enoxaparin Injectable 40 milliGRAM(s) SubCutaneous <User Schedule>  levETIRAcetam 1000 milliGRAM(s) Oral two times a day  losartan 25 milliGRAM(s) Oral daily  magnesium sulfate  IVPB 1 Gram(s) IV Intermittent daily  metoprolol tartrate 25 milliGRAM(s) Oral two times a day  nystatin Cream 1 Application(s) Topical two times a day  sodium chloride 2 Gram(s) Oral three times a day  thiamine 300 milliGRAM(s) Oral daily  vancomycin  IVPB 1000 milliGRAM(s) IV Intermittent every 8 hours      [All pertinent recent Imaging/Reports reviewed]    DEVICES: PIVs.      EXAMINATION:  Neurologic Examination: Alert, oriented x3, mild Right facial droop, FC, LONG spontaneously.  HEENT: R suboccipital area with dry dressing over surgical wound  Chest CTAB.  S1S2 present.   lungs good breath sounds bilaterally   Abd: soft, nontender  ext: no edema HPI:   Michelle Gould, (has different prior MRN [id number],  1955). Transferred from [facility] as Edilma Richardson ( 1955).    62 yo female POD 12 from right retrosigmoid VS resection. Admitted after she was reportedly found down and confused - by mother who called 911. No family members at the bedside, history obtained from med records and ED staff. At [place], per report, CSF showed , , glucose 98, protein 240. Febrile to 100.6, tachy to 156, 98% RA, 158/88.  Reportedly seized x3 at OSH at was given Ativan.  Also received Vanco 1 gr + Ceftriaxone 2 gr IV once at [facility].   CSF and BCx, and UCx sent at [facility], confirmed with their lab - tel. [phone]  On arrival to [facility] patient was GCS 9.  4/ possible CSF leak noted overnight from the healing surgical wound. Re-sutured by the NSx team.    Overnight Events: none reported.    ROS: negative [x] unable to obtain as patient is comatose/intubated/aphasic []   VITALS:   T(C): 37.2 (18 @ 03:00), Max: 37.2 (18 @ 19:00)  HR: 77 (18 @ 03:00) (60 - 82)  BP: 156/99 (18 @ 03:00) (136/76 - 191/120)  RR: 13 (18 @ 03:00) (12 - 21)  SpO2: 98% (18 @ 03:00) (98% - 100%)    18 @ 07:01  -  18 @ 07:00  --------------------------------------------------------  IN: 1730 mL / OUT: 1400 mL / NET: 330 mL      LABS:                        13.1   9.0   )-----------( 315      ( 2018 20:24 )             37.8     04-20    134<L>  |  99  |  7   ----------------------------<  106<H>  3.7   |  22  |  0.67    Ca    9.2      2018 04:36  Phos  3.9     04-20  Mg     1.9     04-20        MEDS:  MEDICATIONS  (STANDING):    enoxaparin Injectable 40 milliGRAM(s) SubCutaneous <User Schedule>  levETIRAcetam 1000 milliGRAM(s) Oral two times a day  losartan 25 milliGRAM(s) Oral daily  magnesium sulfate  IVPB 1 Gram(s) IV Intermittent daily  metoprolol tartrate 25 milliGRAM(s) Oral two times a day  nystatin Cream 1 Application(s) Topical two times a day  sodium chloride 2 Gram(s) Oral three times a day  thiamine 300 milliGRAM(s) Oral daily  vancomycin  IVPB 1000 milliGRAM(s) IV Intermittent every 8 hours      DEVICES: PIVs.      EXAMINATION:  Neurologic Examination: Alert, oriented x3, mild Right facial droop, FC, LONG spontaneously.  HEENT: R suboccipital area with dry dressing over surgical wound  Chest CTAB.  S1S2 present.   lungs good breath sounds bilaterally   Abd: soft, nontender  ext: no edema

## 2018-04-20 NOTE — PROGRESS NOTE ADULT - ASSESSMENT
ASSESSMENT/PLAN:  64 yo F with recent schwannoma resection, presented with CoNS meningitis, CSF-leaking surgical wound.  Seizures on admission, reported as GTC, responded to Ativan.    NEURO:  -neurochecks q4hr  -on empiric treatment of meningitis cefepime and vancomycin, follow csf cx sensitivity  -CTH with no acute changes  -continue Keppra 1 gr q12h, vEEG negative on admission  Activity: [x] mobilize as tolerated [] Bedrest [X] PT [X] OT [] PMNR  Monitor for CSF leak     PULM: protects airways. Tolerates RA.     CV:  MAP goal >65.  NSVT - trponin nl and last EF - nl on Echo   Tolerating B blockers     RENAL:  Fluids: IVF- IVL   Hyponatremia, likely combination of hypovolemia/decreased PO intake, CSW - on salt tabs    GI:  Indirect hyperbilirubinemia, H/H stable  Diet: Regular diet   GI prophylaxis [x] not indicated [] PPI [] other:  Bowel regimen [] colace [x] senna [] other:    ENDO:   Goal euglycemia (-180)    HEME/ONC:  VTE prophylaxis: [x] SCDs [x] chemoprophylaxis Lovenox 40 SQ   Macrocytosis -send vitamin B12, folate- ? Hx of ETOH - on thiamine, folate, MVI     ID:  Continue Vanco 1 gr q8hr + Cefepime 2gr q8hr  Follow cultures - CSF Cx with CoNS (confirmed with their lab [phone])  F/U Vanco trough     Misc:  EtOH abuse? (as per EMR, reported by a family member)     SOCIAL/FAMILY:  [x] awaiting [] updated at bedside [] family meeting    CODE STATUS:  [x] Full Code [] DNR [] DNI [] Palliative/Comfort Care    DISPOSITION:  [x] ICU [] Stroke Unit [] Floor [] EMU [] RCU [] PCU    [x] Patient is at high risk of neurologic deterioration/death due to: CNS infection, resp failure, shock, status epilepticus     Time spent: 40 critical care minutes    Contact: [phone] ASSESSMENT/PLAN:  62 yo F with recent schwannoma resection, presented with CoNS meningitis, CSF-leaking surgical wound.  Seizures on admission, reported as GTC, responded to Ativan.    NEURO:  -neurochecks q4hr  -treatment of meningitis- coag neg staph-  vancomycin- F/U trough ; sensitivity ,  -CTH with no acute changes  -continue Keppra 1 gr q12h, vEEG negative on admission  Activity: [x] mobilize as tolerated [] Bedrest [X] PT [X] OT [] PMNR  Monitor for CSF leak - leak 4/19 and suture placed at incision and  no recurrent leak since.     PULM: protects airways. Tolerates RA.     CV:  MAP goal >65.  NSVT - troponin nl and last EF - nl on Echo   Tolerating B blockers   metoprolol for NSVT    RENAL:  Fluids: IVF- IVL   Hyponatremia, likely combination of hypovolemia/decreased PO intake, CSW - on salt tabs wean     GI:  Indirect hyperbilirubinemia, H/H stable  Diet: Regular diet   GI prophylaxis [x] not indicated [] PPI [] other:  Bowel regimen [] colace [] senna [] other:off stool softemers - loose stool over 36 hrs    ENDO:   Goal euglycemia (-180)    HEME/ONC:  VTE prophylaxis: [x] SCDs [x] chemoprophylaxis Lovenox 40 SQ   Macrocytosis -send vitamin B12, folate- ? Hx of ETOH - on thiamine, folate, MVI     ID:  Continue Vanco 1 gr q8hr   Follow cultures - CSF Cx with CoNS (confirmed with their lab [phone])  F/U Vanco trough     Misc:  EtOH abuse? (as per EMR, reported by a family member)     SOCIAL/FAMILY:  [x] awaiting [] updated at bedside [] family meeting    CODE STATUS:  [x] Full Code [] DNR [] DNI [] Palliative/Comfort Care    DISPOSITION:  Floor    [x] Patient is at high risk of neurologic deterioration/death due to: CNS infection, resp failure, shock, status epilepticus     Time spent: 40 critical care minutes    Contact: [phone]

## 2018-04-20 NOTE — PHYSICAL THERAPY INITIAL EVALUATION ADULT - ADDITIONAL COMMENTS
pt lives in a mobile home with her mother, 4 steps to enter with bilateral rails (far apart), right hand dominant  +facial droop R side

## 2018-04-20 NOTE — PROGRESS NOTE ADULT - ASSESSMENT
62 yo f hx R occipital crani for resection of vestibular schwanomma, POD#12, now transferred from [facility], after found unresponsive, confused, started on empiric Vanco and Ceftriaxone.    Cultures obtained at [facility] lab [phone] and pending.   currently on empiric Vanco and Cefepime, she had low grade fever and leukocytosis.   CT: Status post right occipital craniotomy. Subjacent low density extra axial   collection measuring 7 mm in greatest depth.  she had serous fluid leak from incision,--sutured  CSF culture from [facility] with CoNs      PLAN:  cont IV Vanco for possible post-surgery meningitis,  f/u final cultures from [facility] and from here  serial CT as per neurosurgery  local skin care

## 2018-04-20 NOTE — PROGRESS NOTE ADULT - SUBJECTIVE AND OBJECTIVE BOX
63 year-old woman with history of breast cancer status post right lumpectomy, hypertension and recent right vestibular schwannoma resection (4/5/18) admitted with meningitis complicated by possible seizures.     Episode of cerebrospinal fluid leakage from upper neck (part of surgical incision) overnight yesterday; oversewn by NSGY. No further leakage.    EEG: negative for seizures.    ROS: No headache currently, no weakness, no shortness of breath or chest pain    Exam:  General: NAD  HEENT: PERRL  Neuro: Awake, alert, fully oriented, right facial droop, no drift, full strength  Heart: regular  Lungs: Clear  Abd: Soft, +BS  Ext: No c/c/e

## 2018-04-20 NOTE — OCCUPATIONAL THERAPY INITIAL EVALUATION ADULT - PLANNED THERAPY INTERVENTIONS, OT EVAL
balance training/neuromuscular re-education/bed mobility training/parent/caregiver training.../transfer training/ADL retraining

## 2018-04-20 NOTE — PHYSICAL THERAPY INITIAL EVALUATION ADULT - PERTINENT HX OF CURRENT PROBLEM, REHAB EVAL
POD 12 s/p R retrosigmoid crani for resection of vestibular schwanomma, transferred from [facility] after reportedly found down & confused by mother who called 911. At [place], CSF: , , glucose 98, protein 240. Febrile to 100.6, tachy to 156. Reportedly seized x3 & given ativan. transferred to [facility] for concern for meningitis. 4/19 +leak from R parietal incision of serous fluid, oversewn with sutures & leak has resolved. POD 12 s/p R retrosigmoid crani for resection of vestibular schwanomma, transferred from [facility]. after reportedly found down & confused by mother who called 911. At [place], CSF: , , glucose 98, protein 240. Febrile to 100.6, tachy to 156. Reportedly seized x3 & given ativan. transferred to [facility] for concern for meningitis. 4/19 +leak from R parietal incision of serous fluid, oversewn with sutures & leak has resolved.

## 2018-04-20 NOTE — PROGRESS NOTE ADULT - SUBJECTIVE AND OBJECTIVE BOX
CC: f/u for fever, AMS    Patient remains in ICU, currently non-toxic, she had episode of leak from R parietal incision of serous fluid, which now was reinforced with sutures and leak has resolved. no cultures obtained    REVIEW OF SYSTEMS: no fevers, no chills, no nausea, no vomiting, no abd pain, no resp symptoms  All other review of systems negative (Comprehensive ROS)    Antimicrobials Day #3  vancomycin  IVPB 1000 milliGRAM(s) IV Intermittent every 8 hours    Other Medications Reviewed    T(F): 98 (04-19-18 @ 11:00), Max: 98.8 (04-18-18 @ 15:00)  HR: 67 (04-19-18 @ 11:00)  BP: 165/11 (04-19-18 @ 11:00)  RR: 19 (04-19-18 @ 11:00)  SpO2: 100% (04-19-18 @ 11:00)    PHYSICAL EXAM:  General: alert, no acute distress  Eyes:  anicteric, no conjunctival injection, no discharge  Oropharynx: no lesions or injection 	  Neck: supple, without adenopathy  R lateral scalp incision with fluctuance, sutures and dressing in place, no warmth, redness or tenderness  Lungs: clear to auscultation  Heart: regular rate and rhythm; no murmur, rubs or gallops  Abdomen: soft, nondistended, nontender, without mass or organomegaly  Skin: no lesions  Extremities: no clubbing, cyanosis, or edema  Neurologic: alert, oriented, no focal deficits    LAB RESULTS:                        13.1   9.0   )-----------( 315      ( 19 Apr 2018 20:24 )             37.8   04-20    134<L>  |  99  |  7   ----------------------------<  106<H>  3.7   |  22  |  0.67    Ca    9.2      20 Apr 2018 04:36  Phos  3.9     04-20  Mg     1.9     04-20          MICROBIOLOGY REVIEWED:    RADIOLOGY REVIEWED:

## 2018-04-21 PROCEDURE — 99233 SBSQ HOSP IP/OBS HIGH 50: CPT

## 2018-04-21 RX ORDER — HYDRALAZINE HCL 50 MG
10 TABLET ORAL
Qty: 0 | Refills: 0 | Status: DISCONTINUED | OUTPATIENT
Start: 2018-04-21 | End: 2018-04-23

## 2018-04-21 RX ORDER — HYDROMORPHONE HYDROCHLORIDE 2 MG/ML
0.5 INJECTION INTRAMUSCULAR; INTRAVENOUS; SUBCUTANEOUS ONCE
Qty: 0 | Refills: 0 | Status: DISCONTINUED | OUTPATIENT
Start: 2018-04-21 | End: 2018-04-21

## 2018-04-21 RX ORDER — HYDROMORPHONE HYDROCHLORIDE 2 MG/ML
0.5 INJECTION INTRAMUSCULAR; INTRAVENOUS; SUBCUTANEOUS
Qty: 0 | Refills: 0 | Status: DISCONTINUED | OUTPATIENT
Start: 2018-04-21 | End: 2018-04-22

## 2018-04-21 RX ORDER — ACETAMINOPHEN 500 MG
1000 TABLET ORAL ONCE
Qty: 0 | Refills: 0 | Status: COMPLETED | OUTPATIENT
Start: 2018-04-21 | End: 2018-04-21

## 2018-04-21 RX ADMIN — Medication 1 MILLIGRAM(S): at 12:26

## 2018-04-21 RX ADMIN — Medication 1000 MILLIGRAM(S): at 23:24

## 2018-04-21 RX ADMIN — ENOXAPARIN SODIUM 40 MILLIGRAM(S): 100 INJECTION SUBCUTANEOUS at 17:33

## 2018-04-21 RX ADMIN — HYDROMORPHONE HYDROCHLORIDE 0.5 MILLIGRAM(S): 2 INJECTION INTRAMUSCULAR; INTRAVENOUS; SUBCUTANEOUS at 16:35

## 2018-04-21 RX ADMIN — LOSARTAN POTASSIUM 25 MILLIGRAM(S): 100 TABLET, FILM COATED ORAL at 05:15

## 2018-04-21 RX ADMIN — Medication 250 MILLIGRAM(S): at 05:15

## 2018-04-21 RX ADMIN — Medication 1 TABLET(S): at 12:26

## 2018-04-21 RX ADMIN — Medication 400 MILLIGRAM(S): at 22:54

## 2018-04-21 RX ADMIN — OXYCODONE HYDROCHLORIDE 10 MILLIGRAM(S): 5 TABLET ORAL at 06:05

## 2018-04-21 RX ADMIN — Medication 25 MILLIGRAM(S): at 17:34

## 2018-04-21 RX ADMIN — OXYCODONE HYDROCHLORIDE 10 MILLIGRAM(S): 5 TABLET ORAL at 18:07

## 2018-04-21 RX ADMIN — HYDROMORPHONE HYDROCHLORIDE 0.5 MILLIGRAM(S): 2 INJECTION INTRAMUSCULAR; INTRAVENOUS; SUBCUTANEOUS at 22:14

## 2018-04-21 RX ADMIN — OXYCODONE HYDROCHLORIDE 10 MILLIGRAM(S): 5 TABLET ORAL at 13:15

## 2018-04-21 RX ADMIN — SODIUM CHLORIDE 1 GRAM(S): 9 INJECTION INTRAMUSCULAR; INTRAVENOUS; SUBCUTANEOUS at 05:16

## 2018-04-21 RX ADMIN — Medication 250 MILLIGRAM(S): at 15:00

## 2018-04-21 RX ADMIN — OXYCODONE HYDROCHLORIDE 10 MILLIGRAM(S): 5 TABLET ORAL at 12:25

## 2018-04-21 RX ADMIN — Medication 20 MILLIEQUIVALENT(S): at 00:16

## 2018-04-21 RX ADMIN — LEVETIRACETAM 1000 MILLIGRAM(S): 250 TABLET, FILM COATED ORAL at 18:08

## 2018-04-21 RX ADMIN — POTASSIUM PHOSPHATE, MONOBASIC POTASSIUM PHOSPHATE, DIBASIC 62.5 MILLIMOLE(S): 236; 224 INJECTION, SOLUTION INTRAVENOUS at 00:15

## 2018-04-21 RX ADMIN — OXYCODONE HYDROCHLORIDE 10 MILLIGRAM(S): 5 TABLET ORAL at 18:37

## 2018-04-21 RX ADMIN — Medication 250 MILLIGRAM(S): at 20:59

## 2018-04-21 RX ADMIN — Medication 20 MILLIEQUIVALENT(S): at 02:13

## 2018-04-21 RX ADMIN — HYDROMORPHONE HYDROCHLORIDE 0.5 MILLIGRAM(S): 2 INJECTION INTRAMUSCULAR; INTRAVENOUS; SUBCUTANEOUS at 21:44

## 2018-04-21 RX ADMIN — OXYCODONE HYDROCHLORIDE 10 MILLIGRAM(S): 5 TABLET ORAL at 06:25

## 2018-04-21 RX ADMIN — SODIUM CHLORIDE 1 GRAM(S): 9 INJECTION INTRAMUSCULAR; INTRAVENOUS; SUBCUTANEOUS at 21:04

## 2018-04-21 RX ADMIN — NYSTATIN CREAM 1 APPLICATION(S): 100000 CREAM TOPICAL at 05:15

## 2018-04-21 RX ADMIN — SODIUM CHLORIDE 1 GRAM(S): 9 INJECTION INTRAMUSCULAR; INTRAVENOUS; SUBCUTANEOUS at 17:34

## 2018-04-21 RX ADMIN — NYSTATIN CREAM 1 APPLICATION(S): 100000 CREAM TOPICAL at 17:34

## 2018-04-21 RX ADMIN — HYDROMORPHONE HYDROCHLORIDE 0.5 MILLIGRAM(S): 2 INJECTION INTRAMUSCULAR; INTRAVENOUS; SUBCUTANEOUS at 16:50

## 2018-04-21 RX ADMIN — Medication 10 MILLIGRAM(S): at 19:05

## 2018-04-21 RX ADMIN — Medication 25 MILLIGRAM(S): at 05:16

## 2018-04-21 RX ADMIN — Medication 10 MILLIGRAM(S): at 20:50

## 2018-04-21 RX ADMIN — LEVETIRACETAM 1000 MILLIGRAM(S): 250 TABLET, FILM COATED ORAL at 05:16

## 2018-04-21 RX ADMIN — Medication 20 MILLIEQUIVALENT(S): at 04:15

## 2018-04-21 RX ADMIN — Medication 50 GRAM(S): at 00:15

## 2018-04-21 RX ADMIN — Medication 100 GRAM(S): at 12:00

## 2018-04-21 RX ADMIN — Medication 300 MILLIGRAM(S): at 12:27

## 2018-04-21 NOTE — PROGRESS NOTE ADULT - ASSESSMENT
ASSESSMENT/PLAN:  64 yo F with recent schwannoma resection, presented with CoNS meningitis.  CSF-leaking surgical wound, re-sutured.  Seizures on admission, reported as GTC, responded to Ativan.    NEURO:  -neurochecks q4hr  -treatment of meningitis- coag neg staph-  vancomycin- F/U trough ; sensitivity ,  -CTH with no acute changes  -continue Keppra 1 gr q12h - witnessed sz in ED, vEEG negative while in ICU  Activity: [x] mobilize as tolerated [] Bedrest [X] PT [X] OT [] PMNR  -Monitor for CSF leak - leak 4/19 and suture placed at incision; no recurrent leak since.     PULM: protects airways. Tolerates RA.     CV:  MAP goal >65.  NSVT - troponin nl and last EF - nl on Echo   Tolerating B blockers   metoprolol for NSVT    RENAL:  Fluids: IVF- IVL   Hyponatremia, likely combination of hypovolemia/decreased PO intake, resolved.   Continue salt tabs    GI:  Indirect hyperbilirubinemia, H/H stable  Diet: Regular diet   GI prophylaxis [x] not indicated [] PPI [] other:  Bowel regimen [] colace [] senna [] other: off stool softemers - loose stool     ENDO:   Goal euglycemia (-180)    HEME/ONC:  VTE prophylaxis: [x] SCDs [x] chemoprophylaxis Lovenox 40 SQ   Macrocytosis -send vitamin B12, folate- ? Hx of ETOH - on thiamine, folate, MVI     ID:  Continue Vanco 1 gr q8hr   Follow cultures - CSF Cx with CoNS (confirmed with their lab [phone])  F/U Vanco trough     Misc:  EtOH abuse? (as per EMR, reported by a family member)     SOCIAL/FAMILY:  [x] awaiting [] updated at bedside [] family meeting    CODE STATUS:  [x] Full Code [] DNR [] DNI [] Palliative/Comfort Care    DISPOSITION:  Floor    [x] Patient is at high risk of neurologic deterioration/death due to: CNS infection, resp failure, shock, status epilepticus     Time spent: 40 critical care minutes    Contact: [phone] ASSESSMENT/PLAN:  62 yo F with recent schwannoma resection, presented with CoNS meningitis.  CSF-leaking surgical wound, re-sutured.  Seizures on admission, reported as GTC, she is on keppra     NEURO:  -neurochecks q4hr  -treatment of meningitis- coag neg staph-  vancomycin- F/U trough ; sensitivity ,  -CTH with no acute changes  -continue Keppra 1 gr q12h - witnessed sz in ED, vEEG negative while in ICU  Activity: [x] mobilize as tolerated [] Bedrest [X] PT [X] OT [] PMNR  -Monitor for CSF leak - leak 4/19 and suture placed at incision; no recurrent leak since.     PULM: protects airways. Tolerates RA.     CV:  MAP goal >65.  NSVT - troponin nl and last EF - nl on Echo   Tolerating B blockers   metoprolol for NSVT    RENAL:  Fluids: IVF- IVL   Hyponatremia, likely combination of hypovolemia/decreased PO intake, resolved.   Continue salt tabs    GI:  Indirect hyperbilirubinemia, H/H stable  Diet: Regular diet   GI prophylaxis [x] not indicated [] PPI [] other:  Bowel regimen [] colace [] senna [] other: off stool softemers - loose stool     ENDO:   Goal euglycemia (-180)    HEME/ONC:  VTE prophylaxis: [x] SCDs [x] chemoprophylaxis Lovenox 40 SQ   Macrocytosis -send vitamin B12, folate- ? Hx of ETOH - on thiamine, folate, MVI     ID:  Continue Vanco 1 gr q8hr   Follow cultures - CSF Cx with CoNS (confirmed with their lab [phone])  F/U Vanco trough     Misc:  EtOH abuse? (as per EMR, reported by a family member)     SOCIAL/FAMILY:  [x] awaiting [] updated at bedside [] family meeting    CODE STATUS:  [x] Full Code [] DNR [] DNI [] Palliative/Comfort Care    DISPOSITION:  Floor          Contact: [phone]

## 2018-04-21 NOTE — PROGRESS NOTE ADULT - ASSESSMENT
63F s/p right retrosigmoid crani for resection of vestibular schwanomma p/w confusion and transferred to [facility] for concern for meningitis. Leaking from incision 4/19/18 s/p oversewn, no leak visible overnight.    -Vanco for Coag negative staph growth in CSF  -neurochecks q1h  -pain control  -continue to monitor incision for leak, no need for LD at this time  -Transfer to floor in AM

## 2018-04-21 NOTE — PROGRESS NOTE ADULT - ASSESSMENT
ASSESSMENT/PLAN: meningitis, seizurse    NEURO:  seizure: levetiracetam  delirium precautions  pain control  Activity: [x] mobilize as tolerated [] Bedrest [x] PT [x] OT [] PMNR    PULM:   incentive spirometry    CV:  MAP goal >65mmHg  NSVT: Tolerating BETA blockers     RENAL:  ivl    GI:  Diet: Regular diet   GI prophylaxis [x] not indicated [] PPI [] other:  Bowel regimen [] colace [x] senna [] other:    ENDO:   Goal euglycemia (-180)    HEME/ONC:  VTE prophylaxis: [x] SCDs [x] chemoprophylaxis Lovenox 40 SQ     ID:  Meningitis: Vanco  F/u cx   F/u Vanco trough     Misc/Psych:  EtOH abuse? (as per EMR, reported by a family member)   Monitor for withdrawal    CODE STATUS:  [x] Full Code [] DNR [] DNI [] Palliative/Comfort Care    Contact: [phone]

## 2018-04-21 NOTE — PROGRESS NOTE ADULT - SUBJECTIVE AND OBJECTIVE BOX
CC: f/u for fever, AMS    Patient remains in ICU, currently non-toxic, no events overnight    REVIEW OF SYSTEMS: no fevers, no chills, no nausea, no vomiting, no abd pain, no resp symptoms  All other review of systems negative (Comprehensive ROS)    Antimicrobials Day #4  vancomycin  IVPB 1000 milliGRAM(s) IV Intermittent every 8 hours    Other Medications Reviewed    Vital Signs Last 24 Hrs  T(C): 37 (21 Apr 2018 03:00), Max: 37 (20 Apr 2018 19:00)  T(F): 98.6 (21 Apr 2018 03:00), Max: 98.6 (20 Apr 2018 19:00)  HR: 63 (21 Apr 2018 07:00) (63 - 85)  BP: 157/85 (21 Apr 2018 07:00) (123/92 - 166/97)  BP(mean): 107 (21 Apr 2018 07:00) (93 - 117)  RR: 9 (21 Apr 2018 07:00) (9 - 23)  SpO2: 98% (21 Apr 2018 07:00) (96% - 99%)    PHYSICAL EXAM:  General: alert, no acute distress  Eyes:  anicteric, no conjunctival injection, no discharge  Oropharynx: no lesions or injection 	  Neck: supple, without adenopathy  R lateral scalp incision with fluctuance, sutures and dressing in place, no warmth, redness or tenderness  Lungs: clear to auscultation  Heart: regular rate and rhythm; no murmur, rubs or gallops  Abdomen: soft, nondistended, nontender, without mass or organomegaly  Skin: no lesions  Extremities: no clubbing, cyanosis, or edema  Neurologic: alert, oriented, no focal deficits    LAB RESULTS:                        13.1   9.0   )-----------( 315      ( 19 Apr 2018 20:24 )             37.8   04-20    134<L>  |  99  |  7   ----------------------------<  106<H>  3.7   |  22  |  0.67    Ca    9.2      20 Apr 2018 04:36  Phos  3.9     04-20  Mg     1.9     04-20          MICROBIOLOGY REVIEWED:    RADIOLOGY REVIEWED:

## 2018-04-21 NOTE — PROGRESS NOTE ADULT - ASSESSMENT
62 yo f hx R occipital crani for resection of vestibular schwanomma, POD#12, now transferred from [facility], after found unresponsive, confused, started on empiric Vanco and Ceftriaxone.    Cultures obtained at [facility] lab [phone] and pending.   currently on empiric Vanco and Cefepime, she had low grade fever and leukocytosis.   CT: Status post right occipital craniotomy. Subjacent low density extra axial   collection measuring 7 mm in greatest depth.  she had serous fluid leak from incision,--sutured  CSF culture from [facility] with CoNs--reported as per NSICU team --official report to follow  cultures here have been negative      PLAN:  cont IV Vanco for possible post-surgery meningitis,  f/u final cultures from [facility] and from here  serial CT as per neurosurgery  local skin care   reviewed with neurosx team

## 2018-04-21 NOTE — CONSULT NOTE ADULT - SUBJECTIVE AND OBJECTIVE BOX
CHIEF COMPLAINT:Patient is a 63y old  Female who presents with a chief complaint of AMS (17 Apr 2018 16:35)      HISTORY OF PRESENT ILLNESS:  this is 63  with vestibular schwanomma s/p R occipital crani now with meningitis on anbx  had episode of NSVT 2 days ago  No chest pain, dyspnea, palpitation, or dizziness.     PAST MEDICAL & SURGICAL HISTORY:  Schwannoma          MEDICATIONS:  enoxaparin Injectable 40 milliGRAM(s) SubCutaneous <User Schedule>  hydrALAZINE Injectable 10 milliGRAM(s) IV Push every 6 hours PRN  losartan 25 milliGRAM(s) Oral daily  metoprolol tartrate 25 milliGRAM(s) Oral two times a day    vancomycin  IVPB 1000 milliGRAM(s) IV Intermittent every 8 hours      acetaminophen   Tablet. 650 milliGRAM(s) Oral every 6 hours PRN  levETIRAcetam 1000 milliGRAM(s) Oral two times a day  ondansetron   Disintegrating Tablet 4 milliGRAM(s) Oral every 6 hours PRN  oxyCODONE    IR 10 milliGRAM(s) Oral every 6 hours PRN  oxyCODONE    IR 5 milliGRAM(s) Oral every 6 hours PRN        folic acid 1 milliGRAM(s) Oral daily  magnesium sulfate  IVPB 1 Gram(s) IV Intermittent daily  multivitamin 1 Tablet(s) Oral daily  nystatin Cream 1 Application(s) Topical two times a day  sodium chloride 1 Gram(s) Oral every 8 hours  thiamine 300 milliGRAM(s) Oral daily      FAMILY HISTORY:      Non-contributory    SOCIAL HISTORY:    No tobacco, drugs or etoh    Allergies    No Known Allergies    Intolerances    	    REVIEW OF SYSTEMS:  as above  The rest of the 14 points ROS reviewed and except above they are unremarkable.        PHYSICAL EXAM:  T(C): 37 (04-21-18 @ 03:00), Max: 37 (04-20-18 @ 19:00)  HR: 63 (04-21-18 @ 07:00) (63 - 85)  BP: 157/85 (04-21-18 @ 07:00) (123/92 - 166/97)  RR: 9 (04-21-18 @ 07:00) (9 - 20)  SpO2: 98% (04-21-18 @ 07:00) (96% - 99%)  Wt(kg): --  I&O's Summary    20 Apr 2018 07:01  -  21 Apr 2018 07:00  --------------------------------------------------------  IN: 800 mL / OUT: 1000 mL / NET: -200 mL    21 Apr 2018 07:01  -  21 Apr 2018 11:56  --------------------------------------------------------  IN: 175 mL / OUT: 0 mL / NET: 175 mL        JVP: Normal  Neck: supple  Lung: clear   CV: S1 S2 , Murmur:  Abd: soft  Ext: No edema  neuro: Awake / alert  Psych: flat affect  Skin: normal     LABS/DATA:    TELEMETRY: 	sinus     ECG:  	   	  CARDIAC MARKERS:  Troponin T, Serum: <0.01 ng/mL (04-20 @ 20:58)  Troponin T, Serum: <0.01 ng/mL (04-19 @ 20:24)  Troponin T, Serum: <0.01 ng/mL (04-19 @ 08:21)  Troponin T, Serum: <0.01 ng/mL (04-19 @ 01:31)  Troponin T, Serum: <0.01 ng/mL (04-18 @ 16:15)                                  12.4   6.1   )-----------( 300      ( 20 Apr 2018 20:58 )             34.3     04-20    138  |  100  |  7   ----------------------------<  117<H>  3.4<L>   |  24  |  0.73    Ca    9.3      20 Apr 2018 20:58  Phos  2.9     04-20  Mg     1.6     04-20      proBNP:   Lipid Profile:   HgA1c:   TSH:

## 2018-04-21 NOTE — CONSULT NOTE ADULT - ASSESSMENT
NSVT   keep electrolytes in normal range  no evidence of MI  obtain echo  agree with BB    meningitis   anbx  fu with ID    HTN  cont current meds

## 2018-04-21 NOTE — PROGRESS NOTE ADULT - SUBJECTIVE AND OBJECTIVE BOX
Patient seen and examined at bedside.    ICU Vital Signs Last 24 Hrs  T(C): 37 (21 Apr 2018 03:00), Max: 37 (20 Apr 2018 19:00)  T(F): 98.6 (21 Apr 2018 03:00), Max: 98.6 (20 Apr 2018 19:00)  HR: 66 (21 Apr 2018 03:00) (66 - 85)  BP: 123/92 (21 Apr 2018 03:00) (123/92 - 166/97)  BP(mean): 101 (21 Apr 2018 03:00) (93 - 117)  ABP: --  ABP(mean): --  RR: 10 (21 Apr 2018 03:00) (10 - 23)  SpO2: 99% (21 Apr 2018 03:00) (96% - 99%)    EXAM:    AOx3, Following Commands  CN: PERRL, EOMI, right facial palsy HB3  Motor: 5/5 throughout, no drift  Sensation intact to light touch  Reflexes: no clonus

## 2018-04-21 NOTE — PROGRESS NOTE ADULT - SUBJECTIVE AND OBJECTIVE BOX
63 year-old woman with history of breast cancer status post right lumpectomy, hypertension and recent right vestibular schwannoma resection (4/5/18) admitted with meningitis complicated by possible seizures.     EEG: negative for seizures.    ROS: No headache currently, no weakness, no shortness of breath or chest pain    Exam:  General: NAD  HEENT: PERRL  Neuro: Awake, alert, fully oriented, right facial droop, no drift, full strength  Heart: regular  Lungs: Clear  Abd: Soft, +BS  Ext: No c/c/e

## 2018-04-22 DIAGNOSIS — G44.52 NEW DAILY PERSISTENT HEADACHE (NDPH): ICD-10-CM

## 2018-04-22 DIAGNOSIS — G97.82 OTHER POSTPROCEDURAL COMPLICATIONS AND DISORDERS OF NERVOUS SYSTEM: ICD-10-CM

## 2018-04-22 DIAGNOSIS — R56.9 UNSPECIFIED CONVULSIONS: ICD-10-CM

## 2018-04-22 DIAGNOSIS — I47.2 VENTRICULAR TACHYCARDIA: ICD-10-CM

## 2018-04-22 DIAGNOSIS — F32.9 MAJOR DEPRESSIVE DISORDER, SINGLE EPISODE, UNSPECIFIED: ICD-10-CM

## 2018-04-22 DIAGNOSIS — I10 ESSENTIAL (PRIMARY) HYPERTENSION: ICD-10-CM

## 2018-04-22 LAB
ANION GAP SERPL CALC-SCNC: 10 MMOL/L — SIGNIFICANT CHANGE UP (ref 5–17)
BUN SERPL-MCNC: <4 MG/DL — LOW (ref 7–23)
CALCIUM SERPL-MCNC: 9.3 MG/DL — SIGNIFICANT CHANGE UP (ref 8.4–10.5)
CHLORIDE SERPL-SCNC: 97 MMOL/L — SIGNIFICANT CHANGE UP (ref 96–108)
CO2 SERPL-SCNC: 28 MMOL/L — SIGNIFICANT CHANGE UP (ref 22–31)
CREAT SERPL-MCNC: 0.61 MG/DL — SIGNIFICANT CHANGE UP (ref 0.5–1.3)
CULTURE RESULTS: SIGNIFICANT CHANGE UP
CULTURE RESULTS: SIGNIFICANT CHANGE UP
GLUCOSE SERPL-MCNC: 151 MG/DL — HIGH (ref 70–99)
HCT VFR BLD CALC: 32.4 % — LOW (ref 34.5–45)
HGB BLD-MCNC: 11.2 G/DL — LOW (ref 11.5–15.5)
MCHC RBC-ENTMCNC: 34.5 PG — HIGH (ref 27–34)
MCHC RBC-ENTMCNC: 34.6 GM/DL — SIGNIFICANT CHANGE UP (ref 32–36)
MCV RBC AUTO: 99.7 FL — SIGNIFICANT CHANGE UP (ref 80–100)
PLATELET # BLD AUTO: 198 K/UL — SIGNIFICANT CHANGE UP (ref 150–400)
POTASSIUM SERPL-MCNC: 3.4 MMOL/L — LOW (ref 3.5–5.3)
POTASSIUM SERPL-SCNC: 3.4 MMOL/L — LOW (ref 3.5–5.3)
RBC # BLD: 3.25 M/UL — LOW (ref 3.8–5.2)
RBC # FLD: 12.9 % — SIGNIFICANT CHANGE UP (ref 10.3–14.5)
SODIUM SERPL-SCNC: 135 MMOL/L — SIGNIFICANT CHANGE UP (ref 135–145)
SPECIMEN SOURCE: SIGNIFICANT CHANGE UP
SPECIMEN SOURCE: SIGNIFICANT CHANGE UP
WBC # BLD: 3.94 K/UL — SIGNIFICANT CHANGE UP (ref 3.8–10.5)
WBC # FLD AUTO: 3.94 K/UL — SIGNIFICANT CHANGE UP (ref 3.8–10.5)

## 2018-04-22 PROCEDURE — 99223 1ST HOSP IP/OBS HIGH 75: CPT

## 2018-04-22 PROCEDURE — 99232 SBSQ HOSP IP/OBS MODERATE 35: CPT

## 2018-04-22 RX ORDER — ACETAMINOPHEN 500 MG
1000 TABLET ORAL ONCE
Qty: 0 | Refills: 0 | Status: COMPLETED | OUTPATIENT
Start: 2018-04-22 | End: 2018-04-22

## 2018-04-22 RX ORDER — POTASSIUM CHLORIDE 20 MEQ
20 PACKET (EA) ORAL
Qty: 0 | Refills: 0 | Status: COMPLETED | OUTPATIENT
Start: 2018-04-22 | End: 2018-04-22

## 2018-04-22 RX ADMIN — Medication 400 MILLIGRAM(S): at 19:27

## 2018-04-22 RX ADMIN — Medication 20 MILLIEQUIVALENT(S): at 21:51

## 2018-04-22 RX ADMIN — Medication 1 TABLET(S): at 11:41

## 2018-04-22 RX ADMIN — OXYCODONE HYDROCHLORIDE 5 MILLIGRAM(S): 5 TABLET ORAL at 15:59

## 2018-04-22 RX ADMIN — HYDROMORPHONE HYDROCHLORIDE 0.5 MILLIGRAM(S): 2 INJECTION INTRAMUSCULAR; INTRAVENOUS; SUBCUTANEOUS at 06:44

## 2018-04-22 RX ADMIN — OXYCODONE HYDROCHLORIDE 10 MILLIGRAM(S): 5 TABLET ORAL at 23:33

## 2018-04-22 RX ADMIN — Medication 25 MILLIGRAM(S): at 17:46

## 2018-04-22 RX ADMIN — OXYCODONE HYDROCHLORIDE 5 MILLIGRAM(S): 5 TABLET ORAL at 16:29

## 2018-04-22 RX ADMIN — LEVETIRACETAM 1000 MILLIGRAM(S): 250 TABLET, FILM COATED ORAL at 17:45

## 2018-04-22 RX ADMIN — SODIUM CHLORIDE 1 GRAM(S): 9 INJECTION INTRAMUSCULAR; INTRAVENOUS; SUBCUTANEOUS at 21:51

## 2018-04-22 RX ADMIN — LEVETIRACETAM 1000 MILLIGRAM(S): 250 TABLET, FILM COATED ORAL at 05:34

## 2018-04-22 RX ADMIN — Medication 25 MILLIGRAM(S): at 05:32

## 2018-04-22 RX ADMIN — OXYCODONE HYDROCHLORIDE 10 MILLIGRAM(S): 5 TABLET ORAL at 05:31

## 2018-04-22 RX ADMIN — OXYCODONE HYDROCHLORIDE 10 MILLIGRAM(S): 5 TABLET ORAL at 11:39

## 2018-04-22 RX ADMIN — SODIUM CHLORIDE 1 GRAM(S): 9 INJECTION INTRAMUSCULAR; INTRAVENOUS; SUBCUTANEOUS at 15:59

## 2018-04-22 RX ADMIN — Medication 1000 MILLIGRAM(S): at 20:00

## 2018-04-22 RX ADMIN — Medication 20 MILLIEQUIVALENT(S): at 17:48

## 2018-04-22 RX ADMIN — OXYCODONE HYDROCHLORIDE 10 MILLIGRAM(S): 5 TABLET ORAL at 12:09

## 2018-04-22 RX ADMIN — SODIUM CHLORIDE 1 GRAM(S): 9 INJECTION INTRAMUSCULAR; INTRAVENOUS; SUBCUTANEOUS at 05:31

## 2018-04-22 RX ADMIN — ENOXAPARIN SODIUM 40 MILLIGRAM(S): 100 INJECTION SUBCUTANEOUS at 17:45

## 2018-04-22 RX ADMIN — LOSARTAN POTASSIUM 25 MILLIGRAM(S): 100 TABLET, FILM COATED ORAL at 05:34

## 2018-04-22 RX ADMIN — Medication 250 MILLIGRAM(S): at 21:56

## 2018-04-22 RX ADMIN — Medication 1 MILLIGRAM(S): at 11:40

## 2018-04-22 RX ADMIN — Medication 300 MILLIGRAM(S): at 11:42

## 2018-04-22 RX ADMIN — Medication 250 MILLIGRAM(S): at 05:32

## 2018-04-22 RX ADMIN — HYDROMORPHONE HYDROCHLORIDE 0.5 MILLIGRAM(S): 2 INJECTION INTRAMUSCULAR; INTRAVENOUS; SUBCUTANEOUS at 07:04

## 2018-04-22 RX ADMIN — Medication 250 MILLIGRAM(S): at 16:02

## 2018-04-22 RX ADMIN — Medication 100 GRAM(S): at 11:40

## 2018-04-22 RX ADMIN — OXYCODONE HYDROCHLORIDE 10 MILLIGRAM(S): 5 TABLET ORAL at 06:01

## 2018-04-22 NOTE — CONSULT NOTE ADULT - PROBLEM SELECTOR RECOMMENDATION 5
Per pharmacy, she is on Prozac 20mg PO daily (last filled in February). The patient did not mention that she takes it. Will need to clarify if she still takes it.

## 2018-04-22 NOTE — CONSULT NOTE ADULT - SUBJECTIVE AND OBJECTIVE BOX
Carlos Vega MD  ([facility] Hospitalist)  Page [phone]  (During off hours please page [phone])    *** INCOMPLETE. NOTE IN PROGRESS. ***    PMD:     HPI:   62 yo female POD 12 from right retrosig VS resection in ED transfer from [facility] after she was reportedly found down and confused by mother who called 911.  CSF showed , , glucose 98, protein 240. Febrile to 100.6, tachy to 156, 98% RA, 158/88.  Reportedly seized at OSH at was given 2 ativan.    vestibular schwanomma s/p R occipital crani   recent schwannoma resection, presented with AMS - possible meningitis, Seizures  meningitis- coag neg staph-  vancomycin  episode of NSVT 2 days ago      PAST MEDICAL & SURGICAL HISTORY:  Schwannoma    REVIEW OF SYMPTOMS:  CONSTITUTIONAL: No fever, weight loss, or fatigue  EYES: No eye pain, visual disturbances, or discharge  ENMT:  No difficulty hearing, tinnitus, vertigo; No sinus or throat pain  NECK: No pain or stiffness  BREASTS: No pain, masses, or nipple discharge  RESPIRATORY: No cough, wheezing, chills or hemoptysis; No shortness of breath  CARDIOVASCULAR: No chest pain, palpitations, dizziness, or leg swelling  GASTROINTESTINAL: No abdominal or epigastric pain. No nausea, vomiting, or hematemesis; No diarrhea or constipation. No melena or hematochezia.  GENITOURINARY: No dysuria, frequency, hematuria, or incontinence  NEUROLOGICAL: No headaches, memory loss, loss of strength, numbness, or tremors  SKIN: No itching, burning, rashes, or lesions   LYMPH NODES: No enlarged glands  ENDOCRINE: No heat or cold intolerance; No hair loss  MUSCULOSKELETAL: No joint pain or swelling; No muscle, back, or extremity pain  PSYCHIATRIC: No depression, anxiety, mood swings, or difficulty sleeping  HEME/LYMPH: No easy bruising, or bleeding gums  ALLERGIC AND IMMUNOLOGIC: No hives or eczema    ALLERGIES: NKDA    SOCIAL HISTORY:       FAMILY HISTORY:      HOME MEDICATIONS:        INPATIENT MEDICATIONS  (STANDING):  enoxaparin Injectable 40 milliGRAM(s) SubCutaneous <User Schedule>  folic acid 1 milliGRAM(s) Oral daily  levETIRAcetam 1000 milliGRAM(s) Oral two times a day  losartan 25 milliGRAM(s) Oral daily  metoprolol tartrate 25 milliGRAM(s) Oral two times a day  multivitamin 1 Tablet(s) Oral daily  nystatin Cream 1 Application(s) Topical two times a day  sodium chloride 1 Gram(s) Oral every 8 hours  thiamine 300 milliGRAM(s) Oral daily  vancomycin  IVPB 1000 milliGRAM(s) IV Intermittent every 8 hours    MEDICATIONS  (PRN):  acetaminophen   Tablet. 650 milliGRAM(s) Oral every 6 hours PRN Mild Pain (1 - 3)  hydrALAZINE Injectable 10 milliGRAM(s) IV Push every 3 hours PRN HTN  hydrALAZINE Injectable 10 milliGRAM(s) IV Push every 6 hours PRN SBP>160  ondansetron   Disintegrating Tablet 4 milliGRAM(s) Oral every 6 hours PRN Nausea  oxyCODONE    IR 10 milliGRAM(s) Oral every 6 hours PRN Severe Pain (7 - 10)  oxyCODONE    IR 5 milliGRAM(s) Oral every 6 hours PRN Moderate Pain (4 - 6)      Vital Signs Last 24 Hrs  T(C): 36.9 (22 Apr 2018 09:27), Max: 37.1 (21 Apr 2018 15:00)  T(F): 98.4 (22 Apr 2018 09:27), Max: 98.8 (21 Apr 2018 15:00)  HR: 67 (22 Apr 2018 09:27) (63 - 78)  BP: 146/89 (22 Apr 2018 09:27) (146/89 - 179/95)  BP(mean): 119 (21 Apr 2018 19:00) (118 - 119)  RR: 18 (22 Apr 2018 09:27) (9 - 23)  SpO2: 95% (22 Apr 2018 09:27) (95% - 100%)    I&O's Summary  21 Apr 2018 07:01  -  22 Apr 2018 07:00  --------------------------------------------------------  IN: 905 mL / OUT: 700 mL / NET: 205 mL        PHYSICAL EXAM:  GENERAL: NAD  HEENT:  Atraumatic, Normocephalic, MMM, neck supple, no JVD  EYES: EOMI, PERRLA, conjunctiva and sclera clear  CHEST/LUNG: Clear to auscultation bilaterally; No wheeze, rhonchi or rales.  HEART: S1, S2, rrr; No murmurs, rubs, or gallops  ABDOMEN: Soft, Nontender, Nondistended; Bowel sounds present  EXTREMITIES:  2+ Peripheral Pulses, No clubbing, cyanosis, or edema  PSYCH: calm  NEUROLOGY: non-focal, AOx3  SKIN: No rashes or lesions    LABS:                 11.2   3.94  )-----------( 198      ( 22 Apr 2018 08:24 )             32.4     04-22    135  |  97  |  <4<L>  ----------------------------<  151<H>  3.4<L>   |  28  |  0.61    Ca    9.3      22 Apr 2018 06:51  Phos  2.9     04-20  Mg     1.6     04-20    CARDIAC MARKERS ( 20 Apr 2018 20:58 )  x     / <0.01 ng/mL / 480 U/L / x     / 1.0 ng/mL      RADIOLOGY & ADDITIONAL TESTS:    Imaging Personally Reviewed:     ECG reviewed and interpreted:     Consultant(s) Notes Reviewed:      Care Discussed with Consultants/Other Providers: Carlos Vega MD  ([facility] Hospitalist)  Page [phone]  (During off hours please page [phone])    PMD: She cannot remember the name    HPI:   64 yo female with Right vestibular schwannoma s/p R occipital crani for resection (POD #12), was reportedly found down and confused by mother who called 911. Initially went to [facility], and ultimately transferred here for treatment of possible meningitis (coag neg staph) and seizures. The stay was complicated by an episode of NSVT 2 days ago. Clinically her mental status is improving on Vancomycin IV (per ID recommendations). The patient cannot really give a good history of the events. Currently only complaining of a headache, mostly right-sided, present around-the-clock, 9/10 severity, laying down helps as well as pain medications (oxycodone and tylenol). The patient also said that the right side of her tongue is numb since she "passed out on Tuesday." Consulted to help with management of meningitis.     PAST MEDICAL & SURGICAL HISTORY:  Right vestibular schwannoma s/p resection   Essential hypertension    REVIEW OF SYMPTOMS:  CONSTITUTIONAL: No fever  ENMT:  Right half of the tongue feels numb.  NECK: No pain or stiffness  RESPIRATORY: Mild cough. No wheezing, chills or hemoptysis; No shortness of breath  CARDIOVASCULAR: No chest pain, palpitations, dizziness, or leg swelling  GASTROINTESTINAL: No abdominal or epigastric pain. No nausea, vomiting, or hematemesis; States she has diarrhea (but once per day?). No melena or hematochezia.  GENITOURINARY: No dysuria, frequency, hematuria, or incontinence  NEUROLOGICAL: Right sided headaches s/p recent surgery. No memory loss, loss of strength, or tremors  SKIN: No itching, burning, rashes, or lesions   ENDOCRINE: No heat or cold intolerance; No hair loss  MUSCULOSKELETAL: No joint pain or swelling; No muscle, back, or extremity pain  PSYCHIATRIC: No depression, anxiety, mood swings, or difficulty sleeping  HEME/LYMPH: No easy bruising, or bleeding gums  ALLERGIC AND IMMUNOLOGIC: No hives or eczema    ALLERGIES: NKDA    SOCIAL HISTORY:   Single, lives with elderly mother (88 years old).  Quit smoking >20 years ago. Drinks wine occasionally. Denies illicit drug use.   Does not work currently.    FAMILY HISTORY:  No significant medical history in parents and siblings.     HOME MEDICATIONS (I called her [facility] in [place] [phone]):  Lisinopril 5 mg PO daily  Labetalol 300 mg PO BID  Prozac 20mg PO daily (last filled in February)     INPATIENT MEDICATIONS  (STANDING):  enoxaparin Injectable 40 milliGRAM(s) SubCutaneous <User Schedule>  folic acid 1 milliGRAM(s) Oral daily  levETIRAcetam 1000 milliGRAM(s) Oral two times a day  losartan 25 milliGRAM(s) Oral daily  metoprolol tartrate 25 milliGRAM(s) Oral two times a day  multivitamin 1 Tablet(s) Oral daily  nystatin Cream 1 Application(s) Topical two times a day  sodium chloride 1 Gram(s) Oral every 8 hours  thiamine 300 milliGRAM(s) Oral daily  vancomycin  IVPB 1000 milliGRAM(s) IV Intermittent every 8 hours    MEDICATIONS  (PRN):  acetaminophen   Tablet. 650 milliGRAM(s) Oral every 6 hours PRN Mild Pain (1 - 3)  hydrALAZINE Injectable 10 milliGRAM(s) IV Push every 3 hours PRN HTN  hydrALAZINE Injectable 10 milliGRAM(s) IV Push every 6 hours PRN SBP>160  ondansetron   Disintegrating Tablet 4 milliGRAM(s) Oral every 6 hours PRN Nausea  oxyCODONE    IR 10 milliGRAM(s) Oral every 6 hours PRN Severe Pain (7 - 10)  oxyCODONE    IR 5 milliGRAM(s) Oral every 6 hours PRN Moderate Pain (4 - 6)      Vital Signs Last 24 Hrs  T(C): 36.9 (22 Apr 2018 09:27), Max: 37.1 (21 Apr 2018 15:00)  T(F): 98.4 (22 Apr 2018 09:27), Max: 98.8 (21 Apr 2018 15:00)  HR: 67 (22 Apr 2018 09:27) (63 - 78)  BP: 146/89 (22 Apr 2018 09:27) (146/89 - 179/95)  BP(mean): 119 (21 Apr 2018 19:00) (118 - 119)  RR: 18 (22 Apr 2018 09:27) (9 - 23)  SpO2: 95% (22 Apr 2018 09:27) (95% - 100%)    I&O's Summary  21 Apr 2018 07:01 - 22 Apr 2018 07:00  --------------------------------------------------------  IN: 905 mL / OUT: 700 mL / NET: 205 mL      PHYSICAL EXAM:  GENERAL: NAD  HEENT: s/p right occipital crani, MMM, neck supple, no JVD  EYES: EOMI, PERRLA, conjunctiva and sclera clear  CHEST/LUNG: Clear to auscultation bilaterally; No wheeze, rhonchi or rales.  HEART: S1, S2, rrr; No murmurs, rubs, or gallops  ABDOMEN: Soft, Nontender, Nondistended; Bowel sounds present  EXTREMITIES:  2+ Peripheral Pulses, No clubbing, cyanosis, or edema  PSYCH: calm  NEUROLOGY: AOx3      LABS:                 11.2   3.94  )-----------( 198      ( 22 Apr 2018 08:24 )             32.4     04-22    135  |  97  |  <4<L>  ----------------------------<  151<H>  3.4<L>   |  28  |  0.61    Ca    9.3      22 Apr 2018 06:51  Phos  2.9     04-20  Mg     1.6     04-20    CARDIAC MARKERS ( 20 Apr 2018 20:58 )  x     / <0.01 ng/mL / 480 U/L / x     / 1.0 ng/mL      RADIOLOGY & ADDITIONAL TESTS:    Imaging Personally Reviewed:   CT head, CXR    Consultant(s) Notes Reviewed:  Neurosurgery    Care Discussed with Consultants/Other Providers: Neurosurgery re: plan of care

## 2018-04-22 NOTE — PROGRESS NOTE ADULT - ASSESSMENT
62 yo female POD 12 from right retrosig VS resection in ED transfer from [place] after she was reportedly found down and confused by mother who called 911.  Mother was not available at bedside at [facility] ED nor is she available via phone at this time. At [place], per report, CSF showed , , glucose 98, protein 240. Febrile to 100.6, tachy to 156, 98% RA, 158/88.  Reportedly seized at OSH at was given 2 ativan. Also received Vanco 1 gr + Ceftriaxone 2 gr IV once at [facility].   CSF and BCx, and UCx sent at [facility], confirmed with their lab - tel. [phone]  On arrival to [facility] patient was GCS 9.        PLAN:  - continue IV Vanco for possible post-surgery meningitis with coag neg staph,  - f/u final cultures from [facility] and from here  - serial CT 4/19 remains stable  - if repeat cultures remain negative would limit Vanco to 10-14 days total per ID  - Pt has PICC line  - ID following  - Vanco trough 18.5 (4/20)  - continue keppra  - oxycodone prn pain  - hyponatremia Na stable at 135- continue NaCl 1 gm q8  CV:  - HTN- SBP- 140-150  - continue losartan and metoprolol  - TTE- pending  - Cardiology following  DVT ppx:   - venodynes, chemoprophylaxis   PT/OT:  Home PT/OT      [employer] # [id number]

## 2018-04-22 NOTE — PROGRESS NOTE ADULT - SUBJECTIVE AND OBJECTIVE BOX
Subjective: Patient seen and examined. No new events except as noted.     SUBJECTIVE/ROS:  No chest pain, dyspnea, palpitation, or dizziness.       MEDICATIONS:  MEDICATIONS  (STANDING):  enoxaparin Injectable 40 milliGRAM(s) SubCutaneous <User Schedule>  folic acid 1 milliGRAM(s) Oral daily  levETIRAcetam 1000 milliGRAM(s) Oral two times a day  losartan 25 milliGRAM(s) Oral daily  metoprolol tartrate 25 milliGRAM(s) Oral two times a day  multivitamin 1 Tablet(s) Oral daily  nystatin Cream 1 Application(s) Topical two times a day  sodium chloride 1 Gram(s) Oral every 8 hours  thiamine 300 milliGRAM(s) Oral daily  vancomycin  IVPB 1000 milliGRAM(s) IV Intermittent every 8 hours      PHYSICAL EXAM:  T(C): 36.6 (04-22-18 @ 12:55), Max: 37.1 (04-21-18 @ 15:00)  HR: 73 (04-22-18 @ 12:55) (63 - 78)  BP: 153/98 (04-22-18 @ 12:55) (146/89 - 179/95)  RR: 18 (04-22-18 @ 12:55) (9 - 23)  SpO2: 95% (04-22-18 @ 12:55) (95% - 100%)  Wt(kg): --  I&O's Summary    21 Apr 2018 07:01  -  22 Apr 2018 07:00  --------------------------------------------------------  IN: 905 mL / OUT: 700 mL / NET: 205 mL          Appearance: Normal	  HEENT:   Normal oral mucosa, PERRL, EOMI	  Cardiovascular: Normal S1 S2,    Murmur:   Neck: JVP normal  Respiratory: Lungs clear to auscultation  Gastrointestinal:  Soft, Non-tender, + BS	  Skin: normal   Neuro: No gross deficits.   Psychiatry:  Mood & affect appropriate  Ext: No edema      LABS/DATA:    CARDIAC MARKERS:  CARDIAC MARKERS ( 20 Apr 2018 20:58 )  x     / <0.01 ng/mL / 480 U/L / x     / 1.0 ng/mL  CARDIAC MARKERS ( 19 Apr 2018 20:24 )  x     / <0.01 ng/mL / 448 U/L / x     / 1.9 ng/mL                                11.2   3.94  )-----------( 198      ( 22 Apr 2018 08:24 )             32.4     04-22    135  |  97  |  <4<L>  ----------------------------<  151<H>  3.4<L>   |  28  |  0.61    Ca    9.3      22 Apr 2018 06:51  Phos  2.9     04-20  Mg     1.6     04-20      proBNP:   Lipid Profile:   HgA1c:   TSH:     TELE:  EKG:

## 2018-04-22 NOTE — CONSULT NOTE ADULT - ASSESSMENT
63F with Right vestibular schwannoma s/p R occipital crani for resection (POD #12), was reportedly found down and confused by mother. Transferred from [facility] for treatment of possible meningitis (coag neg staph) and seizures.

## 2018-04-22 NOTE — PROGRESS NOTE ADULT - SUBJECTIVE AND OBJECTIVE BOX
SUBJECTIVE: Pt seen and examined, resting comfortably, reports intermittent right sided head pain    OVERNIGHT EVENTS: none    Vital Signs Last 24 Hrs  T(C): 36.6 (22 Apr 2018 12:55), Max: 36.9 (21 Apr 2018 20:15)  T(F): 97.9 (22 Apr 2018 12:55), Max: 98.4 (21 Apr 2018 20:15)  HR: 73 (22 Apr 2018 12:55) (67 - 78)  BP: 153/98 (22 Apr 2018 12:55) (146/89 - 179/95)  BP(mean): 119 (21 Apr 2018 19:00) (118 - 119)  RR: 18 (22 Apr 2018 12:55) (12 - 23)  SpO2: 95% (22 Apr 2018 12:55) (95% - 100%)    PHYSICAL EXAM:    General: No Acute Distress     Neurological: Awake, alert oriented to person, place and time, Following Commands, PERRL, right facial droop, Speech Fluent, Moving all extremities, Muscle Strength normal in all four extremities, No Drift, Sensation to Light Touch Intact    Pulmonary: Clear to Auscultation, No Rales, No Rhonchi, No Wheezes     Cardiovascular: S1, S2, Regular Rate and Rhythm     Gastrointestinal: Soft, Nontender, Nondistended     Incision: right retrosig sutures c/d/i, small collection at base of incision    LABS:                        11.2   3.94  )-----------( 198      ( 22 Apr 2018 08:24 )             32.4    04-22    135  |  97  |  <4<L>  ----------------------------<  151<H>  3.4<L>   |  28  |  0.61    Ca    9.3      22 Apr 2018 06:51  Phos  2.9     04-20  Mg     1.6     04-20 04-21 @ 07:01  -  04-22 @ 07:00  --------------------------------------------------------  IN: 905 mL / OUT: 700 mL / NET: 205 mL      DRAINS: none    MEDICATIONS:  Antibiotics:  vancomycin  IVPB 1000 milliGRAM(s) IV Intermittent every 8 hours    Neuro:  acetaminophen   Tablet. 650 milliGRAM(s) Oral every 6 hours PRN Mild Pain (1 - 3)  levETIRAcetam 1000 milliGRAM(s) Oral two times a day  ondansetron   Disintegrating Tablet 4 milliGRAM(s) Oral every 6 hours PRN Nausea  oxyCODONE    IR 10 milliGRAM(s) Oral every 6 hours PRN Severe Pain (7 - 10)  oxyCODONE    IR 5 milliGRAM(s) Oral every 6 hours PRN Moderate Pain (4 - 6)    Cardiac:  hydrALAZINE Injectable 10 milliGRAM(s) IV Push every 3 hours PRN HTN  hydrALAZINE Injectable 10 milliGRAM(s) IV Push every 6 hours PRN SBP>160  losartan 25 milliGRAM(s) Oral daily  metoprolol tartrate 25 milliGRAM(s) Oral two times a day    Pulm:    GI/:    Other:   enoxaparin Injectable 40 milliGRAM(s) SubCutaneous <User Schedule>  folic acid 1 milliGRAM(s) Oral daily  multivitamin 1 Tablet(s) Oral daily  nystatin Cream 1 Application(s) Topical two times a day  sodium chloride 1 Gram(s) Oral every 8 hours  thiamine 300 milliGRAM(s) Oral daily    DIET: [x] Regular [] CCD [] Renal [] Puree [] Dysphagia [] Tube Feeds:     IMAGING:   < from: CT Head No Cont (04.19.18 @ 08:29) >  FINDINGS:  A right occipital craniotomy with associated calvarial and soft tissue   changes are seen. Prominence of the right cerebellar convexity   extra-axial space is consistent with recent postoperative status. Lucency   along the right cerebellar hemisphere is slightly increased in  conspicuity without parenchymal hemorrhage.    Ventricles and sulci otherwise appear intact. There is otherwise no new   intracranial attenuation abnormality. There is no intraparenchymal   hematoma, mass effect or midline shift. No abnormal extra-axial fluid   collections are present. There is no evidence of acute transcortical   territorial infarction.    The calvarium is intact. The visualized intraorbital compartments,   paranasal sinuses and tympanomastoid cavities appear free of acute   disease.      IMPRESSION:  Stable exam.  No acute intracranial hemorrhage.    < end of copied text >

## 2018-04-22 NOTE — PROGRESS NOTE ADULT - ASSESSMENT
62 yo f hx R occipital crani for resection of vestibular schwanomma, POD#12, now transferred from [facility], after found unresponsive, confused, started on empiric Vanco and Ceftriaxone.    Cultures obtained at [facility] lab [phone] and pending.   currently on empiric Vanco and Cefepime, she had low grade fever and leukocytosis.   CT: Status post right occipital craniotomy. Subjacent low density extra axial   collection measuring 7 mm in greatest depth.  she had serous fluid leak from incision,--sutured  CSF culture from [facility] with CoNs--reported as per NSICU team --official report to follow  cultures here have been negative      PLAN:  cont IV Vanco for possible post-surgery meningitis with coag neg staph,  f/u final cultures from [facility] and from here  serial CT as per neurosurgery  local skin care   if repeat cultures remain negative would limit Vanco to 10-14 days total

## 2018-04-22 NOTE — PROGRESS NOTE ADULT - SUBJECTIVE AND OBJECTIVE BOX
CC: f/u for fever, AMS    Patient remains in ICU, currently non-toxic, no events overnight    REVIEW OF SYSTEMS: no fevers, no chills, no nausea, no vomiting, no abd pain, no resp symptoms  All other review of systems negative (Comprehensive ROS)    Antimicrobials Day #4  vancomycin  IVPB 1000 milliGRAM(s) IV Intermittent every 8 hours    Other Medications Reviewed    Vital Signs Last 24 Hrs  T(C): 37 (21 Apr 2018 03:00), Max: 37 (20 Apr 2018 19:00)  T(F): 98.6 (21 Apr 2018 03:00), Max: 98.6 (20 Apr 2018 19:00)  HR: 63 (21 Apr 2018 07:00) (63 - 85)  BP: 157/85 (21 Apr 2018 07:00) (123/92 - 166/97)  BP(mean): 107 (21 Apr 2018 07:00) (93 - 117)  RR: 9 (21 Apr 2018 07:00) (9 - 23)  SpO2: 98% (21 Apr 2018 07:00) (96% - 99%)    PHYSICAL EXAM:  General: alert, no acute distress  Eyes:  anicteric, no conjunctival injection, no discharge  Oropharynx: no lesions or injection 	  Neck: supple, without adenopathy  R lateral scalp incision with fluctuance, sutures and dressing in place, no warmth, redness or tenderness  Lungs: clear to auscultation  Heart: regular rate and rhythm; no murmur, rubs or gallops  Abdomen: soft, nondistended, nontender, without mass or organomegaly  Skin: no lesions  Extremities: no clubbing, cyanosis, or edema  Neurologic: alert, oriented, no focal deficits    LAB RESULTS:                        13.1   9.0   )-----------( 315      ( 19 Apr 2018 20:24 )             37.8   04-20    134<L>  |  99  |  7   ----------------------------<  106<H>  3.7   |  22  |  0.67    Ca    9.2      20 Apr 2018 04:36  Phos  3.9     04-20  Mg     1.9     04-20          MICROBIOLOGY REVIEWED:    RADIOLOGY REVIEWED: CC: f/u for fever, AMS    Patient remains non-toxic, no events overnight    REVIEW OF SYSTEMS: no fevers, no chills, no nausea, no vomiting, no abd pain, no resp symptoms  All other review of systems negative (Comprehensive ROS)    Antimicrobials Day #5  vancomycin  IVPB 1000 milliGRAM(s) IV Intermittent every 8 hours    Other Medications Reviewed    Vital Signs Last 24 Hrs  T(C): 37 (21 Apr 2018 03:00), Max: 37 (20 Apr 2018 19:00)  T(F): 98.6 (21 Apr 2018 03:00), Max: 98.6 (20 Apr 2018 19:00)  HR: 63 (21 Apr 2018 07:00) (63 - 85)  BP: 157/85 (21 Apr 2018 07:00) (123/92 - 166/97)  BP(mean): 107 (21 Apr 2018 07:00) (93 - 117)  RR: 9 (21 Apr 2018 07:00) (9 - 23)  SpO2: 98% (21 Apr 2018 07:00) (96% - 99%)    PHYSICAL EXAM:  General: alert, no acute distress  Eyes:  anicteric, no conjunctival injection, no discharge  Oropharynx: no lesions or injection 	  Neck: supple, without adenopathy  R lateral scalp incision with fluctuance, sutures and dressing in place, no warmth, redness or tenderness  Lungs: clear to auscultation  Heart: regular rate and rhythm; no murmur, rubs or gallops  Abdomen: soft, nondistended, nontender, without mass or organomegaly  Skin: no lesions  Extremities: no clubbing, cyanosis, or edema  Neurologic: alert, oriented, no focal deficits    LAB RESULTS:                        13.1   9.0   )-----------( 315      ( 19 Apr 2018 20:24 )             37.8   04-20    134<L>  |  99  |  7   ----------------------------<  106<H>  3.7   |  22  |  0.67    Ca    9.2      20 Apr 2018 04:36  Phos  3.9     04-20  Mg     1.9     04-20          MICROBIOLOGY REVIEWED:    RADIOLOGY REVIEWED:

## 2018-04-22 NOTE — CONSULT NOTE ADULT - PROBLEM/RECOMMENDATION-4
Patient is calling to request a work note. She underwent a marisa mastectomy 10/24/16 and recently returned to work at [employer] as a . 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 Letter mailed to patient 1/6/17.   DISPLAY PLAN FREE TEXT

## 2018-04-22 NOTE — CONSULT NOTE ADULT - PROBLEM SELECTOR RECOMMENDATION 4
At home she was on Lisinopril 5 mg PO daily and Labetalol 300 mg PO BID.  Here is taking Losartan 25 mg Po daily and metoprolol tartrate 25 mg PO BID.  Not well controlled.   Would d/c metoprolol and start labetalol 300mg PO BID.  Monitor BP.

## 2018-04-23 LAB
ANION GAP SERPL CALC-SCNC: 11 MMOL/L — SIGNIFICANT CHANGE UP (ref 5–17)
BUN SERPL-MCNC: 5 MG/DL — LOW (ref 7–23)
CALCIUM SERPL-MCNC: 9.7 MG/DL — SIGNIFICANT CHANGE UP (ref 8.4–10.5)
CHLORIDE SERPL-SCNC: 97 MMOL/L — SIGNIFICANT CHANGE UP (ref 96–108)
CO2 SERPL-SCNC: 28 MMOL/L — SIGNIFICANT CHANGE UP (ref 22–31)
CREAT SERPL-MCNC: 0.75 MG/DL — SIGNIFICANT CHANGE UP (ref 0.5–1.3)
GLUCOSE SERPL-MCNC: 110 MG/DL — HIGH (ref 70–99)
HCT VFR BLD CALC: 33.1 % — LOW (ref 34.5–45)
HGB BLD-MCNC: 11.4 G/DL — LOW (ref 11.5–15.5)
MCHC RBC-ENTMCNC: 34.4 GM/DL — SIGNIFICANT CHANGE UP (ref 32–36)
MCHC RBC-ENTMCNC: 34.5 PG — HIGH (ref 27–34)
MCV RBC AUTO: 100.3 FL — HIGH (ref 80–100)
PLATELET # BLD AUTO: 191 K/UL — SIGNIFICANT CHANGE UP (ref 150–400)
POTASSIUM SERPL-MCNC: 3.7 MMOL/L — SIGNIFICANT CHANGE UP (ref 3.5–5.3)
POTASSIUM SERPL-SCNC: 3.7 MMOL/L — SIGNIFICANT CHANGE UP (ref 3.5–5.3)
RBC # BLD: 3.3 M/UL — LOW (ref 3.8–5.2)
RBC # FLD: 13.1 % — SIGNIFICANT CHANGE UP (ref 10.3–14.5)
SODIUM SERPL-SCNC: 136 MMOL/L — SIGNIFICANT CHANGE UP (ref 135–145)
VANCOMYCIN TROUGH SERPL-MCNC: 24 UG/ML — HIGH (ref 10–20)
WBC # BLD: 4.41 K/UL — SIGNIFICANT CHANGE UP (ref 3.8–10.5)
WBC # FLD AUTO: 4.41 K/UL — SIGNIFICANT CHANGE UP (ref 3.8–10.5)

## 2018-04-23 PROCEDURE — 99233 SBSQ HOSP IP/OBS HIGH 50: CPT

## 2018-04-23 RX ORDER — LABETALOL HCL 100 MG
300 TABLET ORAL
Qty: 0 | Refills: 0 | Status: DISCONTINUED | OUTPATIENT
Start: 2018-04-23 | End: 2018-05-02

## 2018-04-23 RX ADMIN — Medication 25 MILLIGRAM(S): at 05:15

## 2018-04-23 RX ADMIN — LEVETIRACETAM 1000 MILLIGRAM(S): 250 TABLET, FILM COATED ORAL at 05:15

## 2018-04-23 RX ADMIN — Medication 250 MILLIGRAM(S): at 05:15

## 2018-04-23 RX ADMIN — LEVETIRACETAM 1000 MILLIGRAM(S): 250 TABLET, FILM COATED ORAL at 17:15

## 2018-04-23 RX ADMIN — Medication 650 MILLIGRAM(S): at 02:52

## 2018-04-23 RX ADMIN — Medication 300 MILLIGRAM(S): at 13:05

## 2018-04-23 RX ADMIN — OXYCODONE HYDROCHLORIDE 5 MILLIGRAM(S): 5 TABLET ORAL at 17:11

## 2018-04-23 RX ADMIN — Medication 650 MILLIGRAM(S): at 03:00

## 2018-04-23 RX ADMIN — Medication 250 MILLIGRAM(S): at 22:20

## 2018-04-23 RX ADMIN — Medication 650 MILLIGRAM(S): at 13:05

## 2018-04-23 RX ADMIN — SODIUM CHLORIDE 1 GRAM(S): 9 INJECTION INTRAMUSCULAR; INTRAVENOUS; SUBCUTANEOUS at 22:20

## 2018-04-23 RX ADMIN — Medication 300 MILLIGRAM(S): at 17:15

## 2018-04-23 RX ADMIN — SODIUM CHLORIDE 1 GRAM(S): 9 INJECTION INTRAMUSCULAR; INTRAVENOUS; SUBCUTANEOUS at 05:15

## 2018-04-23 RX ADMIN — OXYCODONE HYDROCHLORIDE 10 MILLIGRAM(S): 5 TABLET ORAL at 00:00

## 2018-04-23 RX ADMIN — Medication 650 MILLIGRAM(S): at 19:36

## 2018-04-23 RX ADMIN — NYSTATIN CREAM 1 APPLICATION(S): 100000 CREAM TOPICAL at 17:21

## 2018-04-23 RX ADMIN — Medication 1 TABLET(S): at 13:05

## 2018-04-23 RX ADMIN — LOSARTAN POTASSIUM 25 MILLIGRAM(S): 100 TABLET, FILM COATED ORAL at 05:15

## 2018-04-23 RX ADMIN — ENOXAPARIN SODIUM 40 MILLIGRAM(S): 100 INJECTION SUBCUTANEOUS at 17:15

## 2018-04-23 RX ADMIN — Medication 650 MILLIGRAM(S): at 20:00

## 2018-04-23 RX ADMIN — Medication 650 MILLIGRAM(S): at 01:13

## 2018-04-23 RX ADMIN — SODIUM CHLORIDE 1 GRAM(S): 9 INJECTION INTRAMUSCULAR; INTRAVENOUS; SUBCUTANEOUS at 13:21

## 2018-04-23 RX ADMIN — Medication 250 MILLIGRAM(S): at 13:21

## 2018-04-23 RX ADMIN — Medication 1 MILLIGRAM(S): at 13:05

## 2018-04-23 RX ADMIN — Medication 1000 MILLIGRAM(S): at 01:43

## 2018-04-23 RX ADMIN — OXYCODONE HYDROCHLORIDE 5 MILLIGRAM(S): 5 TABLET ORAL at 17:41

## 2018-04-23 NOTE — PROGRESS NOTE ADULT - ASSESSMENT
62 yo female POD 12 from right retrosig VS resection in ED transfer from [place] after she was reportedly found down and confused by mother who called 911.  Mother was not available at bedside at [facility] ED nor is she available via phone at this time. At [facility], per report, CSF showed , , glucose 98, protein 240. Febrile to 100.6, tachy to 156, 98% RA, 158/88.  Reportedly seized at OSH at was given 2 ativan. Started on  Vanco 1 gr + Ceftriaxone 2 gr  at [facility]. CSF cultures at OSH CONS. Bld cx neg at [facility]    Plan:  Neuro stable. On Keppra 1gm BID  HTN- On Losartan 26QD Labetalol 300BID   ID- On Vancomycin 1g q8. Vanco trough 18.5 4/20. vanco trough prior to next dose. Final ID recs to follow  DVT ppx 64 yo female POD 12 from right retrosig VS resection in ED transfer from [place] after she was reportedly found down and confused by mother who called 911.  Mother was not available at bedside at [facility] ED nor is she available via phone at this time. At [place], per report, CSF showed , , glucose 98, protein 240. Febrile to 100.6, tachy to 156, 98% RA, 158/88.  Reportedly seized at OSH at was given 2 ativan. Started on  Vanco 1 gr + Ceftriaxone 2 gr  at [facility]. CSF cultures at OSH CONS. Bld cx neg at [facility]    Plan:  Neuro stable. On Keppra 1gm BID  HTN- On Losartan 25QD Labetalol 300BID   ID- On Vancomycin 1g q8. Vanco trough 24.0 Dose adjusted  vanco trough prior to next dose. to complete 14 days of vanco. To complete on 5/2/18. Patient lacks cognitive ability and  social support for outpatient IV antibiotic administration .   Buttocks rash- Derm consult appreciated   DVT ppx

## 2018-04-23 NOTE — PROGRESS NOTE ADULT - SUBJECTIVE AND OBJECTIVE BOX
CC: f/u for post op infection    Patient reports no specific complaints although she is a little confused,slow to respond    REVIEW OF SYSTEMS:  All other review of systems negative (Comprehensive ROS)    Antimicrobials Day #  :day 6  vancomycin  IVPB 1000 milliGRAM(s) IV Intermittent every 8 hours    Other Medications Reviewed    T(F): 98.7 (04-23-18 @ 15:56), Max: 99.5 (04-22-18 @ 19:56)  HR: 85 (04-23-18 @ 15:56)  BP: 158/99 (04-23-18 @ 15:56)  RR: 18 (04-23-18 @ 15:56)  SpO2: 95% (04-23-18 @ 15:56)  Wt(kg): --    PHYSICAL EXAM:  General: alert, no acute distress, craniotomy incision clean  Eyes:  anicteric, no conjunctival injection, no discharge  Oropharynx: no lesions or injection 	  Neck: supple, without adenopathy  Lungs: clear to auscultation  Heart: regular rate and rhythm; no murmur, rubs or gallops  Abdomen: soft, nondistended, nontender, without mass or organomegaly  Skin: no lesions  Extremities: no clubbing, cyanosis, or edema  Neurologic: alert, oriented, moves all extremities    LAB RESULTS:                        11.4   4.41  )-----------( 191      ( 23 Apr 2018 06:26 )             33.1     04-23    136  |  97  |  5<L>  ----------------------------<  110<H>  3.7   |  28  |  0.75    Ca    9.7      23 Apr 2018 05:42          MICROBIOLOGY:  RECENT CULTURES:  4/17 blood cultures are negative    RADIOLOGY REVIEWED:    < from: CT Head No Cont (04.19.18 @ 08:29) >  IMPRESSION:  Stable exam.  No acute intracranial hemorrhage.

## 2018-04-23 NOTE — PROGRESS NOTE ADULT - ASSESSMENT
62 yo f hx R occipital crani for resection of vestibular schwanomma, POD#13, now transferred from [facility], after found unresponsive, confused, started on empiric Vanco and Ceftriaxone.    Cultures obtained at [facility] lab [phone] with coag negative staph in CSF  currently on empiric Vanco and Cefepime, she had low grade fever and leukocytosis.   CT: Status post right occipital craniotomy. Subjacent low density extra axial   collection measuring 7 mm in greatest depth.  she had serous fluid leak from incision,--sutured  CSF culture from [facility] with CoNs--reported as per NSICU team --official report to follow  cultures here have been negative      PLAN:  cont IV Vanco for possible post-surgery meningitis with coag neg staph,  Favor 2 weeks of vancomycin  serial CT as per neurosurgery  local skin care   if repeat cultures remain negative would limit Vanco to 14 days total

## 2018-04-23 NOTE — PROGRESS NOTE ADULT - SUBJECTIVE AND OBJECTIVE BOX
SUBJECTIVE:     OVERNIGHT EVENTS: none    Vital Signs Last 24 Hrs  T(C): 36.6 (23 Apr 2018 08:00), Max: 37.5 (22 Apr 2018 19:56)  T(F): 97.9 (23 Apr 2018 08:00), Max: 99.5 (22 Apr 2018 19:56)  HR: 74 (23 Apr 2018 08:00) (73 - 81)  BP: 156/89 (23 Apr 2018 08:00) (149/93 - 156/89)  BP(mean): --  RR: 18 (23 Apr 2018 08:00) (16 - 18)  SpO2: 95% (23 Apr 2018 08:00) (94% - 95%)    PHYSICAL EXAM:        Neurological: Awake, alert oriented to person, place and time, Following Commands, PERRL, right facial droop, Speech Fluent, Moving all extremities, Muscle Strength normal in all four extremities, No Drift, Sensation to Light Touch Intact    Pulmonary: Clear to Auscultation, No Rales, No Rhonchi, No Wheezes     Cardiovascular: S1, S2, Regular Rate and Rhythm     Gastrointestinal: Soft, Nontender, Nondistended     Incision: right retrosig sutures c/d/i, small collection at base of incision    LABS:                        11.4   4.41  )-----------( 191      ( 23 Apr 2018 06:26 )             33.1    04-23    136  |  97  |  5<L>  ----------------------------<  110<H>  3.7   |  28  |  0.75    Ca    9.7      23 Apr 2018 05:42            IMAGING:         MEDICATIONS:    vancomycin  IVPB 1000 milliGRAM(s) IV Intermittent every 8 hours  acetaminophen   Tablet. 650 milliGRAM(s) Oral every 6 hours PRN Mild Pain (1 - 3)  levETIRAcetam 1000 milliGRAM(s) Oral two times a day  ondansetron   Disintegrating Tablet 4 milliGRAM(s) Oral every 6 hours PRN Nausea  oxyCODONE    IR 10 milliGRAM(s) Oral every 6 hours PRN Severe Pain (7 - 10)  oxyCODONE    IR 5 milliGRAM(s) Oral every 6 hours PRN Moderate Pain (4 - 6)  hydrALAZINE Injectable 10 milliGRAM(s) IV Push every 6 hours PRN SBP>160  labetalol 300 milliGRAM(s) Oral two times a day  losartan 25 milliGRAM(s) Oral daily  enoxaparin Injectable 40 milliGRAM(s) SubCutaneous <User Schedule>  folic acid 1 milliGRAM(s) Oral daily  multivitamin 1 Tablet(s) Oral daily  nystatin Cream 1 Application(s) Topical two times a day  sodium chloride 1 Gram(s) Oral every 8 hours  thiamine 300 milliGRAM(s) Oral daily      DIET: SUBJECTIVE:     OVERNIGHT EVENTS: none    Vital Signs Last 24 Hrs  T(C): 36.6 (23 Apr 2018 08:00), Max: 37.5 (22 Apr 2018 19:56)  T(F): 97.9 (23 Apr 2018 08:00), Max: 99.5 (22 Apr 2018 19:56)  HR: 74 (23 Apr 2018 08:00) (73 - 81)  BP: 156/89 (23 Apr 2018 08:00) (149/93 - 156/89)  BP(mean): --  RR: 18 (23 Apr 2018 08:00) (16 - 18)  SpO2: 95% (23 Apr 2018 08:00) (94% - 95%)    PHYSICAL EXAM:      Neurological: Awake, alert oriented to person, place and time, Following Commands, PERRL, right facial droop, Speech Fluent, Moving all extremities, Muscle Strength normal in all four extremities, No Drift, Sensation to Light Touch Intact    Pulmonary: Clear to Auscultation, No Rales, No Rhonchi, No Wheezes     Cardiovascular: S1, S2, Regular Rate and Rhythm     Gastrointestinal: Soft, Nontender, Nondistended     Incision: right retrosig sutures c/d/i, small collection at base of incision- stable      LABS:                        11.4   4.41  )-----------( 191      ( 23 Apr 2018 06:26 )             33.1    04-23    136  |  97  |  5<L>  ----------------------------<  110<H>  3.7   |  28  |  0.75    Ca    9.7      23 Apr 2018 05:42            IMAGING:         MEDICATIONS:    vancomycin  IVPB 1000 milliGRAM(s) IV Intermittent every 12hrs  acetaminophen   Tablet. 650 milliGRAM(s) Oral every 6 hours PRN Mild Pain (1 - 3)  levETIRAcetam 1000 milliGRAM(s) Oral two times a day  ondansetron   Disintegrating Tablet 4 milliGRAM(s) Oral every 6 hours PRN Nausea  oxyCODONE    IR 10 milliGRAM(s) Oral every 6 hours PRN Severe Pain (7 - 10)  oxyCODONE    IR 5 milliGRAM(s) Oral every 6 hours PRN Moderate Pain (4 - 6)  hydrALAZINE Injectable 10 milliGRAM(s) IV Push every 6 hours PRN SBP>160  labetalol 300 milliGRAM(s) Oral two times a day  losartan 25 milliGRAM(s) Oral daily  enoxaparin Injectable 40 milliGRAM(s) SubCutaneous <User Schedule>  folic acid 1 milliGRAM(s) Oral daily  multivitamin 1 Tablet(s) Oral daily  nystatin Cream 1 Application(s) Topical two times a day  sodium chloride 1 Gram(s) Oral every 8 hours  thiamine 300 milliGRAM(s) Oral daily      DIET:

## 2018-04-23 NOTE — PROGRESS NOTE ADULT - SUBJECTIVE AND OBJECTIVE BOX
Authored by Dr Steven Blankenship [phone]    Patient is a 63y old  Female who presents with a chief complaint of AMS (17 Apr 2018 16:35)    SUBJECTIVE / OVERNIGHT EVENTS:    MEDICATIONS  (STANDING):  enoxaparin Injectable 40 milliGRAM(s) SubCutaneous <User Schedule>  folic acid 1 milliGRAM(s) Oral daily  levETIRAcetam 1000 milliGRAM(s) Oral two times a day  losartan 25 milliGRAM(s) Oral daily  metoprolol tartrate 25 milliGRAM(s) Oral two times a day  multivitamin 1 Tablet(s) Oral daily  nystatin Cream 1 Application(s) Topical two times a day  sodium chloride 1 Gram(s) Oral every 8 hours  thiamine 300 milliGRAM(s) Oral daily  vancomycin  IVPB 1000 milliGRAM(s) IV Intermittent every 8 hours    MEDICATIONS  (PRN):  acetaminophen   Tablet. 650 milliGRAM(s) Oral every 6 hours PRN Mild Pain (1 - 3)  hydrALAZINE Injectable 10 milliGRAM(s) IV Push every 3 hours PRN HTN  hydrALAZINE Injectable 10 milliGRAM(s) IV Push every 6 hours PRN SBP>160  ondansetron   Disintegrating Tablet 4 milliGRAM(s) Oral every 6 hours PRN Nausea  oxyCODONE    IR 10 milliGRAM(s) Oral every 6 hours PRN Severe Pain (7 - 10)  oxyCODONE    IR 5 milliGRAM(s) Oral every 6 hours PRN Moderate Pain (4 - 6)      Vital Signs Last 24 Hrs  T(C): 36.6 (23 Apr 2018 08:00), Max: 37.5 (22 Apr 2018 19:56)  T(F): 97.9 (23 Apr 2018 08:00), Max: 99.5 (22 Apr 2018 19:56)  HR: 74 (23 Apr 2018 08:00) (73 - 81)  BP: 156/89 (23 Apr 2018 08:00) (149/93 - 156/89)  BP(mean): --  RR: 18 (23 Apr 2018 08:00) (16 - 18)  SpO2: 95% (23 Apr 2018 08:00) (94% - 95%)  CAPILLARY BLOOD GLUCOSE        I&O's Summary    22 Apr 2018 07:01  -  23 Apr 2018 07:00  --------------------------------------------------------  IN: 590 mL / OUT: 0 mL / NET: 590 mL        PHYSICAL EXAM  GENERAL: NAD  HEENT: s/p right occipital crani, MMM, neck supple, no JVD  EYES: EOMI, PERRLA, conjunctiva and sclera clear  CHEST/LUNG: Clear to auscultation bilaterally; No wheeze, rhonchi or rales.  HEART: S1, S2, rrr; No murmurs, rubs, or gallops  ABDOMEN: Soft, Nontender, Nondistended; Bowel sounds present  EXTREMITIES:  2+ Peripheral Pulses, No clubbing, cyanosis, or edema  PSYCH: calm  NEUROLOGY: AOx3    LABS:                        11.4   4.41  )-----------( 191      ( 23 Apr 2018 06:26 )             33.1     04-23    136  |  97  |  5<L>  ----------------------------<  110<H>  3.7   |  28  |  0.75    Ca    9.7      23 Apr 2018 05:42      RADIOLOGY & ADDITIONAL TESTS:    Imaging Personally Reviewed:  Consultant(s) Notes Reviewed:  Cards, ID  Care Discussed with Consultants/Other Providers: Authored by Dr Steven Blankenship [phone]    Patient is a 63y old  Female who presents with a chief complaint of AMS (17 Apr 2018 16:35)    SUBJECTIVE / OVERNIGHT EVENTS: persistent HA    MEDICATIONS  (STANDING):  enoxaparin Injectable 40 milliGRAM(s) SubCutaneous <User Schedule>  folic acid 1 milliGRAM(s) Oral daily  levETIRAcetam 1000 milliGRAM(s) Oral two times a day  losartan 25 milliGRAM(s) Oral daily  metoprolol tartrate 25 milliGRAM(s) Oral two times a day  multivitamin 1 Tablet(s) Oral daily  nystatin Cream 1 Application(s) Topical two times a day  sodium chloride 1 Gram(s) Oral every 8 hours  thiamine 300 milliGRAM(s) Oral daily  vancomycin  IVPB 1000 milliGRAM(s) IV Intermittent every 8 hours    MEDICATIONS  (PRN):  acetaminophen   Tablet. 650 milliGRAM(s) Oral every 6 hours PRN Mild Pain (1 - 3)  hydrALAZINE Injectable 10 milliGRAM(s) IV Push every 3 hours PRN HTN  hydrALAZINE Injectable 10 milliGRAM(s) IV Push every 6 hours PRN SBP>160  ondansetron   Disintegrating Tablet 4 milliGRAM(s) Oral every 6 hours PRN Nausea  oxyCODONE    IR 10 milliGRAM(s) Oral every 6 hours PRN Severe Pain (7 - 10)  oxyCODONE    IR 5 milliGRAM(s) Oral every 6 hours PRN Moderate Pain (4 - 6)      Vital Signs Last 24 Hrs  T(C): 36.6 (23 Apr 2018 08:00), Max: 37.5 (22 Apr 2018 19:56)  T(F): 97.9 (23 Apr 2018 08:00), Max: 99.5 (22 Apr 2018 19:56)  HR: 74 (23 Apr 2018 08:00) (73 - 81)  BP: 156/89 (23 Apr 2018 08:00) (149/93 - 156/89)  BP(mean): --  RR: 18 (23 Apr 2018 08:00) (16 - 18)  SpO2: 95% (23 Apr 2018 08:00) (94% - 95%)  CAPILLARY BLOOD GLUCOSE        I&O's Summary    22 Apr 2018 07:01  -  23 Apr 2018 07:00  --------------------------------------------------------  IN: 590 mL / OUT: 0 mL / NET: 590 mL        PHYSICAL EXAM  GENERAL: NAD  HEENT: s/p right occipital crani, MMM, neck supple, no JVD  EYES: EOMI, PERRLA, conjunctiva and sclera clear  CHEST/LUNG: Clear to auscultation bilaterally; No wheeze, rhonchi or rales.  HEART: S1, S2, rrr; No murmurs, rubs, or gallops  ABDOMEN: Soft, Nontender, Nondistended; Bowel sounds present  EXTREMITIES:  2+ Peripheral Pulses, No clubbing, cyanosis, or edema  PSYCH: calm  NEUROLOGY: AOx3    LABS:                        11.4   4.41  )-----------( 191      ( 23 Apr 2018 06:26 )             33.1     04-23    136  |  97  |  5<L>  ----------------------------<  110<H>  3.7   |  28  |  0.75    Ca    9.7      23 Apr 2018 05:42      RADIOLOGY & ADDITIONAL TESTS:    Imaging Personally Reviewed:  Consultant(s) Notes Reviewed:  Cards, ID  Care Discussed with Consultants/Other Providers:

## 2018-04-23 NOTE — PROGRESS NOTE ADULT - PROBLEM SELECTOR PLAN 1
CoNS - await final cultures - anticipate ~1 wk remaining of vanco if negative  f/u ID. monitor vanc trough

## 2018-04-23 NOTE — PROGRESS NOTE ADULT - ASSESSMENT
63F with Right vestibular schwannoma s/p R occipital crani for resection (POD #12), was reportedly found down and confused by mother. Transferred from [facility] for treatment of possible meningitis (coag neg staph) and seizures.

## 2018-04-23 NOTE — PROGRESS NOTE ADULT - SUBJECTIVE AND OBJECTIVE BOX
Subjective: Patient seen and examined. No new events except as noted.     SUBJECTIVE/ROS:  No chest pain, dyspnea, palpitation, or dizziness.       MEDICATIONS:  MEDICATIONS  (STANDING):  enoxaparin Injectable 40 milliGRAM(s) SubCutaneous <User Schedule>  folic acid 1 milliGRAM(s) Oral daily  levETIRAcetam 1000 milliGRAM(s) Oral two times a day  losartan 25 milliGRAM(s) Oral daily  metoprolol tartrate 25 milliGRAM(s) Oral two times a day  multivitamin 1 Tablet(s) Oral daily  nystatin Cream 1 Application(s) Topical two times a day  sodium chloride 1 Gram(s) Oral every 8 hours  thiamine 300 milliGRAM(s) Oral daily  vancomycin  IVPB 1000 milliGRAM(s) IV Intermittent every 8 hours      PHYSICAL EXAM:  T(C): 36.6 (04-23-18 @ 08:00), Max: 37.5 (04-22-18 @ 19:56)  HR: 74 (04-23-18 @ 08:00) (73 - 81)  BP: 156/89 (04-23-18 @ 08:00) (149/93 - 156/89)  RR: 18 (04-23-18 @ 08:00) (16 - 18)  SpO2: 95% (04-23-18 @ 08:00) (94% - 95%)  Wt(kg): --  I&O's Summary    22 Apr 2018 07:01  -  23 Apr 2018 07:00  --------------------------------------------------------  IN: 590 mL / OUT: 0 mL / NET: 590 mL          Appearance: Normal	  HEENT:   Normal oral mucosa, PERRL, EOMI	  Cardiovascular: Normal S1 S2,    Murmur:   Neck: JVP normal  Respiratory: Lungs clear to auscultation  Gastrointestinal:  Soft, Non-tender, + BS	  Skin: normal   Neuro: No gross deficits.   Psychiatry:  Mood & affect appropriate  Ext: No edema      LABS/DATA:    CARDIAC MARKERS:  CARDIAC MARKERS ( 20 Apr 2018 20:58 )  x     / <0.01 ng/mL / 480 U/L / x     / 1.0 ng/mL                                11.4   4.41  )-----------( 191      ( 23 Apr 2018 06:26 )             33.1     04-23    136  |  97  |  5<L>  ----------------------------<  110<H>  3.7   |  28  |  0.75    Ca    9.7      23 Apr 2018 05:42      proBNP:   Lipid Profile:   HgA1c:   TSH:     TELE:  EKG:

## 2018-04-24 DIAGNOSIS — R21 RASH AND OTHER NONSPECIFIC SKIN ERUPTION: ICD-10-CM

## 2018-04-24 PROCEDURE — 99233 SBSQ HOSP IP/OBS HIGH 50: CPT

## 2018-04-24 RX ORDER — ACETAMINOPHEN 500 MG
1000 TABLET ORAL ONCE
Qty: 0 | Refills: 0 | Status: COMPLETED | OUTPATIENT
Start: 2018-04-24 | End: 2018-04-24

## 2018-04-24 RX ORDER — PETROLATUM,WHITE
1 JELLY (GRAM) TOPICAL
Qty: 0 | Refills: 0 | Status: DISCONTINUED | OUTPATIENT
Start: 2018-04-24 | End: 2018-04-26

## 2018-04-24 RX ORDER — VANCOMYCIN HCL 1 G
1000 VIAL (EA) INTRAVENOUS EVERY 12 HOURS
Qty: 0 | Refills: 0 | Status: DISCONTINUED | OUTPATIENT
Start: 2018-04-24 | End: 2018-05-01

## 2018-04-24 RX ADMIN — ENOXAPARIN SODIUM 40 MILLIGRAM(S): 100 INJECTION SUBCUTANEOUS at 17:29

## 2018-04-24 RX ADMIN — Medication 1 TABLET(S): at 11:32

## 2018-04-24 RX ADMIN — LOSARTAN POTASSIUM 25 MILLIGRAM(S): 100 TABLET, FILM COATED ORAL at 05:17

## 2018-04-24 RX ADMIN — Medication 650 MILLIGRAM(S): at 17:31

## 2018-04-24 RX ADMIN — LEVETIRACETAM 1000 MILLIGRAM(S): 250 TABLET, FILM COATED ORAL at 17:29

## 2018-04-24 RX ADMIN — Medication 300 MILLIGRAM(S): at 05:17

## 2018-04-24 RX ADMIN — Medication 300 MILLIGRAM(S): at 11:32

## 2018-04-24 RX ADMIN — OXYCODONE HYDROCHLORIDE 5 MILLIGRAM(S): 5 TABLET ORAL at 19:12

## 2018-04-24 RX ADMIN — Medication 650 MILLIGRAM(S): at 16:31

## 2018-04-24 RX ADMIN — SODIUM CHLORIDE 1 GRAM(S): 9 INJECTION INTRAMUSCULAR; INTRAVENOUS; SUBCUTANEOUS at 22:13

## 2018-04-24 RX ADMIN — SODIUM CHLORIDE 1 GRAM(S): 9 INJECTION INTRAMUSCULAR; INTRAVENOUS; SUBCUTANEOUS at 05:17

## 2018-04-24 RX ADMIN — OXYCODONE HYDROCHLORIDE 5 MILLIGRAM(S): 5 TABLET ORAL at 23:00

## 2018-04-24 RX ADMIN — OXYCODONE HYDROCHLORIDE 5 MILLIGRAM(S): 5 TABLET ORAL at 22:13

## 2018-04-24 RX ADMIN — Medication 650 MILLIGRAM(S): at 10:18

## 2018-04-24 RX ADMIN — Medication 250 MILLIGRAM(S): at 22:13

## 2018-04-24 RX ADMIN — SODIUM CHLORIDE 1 GRAM(S): 9 INJECTION INTRAMUSCULAR; INTRAVENOUS; SUBCUTANEOUS at 14:58

## 2018-04-24 RX ADMIN — Medication 650 MILLIGRAM(S): at 11:00

## 2018-04-24 RX ADMIN — NYSTATIN CREAM 1 APPLICATION(S): 100000 CREAM TOPICAL at 05:19

## 2018-04-24 RX ADMIN — Medication 300 MILLIGRAM(S): at 17:29

## 2018-04-24 RX ADMIN — Medication 1 MILLIGRAM(S): at 11:32

## 2018-04-24 RX ADMIN — OXYCODONE HYDROCHLORIDE 5 MILLIGRAM(S): 5 TABLET ORAL at 05:49

## 2018-04-24 RX ADMIN — Medication 400 MILLIGRAM(S): at 01:13

## 2018-04-24 RX ADMIN — LEVETIRACETAM 1000 MILLIGRAM(S): 250 TABLET, FILM COATED ORAL at 05:17

## 2018-04-24 RX ADMIN — OXYCODONE HYDROCHLORIDE 5 MILLIGRAM(S): 5 TABLET ORAL at 05:19

## 2018-04-24 RX ADMIN — OXYCODONE HYDROCHLORIDE 5 MILLIGRAM(S): 5 TABLET ORAL at 18:42

## 2018-04-24 RX ADMIN — NYSTATIN CREAM 1 APPLICATION(S): 100000 CREAM TOPICAL at 17:29

## 2018-04-24 RX ADMIN — Medication 250 MILLIGRAM(S): at 10:20

## 2018-04-24 NOTE — PROGRESS NOTE ADULT - PROBLEM SELECTOR PLAN 1
CoNS - anticipate ~14d total vanco course as per ID  monitor vanc trough - last one was supratherapeutic - vanc dose now 1g Q 12 - f/u repeat trough pre 4th dose

## 2018-04-24 NOTE — PROGRESS NOTE ADULT - ASSESSMENT
62 yo f hx R occipital crani for resection of vestibular schwanomma, POD#13, now transferred from [facility], after found unresponsive, confused, started on empiric Vanco and Ceftriaxone.    Cultures obtained at [facility] lab [phone] with coag negative staph in CSF  currently on empiric Vanco and Cefepime, she had low grade fever and leukocytosis.   CT: Status post right occipital craniotomy. Subjacent low density extra axial   collection measuring 7 mm in greatest depth.  she had serous fluid leak from incision,--sutured  CSF culture from [facility] with CoNs--reported as per NSICU team --official report to follow  cultures here have been negative      PLAN:  cont IV Vanco for possible post-surgery meningitis with coag neg staph,  serial CT as per neurosurgery  local skin care   if repeat cultures remain negative would limit Vanco to 14 days total, day 7/14  monitor level and adjust accordingly, changed to 1g IV q12 now, renal function remains stable

## 2018-04-24 NOTE — PROGRESS NOTE ADULT - SUBJECTIVE AND OBJECTIVE BOX
Authored by Dr Steven Blankenship [phone]    Patient is a 63y old  Female who presents with a chief complaint of AMS (17 Apr 2018 16:35)    SUBJECTIVE / OVERNIGHT EVENTS: rash on abdomen and groin    MEDICATIONS  (STANDING):  enoxaparin Injectable 40 milliGRAM(s) SubCutaneous <User Schedule>  folic acid 1 milliGRAM(s) Oral daily  labetalol 300 milliGRAM(s) Oral two times a day  levETIRAcetam 1000 milliGRAM(s) Oral two times a day  losartan 25 milliGRAM(s) Oral daily  multivitamin 1 Tablet(s) Oral daily  nystatin Cream 1 Application(s) Topical two times a day  sodium chloride 1 Gram(s) Oral every 8 hours  thiamine 300 milliGRAM(s) Oral daily  vancomycin  IVPB 1000 milliGRAM(s) IV Intermittent every 12 hours    MEDICATIONS  (PRN):  acetaminophen   Tablet. 650 milliGRAM(s) Oral every 6 hours PRN Mild Pain (1 - 3)  hydrALAZINE Injectable 10 milliGRAM(s) IV Push every 6 hours PRN SBP>160  ondansetron   Disintegrating Tablet 4 milliGRAM(s) Oral every 6 hours PRN Nausea  oxyCODONE    IR 5 milliGRAM(s) Oral every 6 hours PRN Moderate Pain (4 - 6)      Vital Signs Last 24 Hrs  T(C): 36.8 (24 Apr 2018 05:00), Max: 37.1 (23 Apr 2018 15:56)  T(F): 98.2 (24 Apr 2018 05:00), Max: 98.7 (23 Apr 2018 15:56)  HR: 80 (24 Apr 2018 05:00) (80 - 100)  BP: 143/88 (24 Apr 2018 05:00) (108/71 - 168/98)  BP(mean): --  RR: 16 (24 Apr 2018 05:00) (16 - 18)  SpO2: 94% (24 Apr 2018 05:00) (94% - 98%)  CAPILLARY BLOOD GLUCOSE        I&O's Summary    23 Apr 2018 07:01  -  24 Apr 2018 07:00  --------------------------------------------------------  IN: 1550 mL / OUT: 0 mL / NET: 1550 mL        PHYSICAL EXAM  GENERAL: NAD  HEENT: s/p right occipital crani, MMM, neck supple, no JVD  EYES: EOMI, PERRLA, conjunctiva and sclera clear  CHEST/LUNG: Clear to auscultation bilaterally; No wheeze, rhonchi or rales.  HEART: S1, S2, rrr; No murmurs, rubs, or gallops  ABDOMEN: Soft, Nontender, Nondistended; Bowel sounds present  EXTREMITIES:  2+ Peripheral Pulses, No clubbing, cyanosis, or edema  PSYCH: calm, limited affect  NEUROLOGY: AOx3    LABS:                        11.4   4.41  )-----------( 191      ( 23 Apr 2018 06:26 )             33.1     04-23    136  |  97  |  5<L>  ----------------------------<  110<H>  3.7   |  28  |  0.75    Ca    9.7      23 Apr 2018 05:42      Vancomycin Level, Trough: 24.0 ug/mL (04.23.18 @ 22:42)        RADIOLOGY & ADDITIONAL TESTS:    Imaging Personally Reviewed:  Consultant(s) Notes Reviewed:    Care Discussed with Consultants/Other Providers: Authored by Dr Steven Blankenship [phone]    Patient is a 63y old  Female who presents with a chief complaint of AMS (17 Apr 2018 16:35)    SUBJECTIVE / OVERNIGHT EVENTS: rash on back and groin - mildly itchy, pt says she gets it freq at home "when [she] doesn't wear underwear" no complaints currently. has mild ha, controlled w/tylenol    MEDICATIONS  (STANDING):  enoxaparin Injectable 40 milliGRAM(s) SubCutaneous <User Schedule>  folic acid 1 milliGRAM(s) Oral daily  labetalol 300 milliGRAM(s) Oral two times a day  levETIRAcetam 1000 milliGRAM(s) Oral two times a day  losartan 25 milliGRAM(s) Oral daily  multivitamin 1 Tablet(s) Oral daily  nystatin Cream 1 Application(s) Topical two times a day  sodium chloride 1 Gram(s) Oral every 8 hours  thiamine 300 milliGRAM(s) Oral daily  vancomycin  IVPB 1000 milliGRAM(s) IV Intermittent every 12 hours    MEDICATIONS  (PRN):  acetaminophen   Tablet. 650 milliGRAM(s) Oral every 6 hours PRN Mild Pain (1 - 3)  hydrALAZINE Injectable 10 milliGRAM(s) IV Push every 6 hours PRN SBP>160  ondansetron   Disintegrating Tablet 4 milliGRAM(s) Oral every 6 hours PRN Nausea  oxyCODONE    IR 5 milliGRAM(s) Oral every 6 hours PRN Moderate Pain (4 - 6)      Vital Signs Last 24 Hrs  T(C): 36.8 (24 Apr 2018 05:00), Max: 37.1 (23 Apr 2018 15:56)  T(F): 98.2 (24 Apr 2018 05:00), Max: 98.7 (23 Apr 2018 15:56)  HR: 80 (24 Apr 2018 05:00) (80 - 100)  BP: 143/88 (24 Apr 2018 05:00) (108/71 - 168/98)  BP(mean): --  RR: 16 (24 Apr 2018 05:00) (16 - 18)  SpO2: 94% (24 Apr 2018 05:00) (94% - 98%)  CAPILLARY BLOOD GLUCOSE        I&O's Summary    23 Apr 2018 07:01  -  24 Apr 2018 07:00  --------------------------------------------------------  IN: 1550 mL / OUT: 0 mL / NET: 1550 mL        PHYSICAL EXAM  GENERAL: NAD  HEENT: s/p right occipital crani, MMM, neck supple, no JVD  EYES: EOMI, PERRLA, conjunctiva and sclera clear  CHEST/LUNG: Clear to auscultation bilaterally; No wheeze, rhonchi or rales.  HEART: S1, S2, rrr; No murmurs, rubs, or gallops  ABDOMEN: Soft, Nontender, Nondistended; Bowel sounds present  EXTREMITIES:  2+ Peripheral Pulses, No clubbing, cyanosis, or edema  PSYCH: calm, limited affect  NEUROLOGY: AOx3  SKIN: small discrete erythematous papules crossing midline lower back and buttock, less severe in front genital area (RN May present to chaperone exam)    LABS:                        11.4   4.41  )-----------( 191      ( 23 Apr 2018 06:26 )             33.1     04-23    136  |  97  |  5<L>  ----------------------------<  110<H>  3.7   |  28  |  0.75    Ca    9.7      23 Apr 2018 05:42      Vancomycin Level, Trough: 24.0 ug/mL (04.23.18 @ 22:42)        RADIOLOGY & ADDITIONAL TESTS:    Imaging Personally Reviewed:  Consultant(s) Notes Reviewed:    Care Discussed with Consultants/Other Providers:

## 2018-04-24 NOTE — PROGRESS NOTE ADULT - SUBJECTIVE AND OBJECTIVE BOX
SUBJECTIVE:     OVERNIGHT EVENTS: none    Vital Signs Last 24 Hrs  T(C): 37.1 (24 Apr 2018 11:00), Max: 37.1 (23 Apr 2018 15:56)  T(F): 98.8 (24 Apr 2018 11:00), Max: 98.8 (24 Apr 2018 11:00)  HR: 68 (24 Apr 2018 11:00) (68 - 100)  BP: 136/75 (24 Apr 2018 11:00) (108/71 - 158/99)  BP(mean): --  RR: 18 (24 Apr 2018 11:00) (16 - 18)  SpO2: 97% (24 Apr 2018 11:00) (94% - 98%)    PHYSICAL EXAM:    Constitutional: No Acute Distress     Neurological: AOx3, Following Commands, Moving all Extremities     Motor exam:          Upper extremity                         Delt     Bicep     Tricep    HG                                                 R         5/5        5/5        5/5       5/5                                               L          5/5        5/5        5/5       5/5          Lower extremity                        HF         KF        KE       DF         PF                                                  R        5/5        5/5        5/5       5/5         5/5                                               L         5/5        5/5       5/5       5/5          5/5                                                 Sensation: [] intact to light touch  [] decreased:     Pulmonary: Clear to Auscultation, No rales, No rhonchi, No wheezes     Cardiovascular: S1, S2, Regular rate and rhythm     Gastrointestinal: Soft, Non-tender, Non-distended     Extremities: No calf tenderness     Incision:   DRAINS:     LABS:                        11.4   4.41  )-----------( 191      ( 23 Apr 2018 06:26 )             33.1    04-23    136  |  97  |  5<L>  ----------------------------<  110<H>  3.7   |  28  |  0.75    Ca    9.7      23 Apr 2018 05:42        04-23 @ 07:01 - 04-24 @ 07:00  --------------------------------------------------------  IN: 1550 mL / OUT: 0 mL / NET: 1550 mL    04-24 @ 07:01 - 04-24 @ 15:32  --------------------------------------------------------  IN: 480 mL / OUT: 0 mL / NET: 480 mL      IMAGING:         MEDICATIONS:    vancomycin  IVPB 1000 milliGRAM(s) IV Intermittent every 12 hours  acetaminophen   Tablet. 650 milliGRAM(s) Oral every 6 hours PRN Mild Pain (1 - 3)  levETIRAcetam 1000 milliGRAM(s) Oral two times a day  ondansetron   Disintegrating Tablet 4 milliGRAM(s) Oral every 6 hours PRN Nausea  oxyCODONE    IR 5 milliGRAM(s) Oral every 6 hours PRN Moderate Pain (4 - 6)  hydrALAZINE Injectable 10 milliGRAM(s) IV Push every 6 hours PRN SBP>160  labetalol 300 milliGRAM(s) Oral two times a day  losartan 25 milliGRAM(s) Oral daily  AQUAPHOR (petrolatum Ointment) 1 Application(s) Topical two times a day  enoxaparin Injectable 40 milliGRAM(s) SubCutaneous <User Schedule>  folic acid 1 milliGRAM(s) Oral daily  multivitamin 1 Tablet(s) Oral daily  nystatin Cream 1 Application(s) Topical two times a day  sodium chloride 1 Gram(s) Oral every 8 hours  thiamine 300 milliGRAM(s) Oral daily      DIET:

## 2018-04-24 NOTE — PROGRESS NOTE ADULT - SUBJECTIVE AND OBJECTIVE BOX
CC: f/u for post op infection    Patient reports no specific complaints although she is a little confused,slow to respond    REVIEW OF SYSTEMS:  All other review of systems negative (Comprehensive ROS)    Antimicrobials Day #  :day 7    vancomycin  IVPB 1000 milliGRAM(s) IV Intermittent every 12 hours    Other Medications Reviewed    Vital Signs Last 24 Hrs  T(C): 37.1 (24 Apr 2018 11:00), Max: 37.1 (24 Apr 2018 08:00)  T(F): 98.8 (24 Apr 2018 11:00), Max: 98.8 (24 Apr 2018 11:00)  HR: 68 (24 Apr 2018 11:00) (68 - 100)  BP: 136/75 (24 Apr 2018 11:00) (108/71 - 143/88)  BP(mean): --  RR: 18 (24 Apr 2018 11:00) (16 - 18)  SpO2: 97% (24 Apr 2018 11:00) (94% - 98%)    PHYSICAL EXAM:  General: alert, no acute distress, craniotomy incision clean  Eyes:  anicteric, no conjunctival injection, no discharge  Oropharynx: no lesions or injection 	  Neck: supple, without adenopathy  Lungs: clear to auscultation  Heart: regular rate and rhythm; no murmur, rubs or gallops  Abdomen: soft, nondistended, nontender, without mass or organomegaly  Skin: no lesions  Extremities: no clubbing, cyanosis, or edema  Neurologic: alert, oriented, moves all extremities    LAB RESULTS:                                   11.4   4.41  )-----------( 191      ( 23 Apr 2018 06:26 )             33.1   04-23    136  |  97  |  5<L>  ----------------------------<  110<H>  3.7   |  28  |  0.75    Ca    9.7      23 Apr 2018 05:42              MICROBIOLOGY:  RECENT CULTURES:  4/17 blood cultures are negative    RADIOLOGY REVIEWED

## 2018-04-24 NOTE — CHART NOTE - NSCHARTNOTEFT_GEN_A_CORE
* pt seen by resident today. To be formally staffed with attending tomorrow *    62 yo F s/p recent elective schwannoma resection. Admitted for meningitis. Rash on buttocks and back. Scabies? * pt seen by resident today. To be formally staffed with attending tomorrow *    64 yo F s/p recent elective schwannoma resection. Admitted for meningitis. On Vancomycin. Derm c/s for pruritic rash on buttocks and back. Per pt, it began 3d ago. No similar lesions elsewhere. Per pt, no h/o similar lesions in the past.     Exam: erythematous excoriated papules on lower back and buttocks.    1.) Non-specific excoriated papules. Could be self-induced in the s/o pruritus caused by dry skin. No e/o scabies on exam today.  -Aquaphor to lesions BID.   -Additional recommendations following consultation with our attending tomorrow.

## 2018-04-24 NOTE — PROGRESS NOTE ADULT - ASSESSMENT
63F with Right vestibular schwannoma s/p R occipital crani for resection (POD #12), was reportedly found down and confused by mother. Transferred from [facility] for treatment of possible meningitis (coag neg staph) and seizures.

## 2018-04-24 NOTE — PROGRESS NOTE ADULT - SUBJECTIVE AND OBJECTIVE BOX
Subjective: Patient seen and examined. No new events except as noted.     SUBJECTIVE/ROS:  No chest pain, dyspnea, palpitation, or dizziness.       MEDICATIONS:  MEDICATIONS  (STANDING):  AQUAPHOR (petrolatum Ointment) 1 Application(s) Topical two times a day  enoxaparin Injectable 40 milliGRAM(s) SubCutaneous <User Schedule>  folic acid 1 milliGRAM(s) Oral daily  labetalol 300 milliGRAM(s) Oral two times a day  levETIRAcetam 1000 milliGRAM(s) Oral two times a day  losartan 25 milliGRAM(s) Oral daily  multivitamin 1 Tablet(s) Oral daily  nystatin Cream 1 Application(s) Topical two times a day  sodium chloride 1 Gram(s) Oral every 8 hours  thiamine 300 milliGRAM(s) Oral daily  vancomycin  IVPB 1000 milliGRAM(s) IV Intermittent every 12 hours      PHYSICAL EXAM:  T(C): 37.3 (04-24-18 @ 16:00), Max: 37.3 (04-24-18 @ 16:00)  HR: 71 (04-24-18 @ 16:00) (68 - 100)  BP: 145/90 (04-24-18 @ 16:00) (108/71 - 145/90)  RR: 18 (04-24-18 @ 16:00) (16 - 18)  SpO2: 96% (04-24-18 @ 16:00) (94% - 98%)  Wt(kg): --  I&O's Summary    23 Apr 2018 07:01  -  24 Apr 2018 07:00  --------------------------------------------------------  IN: 1550 mL / OUT: 0 mL / NET: 1550 mL    24 Apr 2018 07:01  -  24 Apr 2018 18:17  --------------------------------------------------------  IN: 480 mL / OUT: 0 mL / NET: 480 mL        JVP: Normal  Neck: supple  Lung: clear   CV: S1 S2 , Murmur:  Abd: soft  Ext: No edema  neuro: Awake / alert  Psych: flat affect  Skin: normal     LABS/DATA:    CARDIAC MARKERS:                                11.4   4.41  )-----------( 191      ( 23 Apr 2018 06:26 )             33.1     04-23    136  |  97  |  5<L>  ----------------------------<  110<H>  3.7   |  28  |  0.75    Ca    9.7      23 Apr 2018 05:42      proBNP:   Lipid Profile:   HgA1c:   TSH:     TELE:  EKG:

## 2018-04-25 LAB
ANION GAP SERPL CALC-SCNC: 11 MMOL/L — SIGNIFICANT CHANGE UP (ref 5–17)
BUN SERPL-MCNC: 7 MG/DL — SIGNIFICANT CHANGE UP (ref 7–23)
CALCIUM SERPL-MCNC: 9.6 MG/DL — SIGNIFICANT CHANGE UP (ref 8.4–10.5)
CHLORIDE SERPL-SCNC: 100 MMOL/L — SIGNIFICANT CHANGE UP (ref 96–108)
CO2 SERPL-SCNC: 28 MMOL/L — SIGNIFICANT CHANGE UP (ref 22–31)
CREAT SERPL-MCNC: 0.82 MG/DL — SIGNIFICANT CHANGE UP (ref 0.5–1.3)
GLUCOSE SERPL-MCNC: 88 MG/DL — SIGNIFICANT CHANGE UP (ref 70–99)
HCT VFR BLD CALC: 33.3 % — LOW (ref 34.5–45)
HGB BLD-MCNC: 11.6 G/DL — SIGNIFICANT CHANGE UP (ref 11.5–15.5)
MCHC RBC-ENTMCNC: 34.9 GM/DL — SIGNIFICANT CHANGE UP (ref 32–36)
MCHC RBC-ENTMCNC: 36.3 PG — HIGH (ref 27–34)
MCV RBC AUTO: 104 FL — HIGH (ref 80–100)
PLATELET # BLD AUTO: 195 K/UL — SIGNIFICANT CHANGE UP (ref 150–400)
POTASSIUM SERPL-MCNC: 3.5 MMOL/L — SIGNIFICANT CHANGE UP (ref 3.5–5.3)
POTASSIUM SERPL-SCNC: 3.5 MMOL/L — SIGNIFICANT CHANGE UP (ref 3.5–5.3)
RBC # BLD: 3.21 M/UL — LOW (ref 3.8–5.2)
RBC # FLD: 12 % — SIGNIFICANT CHANGE UP (ref 10.3–14.5)
SODIUM SERPL-SCNC: 139 MMOL/L — SIGNIFICANT CHANGE UP (ref 135–145)
VANCOMYCIN TROUGH SERPL-MCNC: 19.9 UG/ML — SIGNIFICANT CHANGE UP (ref 10–20)
WBC # BLD: 3.3 K/UL — LOW (ref 3.8–10.5)
WBC # FLD AUTO: 3.3 K/UL — LOW (ref 3.8–10.5)

## 2018-04-25 PROCEDURE — 99223 1ST HOSP IP/OBS HIGH 75: CPT | Mod: GC

## 2018-04-25 PROCEDURE — 99232 SBSQ HOSP IP/OBS MODERATE 35: CPT

## 2018-04-25 PROCEDURE — 99233 SBSQ HOSP IP/OBS HIGH 50: CPT

## 2018-04-25 RX ORDER — OXYCODONE AND ACETAMINOPHEN 5; 325 MG/1; MG/1
1 TABLET ORAL EVERY 4 HOURS
Qty: 0 | Refills: 0 | Status: DISCONTINUED | OUTPATIENT
Start: 2018-04-25 | End: 2018-04-28

## 2018-04-25 RX ADMIN — Medication 300 MILLIGRAM(S): at 06:39

## 2018-04-25 RX ADMIN — NYSTATIN CREAM 1 APPLICATION(S): 100000 CREAM TOPICAL at 17:31

## 2018-04-25 RX ADMIN — OXYCODONE AND ACETAMINOPHEN 1 TABLET(S): 5; 325 TABLET ORAL at 08:59

## 2018-04-25 RX ADMIN — Medication 1 TABLET(S): at 11:50

## 2018-04-25 RX ADMIN — Medication 250 MILLIGRAM(S): at 10:25

## 2018-04-25 RX ADMIN — OXYCODONE AND ACETAMINOPHEN 1 TABLET(S): 5; 325 TABLET ORAL at 17:32

## 2018-04-25 RX ADMIN — OXYCODONE AND ACETAMINOPHEN 1 TABLET(S): 5; 325 TABLET ORAL at 09:30

## 2018-04-25 RX ADMIN — OXYCODONE AND ACETAMINOPHEN 1 TABLET(S): 5; 325 TABLET ORAL at 01:00

## 2018-04-25 RX ADMIN — OXYCODONE HYDROCHLORIDE 5 MILLIGRAM(S): 5 TABLET ORAL at 04:15

## 2018-04-25 RX ADMIN — OXYCODONE HYDROCHLORIDE 5 MILLIGRAM(S): 5 TABLET ORAL at 05:00

## 2018-04-25 RX ADMIN — Medication 300 MILLIGRAM(S): at 11:50

## 2018-04-25 RX ADMIN — OXYCODONE AND ACETAMINOPHEN 1 TABLET(S): 5; 325 TABLET ORAL at 18:15

## 2018-04-25 RX ADMIN — ENOXAPARIN SODIUM 40 MILLIGRAM(S): 100 INJECTION SUBCUTANEOUS at 17:32

## 2018-04-25 RX ADMIN — Medication 300 MILLIGRAM(S): at 17:32

## 2018-04-25 RX ADMIN — OXYCODONE AND ACETAMINOPHEN 1 TABLET(S): 5; 325 TABLET ORAL at 23:19

## 2018-04-25 RX ADMIN — OXYCODONE AND ACETAMINOPHEN 1 TABLET(S): 5; 325 TABLET ORAL at 22:49

## 2018-04-25 RX ADMIN — LEVETIRACETAM 1000 MILLIGRAM(S): 250 TABLET, FILM COATED ORAL at 06:39

## 2018-04-25 RX ADMIN — NYSTATIN CREAM 1 APPLICATION(S): 100000 CREAM TOPICAL at 11:49

## 2018-04-25 RX ADMIN — Medication 250 MILLIGRAM(S): at 23:24

## 2018-04-25 RX ADMIN — Medication 1 APPLICATION(S): at 17:31

## 2018-04-25 RX ADMIN — Medication 650 MILLIGRAM(S): at 05:00

## 2018-04-25 RX ADMIN — Medication 1 APPLICATION(S): at 11:49

## 2018-04-25 RX ADMIN — Medication 1 APPLICATION(S): at 21:47

## 2018-04-25 RX ADMIN — LEVETIRACETAM 1000 MILLIGRAM(S): 250 TABLET, FILM COATED ORAL at 17:32

## 2018-04-25 RX ADMIN — Medication 1 MILLIGRAM(S): at 11:50

## 2018-04-25 RX ADMIN — Medication 650 MILLIGRAM(S): at 04:17

## 2018-04-25 RX ADMIN — LOSARTAN POTASSIUM 25 MILLIGRAM(S): 100 TABLET, FILM COATED ORAL at 06:39

## 2018-04-25 RX ADMIN — OXYCODONE AND ACETAMINOPHEN 1 TABLET(S): 5; 325 TABLET ORAL at 13:27

## 2018-04-25 RX ADMIN — SODIUM CHLORIDE 1 GRAM(S): 9 INJECTION INTRAMUSCULAR; INTRAVENOUS; SUBCUTANEOUS at 06:39

## 2018-04-25 NOTE — CONSULT NOTE ADULT - SUBJECTIVE AND OBJECTIVE BOX
62 yo F s/p recent elective schwannoma resection. Admitted for staph meningitis. On Vancomycin. Derm c/s for pruritic rash on buttocks and back. Per pt, it began 5d ago. No similar lesions elsewhere. Per pt, no h/o similar lesions in the past. Derm c/s to r/o scabies.    REVIEW OF SYSTEMS: As per HPI    T(C): 36.7 (04-25-18 @ 16:00), Max: 37.1 (04-25-18 @ 09:15)  HR: 90 (04-25-18 @ 16:00) (75 - 96)  BP: 116/77 (04-25-18 @ 16:00) (116/77 - 147/80)  RR: 18 (04-25-18 @ 16:00) (18 - 18)  SpO2: 97% (04-25-18 @ 16:00) (94% - 97%)  Wt(kg): --Vital Signs Last 24 Hrs  T(C): 36.7 (25 Apr 2018 16:00), Max: 37.1 (25 Apr 2018 09:15)  T(F): 98 (25 Apr 2018 16:00), Max: 98.7 (25 Apr 2018 09:15)  HR: 90 (25 Apr 2018 16:00) (75 - 96)  BP: 116/77 (25 Apr 2018 16:00) (116/77 - 147/80)  BP(mean): --  RR: 18 (25 Apr 2018 16:00) (18 - 18)  SpO2: 97% (25 Apr 2018 16:00) (94% - 97%)    PHYSICAL EXAM:  GENERAL: NAD  SKIN:  erythematous excoriated papules on the bilateral lower back and buttocks.    Consultant(s) Notes Reviewed:  [x ] YES  [ ] NO  Care Discussed with Consultants/Other Providers [ x] YES  [ ] NO    LABS:                        11.6   3.3   )-----------( 195      ( 25 Apr 2018 08:37 )             33.3     04-25    139  |  100  |  7   ----------------------------<  88  3.5   |  28  |  0.82    Ca    9.6      25 Apr 2018 08:37          CAPILLARY BLOOD GLUCOSE                RADIOLOGY & ADDITIONAL TESTS:

## 2018-04-25 NOTE — CONSULT NOTE ADULT - ASSESSMENT
1.) Erythematous papules- The lesions are non-specific and likely represent excoriations due to pruritus in this area, which is mostly likely caused by a mild irritant contact dermatitis from moisture in the area under her diaper. No evidence of scabies. Not clinically consistent with herpes zoster.  -Triamcinolone 0.1% cream BID x 1 week. 1.) Erythematous papules-   The lesions are non-specific and likely represent excoriations due to pruritus in this area, which is mostly likely caused by a mild irritant contact dermatitis from moisture in the area under her diaper.   No evidence of scabies.   Not clinically consistent with herpes zoster.    Trial Triamcinolone 0.1% cream BID x 1 week.

## 2018-04-25 NOTE — PROGRESS NOTE ADULT - ASSESSMENT
NSVT   keep electrolytes in normal range  no evidence of MI  awaiting echo  agree with BB    meningitis   anbx  fu with ID    HTN  labile  cont current meds

## 2018-04-25 NOTE — PROGRESS NOTE ADULT - ASSESSMENT
63F with Right vestibular schwannoma s/p R occipital crani for resection (POD #12), was reportedly found down and confused by mother. Transferred from [facility] for treatment of possible meningitis (coag neg staph) and seizures.

## 2018-04-25 NOTE — PROGRESS NOTE ADULT - SUBJECTIVE AND OBJECTIVE BOX
Authored by Dr Steven Blankenship [phone]    Patient is a 63y old  Female who presents with a chief complaint of AMS (17 Apr 2018 16:35)    SUBJECTIVE / OVERNIGHT EVENTS:    MEDICATIONS  (STANDING):  AQUAPHOR (petrolatum Ointment) 1 Application(s) Topical two times a day  enoxaparin Injectable 40 milliGRAM(s) SubCutaneous <User Schedule>  folic acid 1 milliGRAM(s) Oral daily  labetalol 300 milliGRAM(s) Oral two times a day  levETIRAcetam 1000 milliGRAM(s) Oral two times a day  losartan 25 milliGRAM(s) Oral daily  multivitamin 1 Tablet(s) Oral daily  nystatin Cream 1 Application(s) Topical two times a day  thiamine 300 milliGRAM(s) Oral daily  vancomycin  IVPB 1000 milliGRAM(s) IV Intermittent every 12 hours    MEDICATIONS  (PRN):  acetaminophen   Tablet. 650 milliGRAM(s) Oral every 6 hours PRN Mild Pain (1 - 3)  ondansetron   Disintegrating Tablet 4 milliGRAM(s) Oral every 6 hours PRN Nausea  oxyCODONE    5 mG/acetaminophen 325 mG 1 Tablet(s) Oral every 4 hours PRN Moderate Pain (4 - 6)      Vital Signs Last 24 Hrs  T(C): 37.1 (25 Apr 2018 09:15), Max: 37.3 (24 Apr 2018 16:00)  T(F): 98.7 (25 Apr 2018 09:15), Max: 99.1 (24 Apr 2018 16:00)  HR: 84 (25 Apr 2018 09:15) (71 - 96)  BP: 147/80 (25 Apr 2018 09:15) (137/90 - 147/80)  BP(mean): --  RR: 18 (25 Apr 2018 09:15) (18 - 18)  SpO2: 95% (25 Apr 2018 09:15) (94% - 96%)  CAPILLARY BLOOD GLUCOSE        I&O's Summary    24 Apr 2018 07:01  -  25 Apr 2018 07:00  --------------------------------------------------------  IN: 480 mL / OUT: 0 mL / NET: 480 mL        PHYSICAL EXAM  GENERAL: NAD  HEENT: s/p right occipital crani, MMM, neck supple, no JVD  EYES: EOMI, PERRLA, conjunctiva and sclera clear  CHEST/LUNG: Clear to auscultation bilaterally; No wheeze, rhonchi or rales.  HEART: S1, S2, rrr; No murmurs, rubs, or gallops  ABDOMEN: Soft, Nontender, Nondistended; Bowel sounds present  EXTREMITIES:  2+ Peripheral Pulses, No clubbing, cyanosis, or edema  PSYCH: calm, limited affect  NEUROLOGY: AOx3  SKIN:    LABS:                        11.6   3.3   )-----------( 195      ( 25 Apr 2018 08:37 )             33.3     04-25  139  |  100  |  7   ----------------------------<  88  3.5   |  28  |  0.82    Ca    9.6      25 Apr 2018 08:37          Vancomycin Level, Trough: 19.9 ug/mL (04.25.18 @ 08:37)            RADIOLOGY & ADDITIONAL TESTS:    Imaging Personally Reviewed:  Consultant(s) Notes Reviewed:  derm  Care Discussed with Consultants/Other Providers: Authored by Dr Steven Blankenship [phone]    Patient is a 63y old  Female who presents with a chief complaint of AMS (17 Apr 2018 16:35)    SUBJECTIVE / OVERNIGHT EVENTS: son at bedside. pt w/some ha still, pain med regimen may not be frequent enough, no n/v/d    MEDICATIONS  (STANDING):  AQUAPHOR (petrolatum Ointment) 1 Application(s) Topical two times a day  enoxaparin Injectable 40 milliGRAM(s) SubCutaneous <User Schedule>  folic acid 1 milliGRAM(s) Oral daily  labetalol 300 milliGRAM(s) Oral two times a day  levETIRAcetam 1000 milliGRAM(s) Oral two times a day  losartan 25 milliGRAM(s) Oral daily  multivitamin 1 Tablet(s) Oral daily  nystatin Cream 1 Application(s) Topical two times a day  thiamine 300 milliGRAM(s) Oral daily  vancomycin  IVPB 1000 milliGRAM(s) IV Intermittent every 12 hours    MEDICATIONS  (PRN):  acetaminophen   Tablet. 650 milliGRAM(s) Oral every 6 hours PRN Mild Pain (1 - 3)  ondansetron   Disintegrating Tablet 4 milliGRAM(s) Oral every 6 hours PRN Nausea  oxyCODONE    5 mG/acetaminophen 325 mG 1 Tablet(s) Oral every 4 hours PRN Moderate Pain (4 - 6)      Vital Signs Last 24 Hrs  T(C): 37.1 (25 Apr 2018 09:15), Max: 37.3 (24 Apr 2018 16:00)  T(F): 98.7 (25 Apr 2018 09:15), Max: 99.1 (24 Apr 2018 16:00)  HR: 84 (25 Apr 2018 09:15) (71 - 96)  BP: 147/80 (25 Apr 2018 09:15) (137/90 - 147/80)  BP(mean): --  RR: 18 (25 Apr 2018 09:15) (18 - 18)  SpO2: 95% (25 Apr 2018 09:15) (94% - 96%)  CAPILLARY BLOOD GLUCOSE        I&O's Summary    24 Apr 2018 07:01  -  25 Apr 2018 07:00  --------------------------------------------------------  IN: 480 mL / OUT: 0 mL / NET: 480 mL        PHYSICAL EXAM  GENERAL: NAD  HEENT: s/p right occipital crani, MMM, neck supple, no JVD  EYES: EOMI, PERRLA, conjunctiva and sclera clear  CHEST/LUNG: Clear to auscultation bilaterally; No wheeze, rhonchi or rales.  HEART: S1, S2, rrr; No murmurs, rubs, or gallops  ABDOMEN: Soft, Nontender, Nondistended; Bowel sounds present  EXTREMITIES:  2+ Peripheral Pulses, No clubbing, cyanosis, or edema  PSYCH: calm, limited affect  NEUROLOGY: AOx3  SKIN: back not examined today but no complaints    LABS:                        11.6   3.3   )-----------( 195      ( 25 Apr 2018 08:37 )             33.3     04-25  139  |  100  |  7   ----------------------------<  88  3.5   |  28  |  0.82    Ca    9.6      25 Apr 2018 08:37          Vancomycin Level, Trough: 19.9 ug/mL (04.25.18 @ 08:37)            RADIOLOGY & ADDITIONAL TESTS:    Imaging Personally Reviewed:  Consultant(s) Notes Reviewed:  derm  Care Discussed with Consultants/Other Providers:

## 2018-04-25 NOTE — PROGRESS NOTE ADULT - ASSESSMENT
62 yo f hx R occipital crani for resection of vestibular schwanomma, POD#13, now transferred from [facility], after found unresponsive, confused, started on empiric Vanco and Ceftriaxone.    Cultures obtained at [facility] lab [phone] with coag negative staph in CSF  currently on empiric Vanco and Cefepime, she had low grade fever and leukocytosis.   CT: Status post right occipital craniotomy. Subjacent low density extra axial   collection measuring 7 mm in greatest depth.  she had serous fluid leak from incision,--sutured  CSF culture from [facility] with CoNs--reported as per NSICU team --official report to follow  cultures here have been negative      PLAN:  cont IV Vanco for possible post-surgery meningitis with coag neg staph,  serial CT as per neurosurgery  local skin care   if repeat cultures remain negative would limit Vanco to 14 days total, day 8/14  monitor level and adjust accordingly, changed to 1g IV q12 now, renal function remains stable  If headaches persist would consider additional w/u

## 2018-04-25 NOTE — PROGRESS NOTE ADULT - PROBLEM SELECTOR PLAN 1
CoNS - anticipate ~14d total vanco course as per ID  monitor vanc trough - last one was supratherapeutic - vanc dose now 1g Q 12 - level acceptable

## 2018-04-25 NOTE — PROGRESS NOTE ADULT - SUBJECTIVE AND OBJECTIVE BOX
Subjective: Patient seen and examined. No new events except as noted.     SUBJECTIVE/ROS:  no new events      MEDICATIONS:  MEDICATIONS  (STANDING):  AQUAPHOR (petrolatum Ointment) 1 Application(s) Topical two times a day  enoxaparin Injectable 40 milliGRAM(s) SubCutaneous <User Schedule>  folic acid 1 milliGRAM(s) Oral daily  labetalol 300 milliGRAM(s) Oral two times a day  levETIRAcetam 1000 milliGRAM(s) Oral two times a day  losartan 25 milliGRAM(s) Oral daily  multivitamin 1 Tablet(s) Oral daily  nystatin Cream 1 Application(s) Topical two times a day  thiamine 300 milliGRAM(s) Oral daily  triamcinolone 0.1% Cream 1 Application(s) Topical two times a day  vancomycin  IVPB 1000 milliGRAM(s) IV Intermittent every 12 hours      PHYSICAL EXAM:  T(C): 36.7 (04-25-18 @ 16:00), Max: 37.1 (04-25-18 @ 09:15)  HR: 77 (04-25-18 @ 17:33) (75 - 96)  BP: 167/110 (04-25-18 @ 17:33) (116/77 - 167/110)  RR: 18 (04-25-18 @ 16:00) (18 - 18)  SpO2: 97% (04-25-18 @ 16:00) (94% - 97%)  Wt(kg): --  I&O's Summary    24 Apr 2018 07:01  -  25 Apr 2018 07:00  --------------------------------------------------------  IN: 480 mL / OUT: 0 mL / NET: 480 mL    25 Apr 2018 07:01  -  25 Apr 2018 18:11  --------------------------------------------------------  IN: 780 mL / OUT: 0 mL / NET: 780 mL          Appearance: Normal	  HEENT:   Normal oral mucosa, PERRL, EOMI	  Cardiovascular: Normal S1 S2,    Murmur:   Neck: JVP normal  Respiratory: Lungs clear to auscultation  Gastrointestinal:  Soft, Non-tender, + BS	  Skin: normal   Neuro: No gross deficits.   Psychiatry:  Mood & affect appropriate  Ext: No edema      LABS/DATA:    CARDIAC MARKERS:                                11.6   3.3   )-----------( 195      ( 25 Apr 2018 08:37 )             33.3     04-25    139  |  100  |  7   ----------------------------<  88  3.5   |  28  |  0.82    Ca    9.6      25 Apr 2018 08:37      proBNP:   Lipid Profile:   HgA1c:   TSH:     TELE:  EKG:

## 2018-04-25 NOTE — PROGRESS NOTE ADULT - ASSESSMENT
HPI: 64 yo female s/p right retrosigmoid for vestibular schwannoma resection on 4/5/18 who was transferred from [facility] ED after she was reportedly found down and confused by mother who called 911.  Mother was not available at bedside at [facility] ED. At [place], per report, CSF showed , , glucose 98, protein 240. Febrile to 100.6, tachy to 156, 98% RA, 158/88.  Reportedly seized at OSH at was given 2 ativan.    PLAN:  -ID Follow up appreciated. Continue Vancomycin 1g q12 for total of 14 days if repeat cultures remain negative(day 8/14 today) - check trough prior to 4th dose. Patient will remain in the hospital for remaining treatment as she has poor social support at home.   -Continue Keppra 1g BID for history of seizure upon admission  -Continue Labetalol 300 TID and Losartan for hx of HTN; BP currently well controlled  -Continue Oxy IR PRN for pain control  -Derm consult appreciated for erythematous excoriated papules on lower back and buttocks. Continue Aquaphor BID. Will f/u further recs.  -Continue NaCl tabs 1g q8 for hyponatremia; will check Na level today  -Hypokalemia resolved  -Continue colace, senna for bowel regimen  -Encouraged mobilization  -Encouraged incentive spirometer  -DVT prophylaxis: SQL, venodynes  -Dispo: home PT/OT w/ rolling walker pending stairs  -[id number] HPI: 64 yo female s/p right retrosigmoid for vestibular schwannoma resection on 4/5/18 who was transferred from [facility] ED after she was reportedly found down and confused by mother who called 911.  Mother was not available at bedside at [facility] ED. At [place], per report, CSF showed , , glucose 98, protein 240. Febrile to 100.6, tachy to 156, 98% RA, 158/88.  Reportedly seized at OSH at was given 2 ativan.    PLAN:  -ID Follow up appreciated. Continue Vancomycin 1g q12 for total of 14 days if repeat cultures remain negative(day 8/14 today) - check trough prior to 4th dose. Patient will remain in the hospital for remaining treatment as she has poor social support at home.   -Continue Keppra 1g BID for history of seizure upon admission  -Continue Labetalol 300 TID and Losartan for hx of HTN; BP currently well controlled  -Continue Oxy IR PRN for pain control  -Derm consult appreciated for erythematous excoriated papules on lower back and buttocks. Continue Aquaphor BID. Will f/u further recs.  -Hyponatremia resolved; will d/c NaCl tabs today  -Hypokalemia resolved  -Continue colace, senna for bowel regimen  -Encouraged mobilization  -Encouraged incentive spirometer  -DVT prophylaxis: SQL, venodynes  -Dispo: home PT/OT w/ rolling walker pending stairs  -[id number]

## 2018-04-25 NOTE — PROGRESS NOTE ADULT - SUBJECTIVE AND OBJECTIVE BOX
SUBJECTIVE: Patient seen and examined at bedside. No new complaints today.     Vital Signs Last 24 Hrs  T(C): 36.5 (04-25-18 @ 05:00), Max: 37.3 (04-24-18 @ 16:00)  T(F): 97.7 (04-25-18 @ 05:00), Max: 99.1 (04-24-18 @ 16:00)  HR: 75 (04-25-18 @ 05:00) (68 - 96)  BP: 138/81 (04-25-18 @ 05:00) (118/77 - 145/90)  RR: 18 (04-25-18 @ 05:00) (18 - 18)  SpO2: 94% (04-25-18 @ 05:00) (94% - 97%)    PHYSICAL EXAM:    Constitutional: No Acute Distress, resting comfortably in bed    Neurological: Awake, alert, oriented to person, place and time, +right facial, speech slightly dysarthric, no drift, tongue midline, moving all extremities with 5/5 strength, sensation intact to light touch on all extremities    Incision: +right retrosigmoid incision site clean and dry without drainage    Pulmonary: Clear to Auscultation, No rales, No rhonchi, No wheezes     Cardiovascular: S1, S2, Regular rate and rhythm     Gastrointestinal: Soft, Non-tender, Non-distended     Extremities: No calf tenderness bilaterally; +left PICC in place, extremities warm      LABS:             04-24 @ 07:01  -  04-25 @ 07:00  --------------------------------------------------------  IN: 480 mL / OUT: 0 mL / NET: 480 mL        IMAGING: no new imaging    MEDICATIONS:  Antibiotics:  vancomycin  IVPB 1000 milliGRAM(s) IV Intermittent every 12 hours    Neuro:  acetaminophen   Tablet. 650 milliGRAM(s) Oral every 6 hours PRN Mild Pain (1 - 3)  levETIRAcetam 1000 milliGRAM(s) Oral two times a day  ondansetron   Disintegrating Tablet 4 milliGRAM(s) Oral every 6 hours PRN Nausea  oxyCODONE    IR 5 milliGRAM(s) Oral every 6 hours PRN Moderate Pain (4 - 6)    Cardiac:  labetalol 300 milliGRAM(s) Oral two times a day  losartan 25 milliGRAM(s) Oral daily    Pulm:    GI/:    Other:   AQUAPHOR (petrolatum Ointment) 1 Application(s) Topical two times a day  enoxaparin Injectable 40 milliGRAM(s) SubCutaneous <User Schedule>  folic acid 1 milliGRAM(s) Oral daily  multivitamin 1 Tablet(s) Oral daily  nystatin Cream 1 Application(s) Topical two times a day  sodium chloride 1 Gram(s) Oral every 8 hours  thiamine 300 milliGRAM(s) Oral daily        DIET: regular diet

## 2018-04-25 NOTE — PROGRESS NOTE ADULT - SUBJECTIVE AND OBJECTIVE BOX
CC: f/u for post op fever    Patient reports moderate headaches, no localizing symptoms besides headache    REVIEW OF SYSTEMS:  All other review of systems negative (Comprehensive ROS)    Antimicrobials Day #  :day 8/14  vancomycin  IVPB 1000 milliGRAM(s) IV Intermittent every 12 hours    Other Medications Reviewed    T(F): 98 (04-25-18 @ 16:00), Max: 98.7 (04-25-18 @ 09:15)  HR: 77 (04-25-18 @ 17:33)  BP: 167/110 (04-25-18 @ 17:33)  RR: 18 (04-25-18 @ 16:00)  SpO2: 97% (04-25-18 @ 16:00)  Wt(kg): --    PHYSICAL EXAM:  General: alert, no acute distress  Eyes:  anicteric, no conjunctival injection, no discharge  Oropharynx: no lesions or injection 	  Neck: supple, without adenopathy  Lungs: clear to auscultation  Heart: regular rate and rhythm; no murmur, rubs or gallops  Abdomen: soft, nondistended, nontender, without mass or organomegaly  Skin: no lesions.Craniotomy incision dry  Extremities: no clubbing, cyanosis, or edema  Neurologic: alert, oriented, moves all extremities    LAB RESULTS:                        11.6   3.3   )-----------( 195      ( 25 Apr 2018 08:37 )             33.3     04-25    139  |  100  |  7   ----------------------------<  88  3.5   |  28  |  0.82    Ca    9.6      25 Apr 2018 08:37          MICROBIOLOGY:  RECENT CULTURES:      RADIOLOGY REVIEWED:    < from: CT Head No Cont (04.19.18 @ 08:29) >  COMPARISON: CT head dated 4/17/2018.    FINDINGS:  A right occipital craniotomy with associated calvarial and soft tissue   changes are seen. Prominence of the right cerebellar convexity   extra-axial space is consistent with recent postoperative status. Lucency   along the right cerebellar hemisphere is slightly increased in  conspicuity without parenchymal hemorrhage.    Ventricles and sulci otherwise appear intact. There is otherwise no new   intracranial attenuation abnormality. There is no intraparenchymal   hematoma, mass effect or midline shift. No abnormal extra-axial fluid   collections are present. There is no evidence of acute transcortical   territorial infarction.    The calvarium is intact. The visualized intraorbital compartments,   paranasal sinuses and tympanomastoid cavities appear free of acute   disease.      IMPRESSION:  Stable exam.  No acute intracranial hemorrhage.    < end of copied text >

## 2018-04-25 NOTE — CHART NOTE - NSCHARTNOTEFT_GEN_A_CORE
NUTRITION FOLLOW UP NOTE   Pt seen for length of stay.     chart reviewed, events noted. 63F with Right vestibular schwannoma s/p R occipital crani for resection (now POD #20), was reportedly found down and confused by mother. Transferred from [facility] for treatment of possible meningitis (coag neg staph) and seizures.      Source: Patient [x]    Family [ ]     other [x] comprehensive chart review, RN    Diet : Regular      Patient with very flat affect, asking for main medication-RN aware. Denies any GI distress. Per chart +BM on 4/24. Per RN Pt with minimal PO intake despite encouragement from staff and family. States family even brought in outside food which Pt did not want to eat.    Per chart Pt ate 100% x 1 meal. Per RN Pt eating <50% intake.        No new Wt to address.    Pertinent Medications: MEDICATIONS  (STANDING):  AQUAPHOR (petrolatum Ointment) 1 Application(s) Topical two times a day  enoxaparin Injectable 40 milliGRAM(s) SubCutaneous <User Schedule>  folic acid 1 milliGRAM(s) Oral daily  labetalol 300 milliGRAM(s) Oral two times a day  levETIRAcetam 1000 milliGRAM(s) Oral two times a day  losartan 25 milliGRAM(s) Oral daily  multivitamin 1 Tablet(s) Oral daily  nystatin Cream 1 Application(s) Topical two times a day  thiamine 300 milliGRAM(s) Oral daily  vancomycin  IVPB 1000 milliGRAM(s) IV Intermittent every 12 hours    MEDICATIONS  (PRN):  acetaminophen   Tablet. 650 milliGRAM(s) Oral every 6 hours PRN Mild Pain (1 - 3)  ondansetron   Disintegrating Tablet 4 milliGRAM(s) Oral every 6 hours PRN Nausea  oxyCODONE    5 mG/acetaminophen 325 mG 1 Tablet(s) Oral every 4 hours PRN Moderate Pain (4 - 6)    Pertinent Labs:  04-25 Na139 mmol/L Glu 88 mg/dL K+ 3.5 mmol/L Cr  0.82 mg/dL BUN 7 mg/dL 04-20 Phos 2.9 mg/dL      Skin:     Estimated Needs:   [x] no change since previous assessment  [ ] recalculated:       Previous Nutrition Diagnosis:  [x] Increased Nutrient Needs        Nutrition Diagnosis is [x ] ongoing  [ ] resolved [ ] not applicable          New Nutrition Diagnosis: [ ] not applicable    [x ] Inadequate Protein Energy Intake [ ]Inadequate Oral Intake [ ] Excessive Energy Intake     [ ] Underweight [ ] Increased Nutrient Needs [ ] Overweight/Obesity     [ ] Altered GI Function [ ] Unintended Weight Loss [ ] Food & Nutrition Related Knowledge Deficit[ ] Limited Adherence to nutrition related recommendations [ ] Malnutrition  [ ] other: Free text       Related to: decreased appetite     As evidenced by: eating <50% at most meals     Interventions:     Recommend    Pt refusing supplements.  Continue to encourage good po intake at meals   Continue to provide food preferences within diet restrictions as feasible   RD to provide nutrient-dense snacks/health shakes     Monitoring and Evaluation:     [x] PO intake [x] Tolerance to diet prescription [x] weights [x] follow up per protocol    RD to remain available for further nutritional interventions as indicated/requested by medical team/pt.   Magdi Braga, RD, CDN, CDE. Pager: [phone]

## 2018-04-26 PROCEDURE — 99232 SBSQ HOSP IP/OBS MODERATE 35: CPT

## 2018-04-26 RX ORDER — ACETAMINOPHEN 500 MG
1000 TABLET ORAL ONCE
Qty: 0 | Refills: 0 | Status: COMPLETED | OUTPATIENT
Start: 2018-04-26 | End: 2018-04-26

## 2018-04-26 RX ORDER — HYDRALAZINE HCL 50 MG
10 TABLET ORAL ONCE
Qty: 0 | Refills: 0 | Status: COMPLETED | OUTPATIENT
Start: 2018-04-26 | End: 2018-04-26

## 2018-04-26 RX ADMIN — OXYCODONE AND ACETAMINOPHEN 1 TABLET(S): 5; 325 TABLET ORAL at 10:27

## 2018-04-26 RX ADMIN — Medication 650 MILLIGRAM(S): at 06:11

## 2018-04-26 RX ADMIN — OXYCODONE AND ACETAMINOPHEN 1 TABLET(S): 5; 325 TABLET ORAL at 09:38

## 2018-04-26 RX ADMIN — Medication 250 MILLIGRAM(S): at 09:38

## 2018-04-26 RX ADMIN — LOSARTAN POTASSIUM 25 MILLIGRAM(S): 100 TABLET, FILM COATED ORAL at 05:41

## 2018-04-26 RX ADMIN — OXYCODONE AND ACETAMINOPHEN 1 TABLET(S): 5; 325 TABLET ORAL at 21:20

## 2018-04-26 RX ADMIN — Medication 1 MILLIGRAM(S): at 11:33

## 2018-04-26 RX ADMIN — Medication 650 MILLIGRAM(S): at 05:41

## 2018-04-26 RX ADMIN — Medication 1 TABLET(S): at 11:33

## 2018-04-26 RX ADMIN — Medication 1 APPLICATION(S): at 05:44

## 2018-04-26 RX ADMIN — Medication 250 MILLIGRAM(S): at 23:44

## 2018-04-26 RX ADMIN — LEVETIRACETAM 1000 MILLIGRAM(S): 250 TABLET, FILM COATED ORAL at 18:16

## 2018-04-26 RX ADMIN — OXYCODONE AND ACETAMINOPHEN 1 TABLET(S): 5; 325 TABLET ORAL at 03:15

## 2018-04-26 RX ADMIN — NYSTATIN CREAM 1 APPLICATION(S): 100000 CREAM TOPICAL at 18:16

## 2018-04-26 RX ADMIN — OXYCODONE AND ACETAMINOPHEN 1 TABLET(S): 5; 325 TABLET ORAL at 16:47

## 2018-04-26 RX ADMIN — LEVETIRACETAM 1000 MILLIGRAM(S): 250 TABLET, FILM COATED ORAL at 05:41

## 2018-04-26 RX ADMIN — OXYCODONE AND ACETAMINOPHEN 1 TABLET(S): 5; 325 TABLET ORAL at 02:45

## 2018-04-26 RX ADMIN — Medication 300 MILLIGRAM(S): at 11:33

## 2018-04-26 RX ADMIN — Medication 1000 MILLIGRAM(S): at 13:57

## 2018-04-26 RX ADMIN — ENOXAPARIN SODIUM 40 MILLIGRAM(S): 100 INJECTION SUBCUTANEOUS at 18:15

## 2018-04-26 RX ADMIN — Medication 1 APPLICATION(S): at 05:41

## 2018-04-26 RX ADMIN — Medication 10 MILLIGRAM(S): at 01:11

## 2018-04-26 RX ADMIN — Medication 400 MILLIGRAM(S): at 13:20

## 2018-04-26 RX ADMIN — Medication 300 MILLIGRAM(S): at 05:41

## 2018-04-26 RX ADMIN — OXYCODONE AND ACETAMINOPHEN 1 TABLET(S): 5; 325 TABLET ORAL at 20:50

## 2018-04-26 RX ADMIN — Medication 1 APPLICATION(S): at 18:16

## 2018-04-26 RX ADMIN — Medication 300 MILLIGRAM(S): at 18:16

## 2018-04-26 RX ADMIN — OXYCODONE AND ACETAMINOPHEN 1 TABLET(S): 5; 325 TABLET ORAL at 17:30

## 2018-04-26 RX ADMIN — NYSTATIN CREAM 1 APPLICATION(S): 100000 CREAM TOPICAL at 05:41

## 2018-04-26 NOTE — PROGRESS NOTE ADULT - SUBJECTIVE AND OBJECTIVE BOX
Authored by Dr Steven Blankenship [phone]     Patient is a 63y old  Female who presents with a chief complaint of AMS (17 Apr 2018 16:35)    SUBJECTIVE / OVERNIGHT EVENTS:    MEDICATIONS  (STANDING):  enoxaparin Injectable 40 milliGRAM(s) SubCutaneous <User Schedule>  folic acid 1 milliGRAM(s) Oral daily  labetalol 300 milliGRAM(s) Oral two times a day  levETIRAcetam 1000 milliGRAM(s) Oral two times a day  losartan 25 milliGRAM(s) Oral daily  multivitamin 1 Tablet(s) Oral daily  nystatin Cream 1 Application(s) Topical two times a day  thiamine 300 milliGRAM(s) Oral daily  triamcinolone 0.1% Cream 1 Application(s) Topical two times a day  vancomycin  IVPB 1000 milliGRAM(s) IV Intermittent every 12 hours    MEDICATIONS  (PRN):  acetaminophen   Tablet. 650 milliGRAM(s) Oral every 6 hours PRN Mild Pain (1 - 3)  ondansetron   Disintegrating Tablet 4 milliGRAM(s) Oral every 6 hours PRN Nausea  oxyCODONE    5 mG/acetaminophen 325 mG 1 Tablet(s) Oral every 4 hours PRN Moderate Pain (4 - 6)      Vital Signs Last 24 Hrs  T(C): 36.6 (26 Apr 2018 08:00), Max: 37.2 (26 Apr 2018 00:00)  T(F): 97.9 (26 Apr 2018 08:00), Max: 99 (26 Apr 2018 00:00)  HR: 78 (26 Apr 2018 08:00) (66 - 90)  BP: 130/86 (26 Apr 2018 08:00) (116/77 - 167/110)  BP(mean): --  RR: 18 (26 Apr 2018 08:00) (16 - 18)  SpO2: 96% (26 Apr 2018 08:00) (94% - 97%)  CAPILLARY BLOOD GLUCOSE        I&O's Summary    25 Apr 2018 07:01  -  26 Apr 2018 07:00  --------------------------------------------------------  IN: 1390 mL / OUT: 0 mL / NET: 1390 mL        PHYSICAL EXAM      LABS:                        11.6   3.3   )-----------( 195      ( 25 Apr 2018 08:GENERAL: NAD  HEENT: s/p right occipital crani, MMM, neck supple, no JVD  EYES: EOMI, PERRLA, conjunctiva and sclera clear  CHEST/LUNG: Clear to auscultation bilaterally; No wheeze, rhonchi or rales.  HEART: S1, S2, rrr; No murmurs, rubs, or gallops  ABDOMEN: Soft, Nontender, Nondistended; Bowel sounds present  EXTREMITIES:  2+ Peripheral Pulses, No clubbing, cyanosis, or edema  PSYCH: calm, limited affect  NEUROLOGY: AOx3  SKIN: back not examined today but no [id number] )             33.3     04-25    139  |  100  |  7   ----------------------------<  88  3.5   |  28  |  0.82    Ca    9.6      25 Apr 2018 08:37          RADIOLOGY & ADDITIONAL TESTS:    Imaging Personally Reviewed:  Consultant(s) Notes Reviewed:  derm, id   Care Discussed with Consultants/Other Providers: Authored by Dr Steven Blankenship [phone] [id number]    Patient is a 63y old  Female who presents with a chief complaint of AMS (17 Apr 2018 16:35)    SUBJECTIVE / OVERNIGHT EVENTS: still some ha, otherwise feels well    MEDICATIONS  (STANDING):  enoxaparin Injectable 40 milliGRAM(s) SubCutaneous <User Schedule>  folic acid 1 milliGRAM(s) Oral daily  labetalol 300 milliGRAM(s) Oral two times a day  levETIRAcetam 1000 milliGRAM(s) Oral two times a day  losartan 25 milliGRAM(s) Oral daily  multivitamin 1 Tablet(s) Oral daily  nystatin Cream 1 Application(s) Topical two times a day  thiamine 300 milliGRAM(s) Oral daily  triamcinolone 0.1% Cream 1 Application(s) Topical two times a day  vancomycin  IVPB 1000 milliGRAM(s) IV Intermittent every 12 hours    MEDICATIONS  (PRN):  acetaminophen   Tablet. 650 milliGRAM(s) Oral every 6 hours PRN Mild Pain (1 - 3)  ondansetron   Disintegrating Tablet 4 milliGRAM(s) Oral every 6 hours PRN Nausea  oxyCODONE    5 mG/acetaminophen 325 mG 1 Tablet(s) Oral every 4 hours PRN Moderate Pain (4 - 6)      Vital Signs Last 24 Hrs  T(C): 36.6 (26 Apr 2018 08:00), Max: 37.2 (26 Apr 2018 00:00)  T(F): 97.9 (26 Apr 2018 08:00), Max: 99 (26 Apr 2018 00:00)  HR: 78 (26 Apr 2018 08:00) (66 - 90)  BP: 130/86 (26 Apr 2018 08:00) (116/77 - 167/110)  BP(mean): --  RR: 18 (26 Apr 2018 08:00) (16 - 18)  SpO2: 96% (26 Apr 2018 08:00) (94% - 97%)  CAPILLARY BLOOD GLUCOSE        I&O's Summary    25 Apr 2018 07:01  -  26 Apr 2018 07:00  --------------------------------------------------------  IN: 1390 mL / OUT: 0 mL / NET: 1390 mL        PHYSICAL EXAM  HEENT: s/p right occipital crani, MMM, neck supple, no JVD  EYES: EOMI, PERRLA, conjunctiva and sclera clear  CHEST/LUNG: Clear to auscultation bilaterally; No wheeze, rhonchi or rales.  HEART: S1, S2, rrr; No murmurs, rubs, or gallops  ABDOMEN: Soft, Nontender, Nondistended; Bowel sounds present  EXTREMITIES:  2+ Peripheral Pulses, No clubbing, cyanosis, or edema  PSYCH: calm, limited affect  NEUROLOGY: AOx3  SKIN: unchanged    LABS:                        11.6   3.3   )-----------( 195      ( 25 Apr 2018 08:GENERAL: NAD  37 )             33.3     04-25    139  |  100  |  7   ----------------------------<  88  3.5   |  28  |  0.82    Ca    9.6      25 Apr 2018 08:37          RADIOLOGY & ADDITIONAL TESTS:    Imaging Personally Reviewed:  Consultant(s) Notes Reviewed:  derm, id   Care Discussed with Consultants/Other Providers:

## 2018-04-26 NOTE — PROGRESS NOTE ADULT - ASSESSMENT
63F with Right vestibular schwannoma s/p R occipital crani for resection was reportedly found down and confused by mother. Transferred from [facility] for treatment of possible meningitis (coag neg staph) and seizures.

## 2018-04-26 NOTE — PROGRESS NOTE ADULT - SUBJECTIVE AND OBJECTIVE BOX
SUBJECTIVE: Patient seen and examined at bedside. Complains of headaches but patient states they are better than they were on admission. Otherwise no new complaints.     Vital Signs Last 24 Hrs  T(C): 37.2 (04-26-18 @ 04:00), Max: 37.2 (04-26-18 @ 00:00)  T(F): 99 (04-26-18 @ 04:00), Max: 99 (04-26-18 @ 00:00)  HR: 86 (04-26-18 @ 04:00) (66 - 90)  BP: 141/90 (04-26-18 @ 04:00) (116/77 - 167/110)  RR: 18 (04-26-18 @ 04:00) (16 - 18)  SpO2: 94% (04-26-18 @ 04:00) (94% - 97%)    PHYSICAL EXAM:    Constitutional: No Acute Distress, resting comfortably in bed    Neurological: Awake, alert, oriented to person, place and time, +right facial, speech slightly dysarthric, no drift, tongue midline, moving all extremities with 5/5 strength, sensation intact to light touch on all extremities    Incision: +right retrosigmoid incision site clean and dry without drainage; +suture in place C/D/I    Pulmonary: Clear to Auscultation, No rales, No rhonchi, No wheezes     Cardiovascular: S1, S2, Regular rate and rhythm     Gastrointestinal: Soft, Non-tender, Non-distended     Extremities: No calf tenderness bilaterally; +left PICC in place, extremities warm      LABS:                           11.6   3.3   )-----------( 195      ( 25 Apr 2018 08:37 )             33.3   04-25    139  |  100  |  7   ----------------------------<  88  3.5   |  28  |  0.82    Ca    9.6      25 Apr 2018 08:37            IMAGING: no new imaging      MEDICATIONS  (STANDING):  enoxaparin Injectable 40 milliGRAM(s) SubCutaneous <User Schedule>  folic acid 1 milliGRAM(s) Oral daily  labetalol 300 milliGRAM(s) Oral two times a day  levETIRAcetam 1000 milliGRAM(s) Oral two times a day  losartan 25 milliGRAM(s) Oral daily  multivitamin 1 Tablet(s) Oral daily  nystatin Cream 1 Application(s) Topical two times a day  thiamine 300 milliGRAM(s) Oral daily  triamcinolone 0.1% Cream 1 Application(s) Topical two times a day  vancomycin  IVPB 1000 milliGRAM(s) IV Intermittent every 12 hours    MEDICATIONS  (PRN):  acetaminophen   Tablet. 650 milliGRAM(s) Oral every 6 hours PRN Mild Pain (1 - 3)  ondansetron   Disintegrating Tablet 4 milliGRAM(s) Oral every 6 hours PRN Nausea  oxyCODONE    5 mG/acetaminophen 325 mG 1 Tablet(s) Oral every 4 hours PRN Moderate Pain (4 - 6)        DIET: regular diet

## 2018-04-26 NOTE — PROGRESS NOTE ADULT - SUBJECTIVE AND OBJECTIVE BOX
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 head wound intact but feels like some fluid under scalp  Eyes:  anicteric, no conjunctival injection, no discharge  Oropharynx: no lesions or injection 	  Neck: supple, without adenopathy  Lungs: clear to auscultation  Heart: regular rate and rhythm; no murmur, rubs or gallops  Abdomen: soft, nondistended, nontender, without mass or organomegaly  Skin: no lesions  Extremities: no clubbing, cyanosis, or edema  Neurologic: alert, oriented, moves all extremities    LAB RESULTS:                        11.6   3.3   )-----------( 195      ( 25 Apr 2018 08:37 )             33.3     04-25    139  |  100  |  7   ----------------------------<  88  3.5   |  28  |  0.82    Ca    9.6      25 Apr 2018 08:37          MICROBIOLOGY:  RECENT CULTURES:  Culture - Blood (04.17.18 @ 19:16)    Specimen Source: .Blood Blood    Culture Results:   No growth at 5 days.    [place] cx final coag neg staph sensitive to vanco    RADIOLOGY REVIEWED:    < from: CT Head No Cont (04.19.18 @ 08:29) >  EXAM:  CT BRAIN                            PROCEDURE DATE:  04/19/2018            INTERPRETATION:  HISTORY: Intracranial hemorrhage. Status post craniotomy   for tumor resection.    Technique: Noncontrast CT of the head was performed.    Multiple contiguous axial images were acquired from the skull base to the   vertex without the administration of intravenous contrast. Coronal and   sagittal reformations were made.    COMPARISON: CT head dated 4/17/2018.    FINDINGS:  A right occipital craniotomy with associated calvarial and soft tissue   changes are seen. Prominence of the right cerebellar convexity   extra-axial space is consistent with recent postoperative status. Lucency   along the right cerebellar hemisphere is slightly increased in  conspicuity without parenchymal hemorrhage.    Ventricles and sulci otherwise appear intact. There is otherwise no new   intracranial attenuation abnormality. There is no intraparenchymal   hematoma, mass effect or midline shift. No abnormal extra-axial fluid   collections are present. There is no evidence of acute transcortical   territorial infarction.    The calvarium is intact. The visualized intraorbital compartments,   paranasal sinuses and tympanomastoid cavities appear free of acute   disease.      IMPRESSION:  Stable exam.  No acute intracranial hemorrhage.        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 She presented unresponsive, seizure , now wide awake and neurologically intact.  Plan: 5 more days of iv vanco  update cbc with diff in am to be sure wbc is not too low from vanco

## 2018-04-26 NOTE — PROGRESS NOTE ADULT - PROBLEM SELECTOR PLAN 1
CoNS - anticipate ~14d total vanco course as per ID  monitor vanc trough - vanc dose now 1g Q 12 - level acceptable

## 2018-04-26 NOTE — PROGRESS NOTE ADULT - ASSESSMENT
HPI: 64 yo female s/p right retrosigmoid for vestibular schwannoma resection on 4/5/18 who was transferred from [facility] ED after she was reportedly found down and confused by mother who called 911.  Mother was not available at bedside at [facility] ED. At [place], per report, CSF showed , , glucose 98, protein 240. Febrile to 100.6, tachy to 156, 98% RA, 158/88.  Reportedly seized at OSH at was given 2 ativan.    PLAN:  -Continue Vancomycin 1g q12 via PICC for total of 14 days if repeat cultures remain negative (day 9/14 today) - check trough prior to 4th dose. Patient will remain in the hospital for remaining treatment as she has poor social support at home.   -Continue Keppra 1g BID for history of seizure upon admission  -Continue Labetalol 300 TID and Losartan for hx of HTN  -Continue Percocet PRN for pain control  -Derm follow up appreciated for erythematous excoriated papules on lower back and buttocks. Continue Triamcinolone cream BID.   -Hyponatremia resolved; off NaCl tabs  -Hypokalemia resolved  -Continue colace, senna for bowel regimen  -Encouraged mobilization  -Encouraged incentive spirometer  -DVT prophylaxis: SQL, venodynes  -Dispo: home PT/OT w/ straight cane  -[id number] HPI: 62 yo female s/p right retrosigmoid for vestibular schwannoma resection on 4/5/18 who was transferred from [facility] ED after she was reportedly found down and confused by mother who called 911.  Mother was not available at bedside at [facility] ED. At [place], per report, CSF showed , , glucose 98, protein 240. Febrile to 100.6, tachy to 156, 98% RA, 158/88.  Reportedly seized at OSH at was given 2 ativan.    PLAN:  -Continue Vancomycin 1g q12 via PICC for total of 14 days if repeat cultures remain negative (day 9/14 today) - check trough prior to 4th dose. Patient will remain in the hospital for remaining treatment as she has poor social support at home.   -Continue Keppra 1g BID for history of seizure upon admission  -Continue Labetalol 300 TID and Losartan for hx of HTN  -Continue Percocet PRN for pain control  -Derm follow up appreciated for erythematous excoriated papules on lower back and buttocks. Continue Triamcinolone cream BID.   -Hyponatremia resolved; off NaCl tabs  -Hypokalemia resolved  -Continue colace, senna for bowel regimen  -Encouraged mobilization  -Encouraged incentive spirometer  -DVT prophylaxis: SQL, venodynes  -Dispo: home PT/OT w/ straight cane  -[id number]

## 2018-04-27 LAB
BASOPHILS # BLD AUTO: 0.03 K/UL — SIGNIFICANT CHANGE UP (ref 0–0.2)
BASOPHILS NFR BLD AUTO: 0.6 % — SIGNIFICANT CHANGE UP (ref 0–2)
EOSINOPHIL # BLD AUTO: 0.29 K/UL — SIGNIFICANT CHANGE UP (ref 0–0.5)
EOSINOPHIL NFR BLD AUTO: 6 % — SIGNIFICANT CHANGE UP (ref 0–6)
FOLATE SERPL-MCNC: 17.1 NG/ML — SIGNIFICANT CHANGE UP
HCT VFR BLD CALC: 34.3 % — LOW (ref 34.5–45)
HGB BLD-MCNC: 11.5 G/DL — SIGNIFICANT CHANGE UP (ref 11.5–15.5)
IMM GRANULOCYTES NFR BLD AUTO: 0.6 % — SIGNIFICANT CHANGE UP (ref 0–1.5)
LYMPHOCYTES # BLD AUTO: 1.06 K/UL — SIGNIFICANT CHANGE UP (ref 1–3.3)
LYMPHOCYTES # BLD AUTO: 21.9 % — SIGNIFICANT CHANGE UP (ref 13–44)
MCHC RBC-ENTMCNC: 33.5 GM/DL — SIGNIFICANT CHANGE UP (ref 32–36)
MCHC RBC-ENTMCNC: 34.8 PG — HIGH (ref 27–34)
MCV RBC AUTO: 103.9 FL — HIGH (ref 80–100)
MONOCYTES # BLD AUTO: 0.77 K/UL — SIGNIFICANT CHANGE UP (ref 0–0.9)
MONOCYTES NFR BLD AUTO: 15.9 % — HIGH (ref 2–14)
NEUTROPHILS # BLD AUTO: 2.67 K/UL — SIGNIFICANT CHANGE UP (ref 1.8–7.4)
NEUTROPHILS NFR BLD AUTO: 55 % — SIGNIFICANT CHANGE UP (ref 43–77)
PLATELET # BLD AUTO: 224 K/UL — SIGNIFICANT CHANGE UP (ref 150–400)
RBC # BLD: 3.3 M/UL — LOW (ref 3.8–5.2)
RBC # FLD: 12.7 % — SIGNIFICANT CHANGE UP (ref 10.3–14.5)
VANCOMYCIN TROUGH SERPL-MCNC: 18.2 UG/ML — SIGNIFICANT CHANGE UP (ref 10–20)
VIT B12 SERPL-MCNC: 1330 PG/ML — HIGH (ref 232–1245)
WBC # BLD: 4.85 K/UL — SIGNIFICANT CHANGE UP (ref 3.8–10.5)
WBC # FLD AUTO: 4.85 K/UL — SIGNIFICANT CHANGE UP (ref 3.8–10.5)

## 2018-04-27 PROCEDURE — 70450 CT HEAD/BRAIN W/O DYE: CPT | Mod: 26

## 2018-04-27 PROCEDURE — 99232 SBSQ HOSP IP/OBS MODERATE 35: CPT

## 2018-04-27 RX ORDER — ACETAMINOPHEN 500 MG
1000 TABLET ORAL ONCE
Qty: 0 | Refills: 0 | Status: COMPLETED | OUTPATIENT
Start: 2018-04-27 | End: 2018-04-27

## 2018-04-27 RX ORDER — OXYCODONE AND ACETAMINOPHEN 5; 325 MG/1; MG/1
2 TABLET ORAL EVERY 4 HOURS
Qty: 0 | Refills: 0 | Status: DISCONTINUED | OUTPATIENT
Start: 2018-04-27 | End: 2018-04-28

## 2018-04-27 RX ADMIN — OXYCODONE AND ACETAMINOPHEN 2 TABLET(S): 5; 325 TABLET ORAL at 20:45

## 2018-04-27 RX ADMIN — LEVETIRACETAM 1000 MILLIGRAM(S): 250 TABLET, FILM COATED ORAL at 05:05

## 2018-04-27 RX ADMIN — NYSTATIN CREAM 1 APPLICATION(S): 100000 CREAM TOPICAL at 05:05

## 2018-04-27 RX ADMIN — Medication 1 APPLICATION(S): at 05:06

## 2018-04-27 RX ADMIN — OXYCODONE AND ACETAMINOPHEN 1 TABLET(S): 5; 325 TABLET ORAL at 10:09

## 2018-04-27 RX ADMIN — Medication 250 MILLIGRAM(S): at 09:41

## 2018-04-27 RX ADMIN — Medication 400 MILLIGRAM(S): at 10:44

## 2018-04-27 RX ADMIN — Medication 300 MILLIGRAM(S): at 05:05

## 2018-04-27 RX ADMIN — OXYCODONE AND ACETAMINOPHEN 2 TABLET(S): 5; 325 TABLET ORAL at 21:30

## 2018-04-27 RX ADMIN — LEVETIRACETAM 1000 MILLIGRAM(S): 250 TABLET, FILM COATED ORAL at 17:30

## 2018-04-27 RX ADMIN — ENOXAPARIN SODIUM 40 MILLIGRAM(S): 100 INJECTION SUBCUTANEOUS at 17:30

## 2018-04-27 RX ADMIN — OXYCODONE AND ACETAMINOPHEN 2 TABLET(S): 5; 325 TABLET ORAL at 05:05

## 2018-04-27 RX ADMIN — Medication 1 APPLICATION(S): at 19:01

## 2018-04-27 RX ADMIN — OXYCODONE AND ACETAMINOPHEN 2 TABLET(S): 5; 325 TABLET ORAL at 05:35

## 2018-04-27 RX ADMIN — Medication 300 MILLIGRAM(S): at 12:34

## 2018-04-27 RX ADMIN — OXYCODONE AND ACETAMINOPHEN 2 TABLET(S): 5; 325 TABLET ORAL at 00:31

## 2018-04-27 RX ADMIN — Medication 1 TABLET(S): at 12:34

## 2018-04-27 RX ADMIN — Medication 1 MILLIGRAM(S): at 12:34

## 2018-04-27 RX ADMIN — Medication 1000 MILLIGRAM(S): at 11:14

## 2018-04-27 RX ADMIN — OXYCODONE AND ACETAMINOPHEN 2 TABLET(S): 5; 325 TABLET ORAL at 17:00

## 2018-04-27 RX ADMIN — OXYCODONE AND ACETAMINOPHEN 1 TABLET(S): 5; 325 TABLET ORAL at 09:39

## 2018-04-27 RX ADMIN — OXYCODONE AND ACETAMINOPHEN 1 TABLET(S): 5; 325 TABLET ORAL at 16:38

## 2018-04-27 RX ADMIN — LOSARTAN POTASSIUM 25 MILLIGRAM(S): 100 TABLET, FILM COATED ORAL at 05:05

## 2018-04-27 RX ADMIN — OXYCODONE AND ACETAMINOPHEN 2 TABLET(S): 5; 325 TABLET ORAL at 01:00

## 2018-04-27 RX ADMIN — NYSTATIN CREAM 1 APPLICATION(S): 100000 CREAM TOPICAL at 19:01

## 2018-04-27 RX ADMIN — Medication 250 MILLIGRAM(S): at 21:12

## 2018-04-27 NOTE — PROGRESS NOTE ADULT - SUBJECTIVE AND OBJECTIVE BOX
Subjective: Patient seen and examined. No new events except as noted.     SUBJECTIVE/ROS:  pos HA  no cp       MEDICATIONS:  MEDICATIONS  (STANDING):  enoxaparin Injectable 40 milliGRAM(s) SubCutaneous <User Schedule>  folic acid 1 milliGRAM(s) Oral daily  labetalol 300 milliGRAM(s) Oral two times a day  levETIRAcetam 1000 milliGRAM(s) Oral two times a day  losartan 25 milliGRAM(s) Oral daily  multivitamin 1 Tablet(s) Oral daily  nystatin Cream 1 Application(s) Topical two times a day  thiamine 300 milliGRAM(s) Oral daily  triamcinolone 0.1% Cream 1 Application(s) Topical two times a day  vancomycin  IVPB 1000 milliGRAM(s) IV Intermittent every 12 hours      PHYSICAL EXAM:  T(C): 36.5 (04-27-18 @ 12:45), Max: 36.9 (04-26-18 @ 19:52)  HR: 69 (04-27-18 @ 12:45) (69 - 123)  BP: 109/70 (04-27-18 @ 12:45) (106/73 - 148/106)  RR: 18 (04-27-18 @ 12:45) (18 - 18)  SpO2: 96% (04-27-18 @ 12:45) (93% - 99%)  Wt(kg): --  I&O's Summary    26 Apr 2018 07:01  -  27 Apr 2018 07:00  --------------------------------------------------------  IN: 1040 mL / OUT: 0 mL / NET: 1040 mL        JVP: Normal  Neck: supple  Lung: clear   CV: S1 S2 , Murmur:  Abd: soft  Ext: No edema  neuro: Awake / alert  Psych: flat affect  Skin: normal       LABS/DATA:    CARDIAC MARKERS:                                11.5   4.85  )-----------( 224      ( 27 Apr 2018 07:38 )             34.3           proBNP:   Lipid Profile:   HgA1c:   TSH:     TELE:  EKG:

## 2018-04-27 NOTE — PROGRESS NOTE ADULT - SUBJECTIVE AND OBJECTIVE BOX
CC: f/u for cns post neurosurgical meningitis    Patient reports  she has a headache  REVIEW OF SYSTEMS:  All other review of systems negative (Comprehensive ROS)    Antimicrobials Day #  :10/14  vancomycin  IVPB 1000 milliGRAM(s) IV Intermittent every 12 hours    Other Medications Reviewed    T(F): 97.8 (04-27-18 @ 16:49), Max: 98.4 (04-26-18 @ 19:52)  HR: 84 (04-27-18 @ 16:49)  BP: 100/63 (04-27-18 @ 16:49)  RR: 18 (04-27-18 @ 16:49)  SpO2: 96% (04-27-18 @ 16:49)  Wt(kg): --    PHYSICAL EXAM:  General: alert, no acute distress. head wound is clean, dry , not fluctuant and intact  Eyes:  anicteric, no conjunctival injection, no discharge  Oropharynx: no lesions or injection 	  Neck: supple, without adenopathy  Lungs: clear to auscultation  Heart: regular rate and rhythm; no murmur, rubs or gallops  Abdomen: soft, nondistended, nontender, without mass or organomegaly  Skin:papules on back lesions  Extremities: no clubbing, cyanosis, or edema  Neurologic: alert, oriented, moves all extremities    LAB RESULTS:                        11.5   4.85  )-----------( 224      ( 27 Apr 2018 07:38 )             34.3     Vancomycin Level, Trough -Pre 4th Dose, order if dosed q6/8/12h (04.26.18 @ 23:51)    Vancomycin Level, Trough: 18.2:       MICROBIOLOGY:  RECENT CULTURES:  Culture - Blood (04.17.18 @ 19:16)    Specimen Source: .Blood Blood    Culture Results:   No growth at 5 days.        RADIOLOGY REVIEWED:  < from: CT Head No Cont (04.27.18 @ 10:59) >  Impression:    Postop changes are again seen as described above.      < end of copied text >      Assessment:  Patient s/p recent  craniotomy for schwannoma , was found unresponsive at home, taken to osh. LP grew cns, patient sent here and is on treatment for coag neg staph post neurosurgical meningitis. Mental status is intact  Plan: complete course of iv vanco as outlined. she needs 4 more days  monitor wbc, renal fx

## 2018-04-27 NOTE — PROVIDER CONTACT NOTE (OTHER) - ASSESSMENT
Upon examination pt does not have teeth where the pt is reporting pain. Pt disoriented to situation with NDx4. Pt reports having this pain for "a week and a half"
Pt restless screaming of pain. BP elevated. Neurologically unchanged. VS stable.
Resting in bed, verbalizes pain relief from ordered meds, no other complaints.

## 2018-04-27 NOTE — PROVIDER CONTACT NOTE (OTHER) - SITUATION
Pt moaning in pain after receiving 2 tablets of percocet
Patients /92 heart rate 76.
Pt in severe pain, incision leaking clear/yellow fluid. pillow saturated

## 2018-04-27 NOTE — PROGRESS NOTE ADULT - SUBJECTIVE AND OBJECTIVE BOX
SUBJECTIVE: Patient seen and examined at bedside. Complains of headaches which are unchanged. Denies headaches, shortness of breath    Vital Signs Last 24 Hrs  T(C): 36.7 (04-27-18 @ 05:05), Max: 36.9 (04-26-18 @ 19:52)  T(F): 98.1 (04-27-18 @ 05:05), Max: 98.4 (04-26-18 @ 19:52)  HR: 123 (04-27-18 @ 05:05) (68 - 123)  BP: 148/106 (04-27-18 @ 05:05) (109/71 - 148/106)  RR: 18 (04-27-18 @ 05:05) (18 - 18)  SpO2: 96% (04-27-18 @ 05:05) (93% - 99%)    PHYSICAL EXAM:    Constitutional: No Acute Distress, resting comfortably in bed    Neurological: Awake, alert, oriented to person, place and time, +right facial, speech slightly dysarthric, no drift, tongue midline, moving all extremities with 5/5 strength, sensation intact to light touch on all extremities    Incision: +right retrosigmoid incision site clean and dry without drainage; +suture in place C/D/I    Pulmonary: Clear to Auscultation, No rales, No rhonchi, No wheezes     Cardiovascular: S1, S2, Regular rate and rhythm     Gastrointestinal: Soft, Non-tender, Non-distended     Extremities: No calf tenderness bilaterally; +left PICC in place, extremities warm      LABS:                           11.6   3.3   )-----------( 195      ( 25 Apr 2018 08:37 )             33.3   04-25    139  |  100  |  7   ----------------------------<  88  3.5   |  28  |  0.82    Ca    9.6      25 Apr 2018 08:37      IMAGING: no new imaging      MEDICATIONS  (STANDING):  enoxaparin Injectable 40 milliGRAM(s) SubCutaneous <User Schedule>  folic acid 1 milliGRAM(s) Oral daily  labetalol 300 milliGRAM(s) Oral two times a day  levETIRAcetam 1000 milliGRAM(s) Oral two times a day  losartan 25 milliGRAM(s) Oral daily  multivitamin 1 Tablet(s) Oral daily  nystatin Cream 1 Application(s) Topical two times a day  thiamine 300 milliGRAM(s) Oral daily  triamcinolone 0.1% Cream 1 Application(s) Topical two times a day  vancomycin  IVPB 1000 milliGRAM(s) IV Intermittent every 12 hours    MEDICATIONS  (PRN):  acetaminophen   Tablet. 650 milliGRAM(s) Oral every 6 hours PRN Mild Pain (1 - 3)  ondansetron   Disintegrating Tablet 4 milliGRAM(s) Oral every 6 hours PRN Nausea  oxyCODONE    5 mG/acetaminophen 325 mG 1 Tablet(s) Oral every 4 hours PRN Moderate Pain (4 - 6)        DIET: regular diet

## 2018-04-27 NOTE — PROVIDER CONTACT NOTE (OTHER) - BACKGROUND
Pt admitted d/t alerted mental status and s/p fall. Pt reporting 10/10 tooth pain.
Patient s/p crani 4/5 readmitted with AMS and seizures.
pt s/p retro sigmoid crani for tumor resection 4/5. pt readmit 4/17 for meningitis

## 2018-04-27 NOTE — CHART NOTE - NSCHARTNOTEFT_GEN_A_CORE
Called by nurse to bedside for patient complaining of persistent headaches and moaning in pain despite receiving 2 Percocets. Upon examination, patient is at her neurologic baseline which is awake, alert, oriented x 3, following commands, right facial droops, moving all extremities with 5/5 strength. She angrily states she has had a headache for a week and states it is the same headache it has been since the surgery. However, she is moaning in pain. Will order stat CT head to rule out hydrocephalus or any other acute changes.

## 2018-04-27 NOTE — PROVIDER CONTACT NOTE (OTHER) - ACTION/TREATMENT ORDERED:
IV Tylenol administered as ordered. Pt transported to urgent CT
10mg Hydralazing IVP will reassess after dose.
dressing applied by PA. IV Tylenol and Oxycodone given for pain. will continue to monitor

## 2018-04-27 NOTE — PROGRESS NOTE ADULT - ASSESSMENT
HPI: 62 yo female s/p right retrosigmoid for vestibular schwannoma resection on 4/5/18 who was transferred from [facility] ED after she was reportedly found down and confused by mother who called 911.  Mother was not available at bedside at [facility] ED. At [place], per report, CSF showed , , glucose 98, protein 240. Febrile to 100.6, tachy to 156, 98% RA, 158/88.  Reportedly seized at OSH at was given 2 ativan.    PLAN:  -Continue Vancomycin 1g q12 via PICC for total of 14 days if repeat cultures remain negative (day 10/14 today) - check trough prior to 4th dose. Patient will remain in the hospital for remaining treatment as she has poor social support at home.   -Continue Keppra 1g BID for history of seizure upon admission  -Continue Labetalol 300 TID and Losartan for hx of HTN  -Continue Percocet PRN for pain control  -Continue Triamcinolone cream BID per derm for erythematous excoriated papules on lower back and buttocks. .   -Hyponatremia resolved; off NaCl tabs  -Hypokalemia resolved  -Continue colace, senna for bowel regimen  -Encouraged mobilization  -Encouraged incentive spirometer  -DVT prophylaxis: SQL, venodynes  -Dispo: home PT/OT w/ straight cane  -Will discuss with Dr. Jacobo    [facility] # [id number]

## 2018-04-27 NOTE — PROGRESS NOTE ADULT - ASSESSMENT
NSVT   keep electrolytes in normal range  no evidence of MI  awaiting echo  agree with BB    meningitis   anbx  fu with ID    HTN  labile  cont current meds     HA  fu with head ct and neurology

## 2018-04-28 LAB — VANCOMYCIN TROUGH SERPL-MCNC: 18.6 UG/ML — SIGNIFICANT CHANGE UP (ref 10–20)

## 2018-04-28 PROCEDURE — 99233 SBSQ HOSP IP/OBS HIGH 50: CPT

## 2018-04-28 RX ORDER — OXYCODONE HYDROCHLORIDE 5 MG/1
10 TABLET ORAL
Qty: 0 | Refills: 0 | Status: DISCONTINUED | OUTPATIENT
Start: 2018-04-28 | End: 2018-05-02

## 2018-04-28 RX ORDER — HYDROMORPHONE HYDROCHLORIDE 2 MG/ML
0.5 INJECTION INTRAMUSCULAR; INTRAVENOUS; SUBCUTANEOUS EVERY 4 HOURS
Qty: 0 | Refills: 0 | Status: DISCONTINUED | OUTPATIENT
Start: 2018-04-28 | End: 2018-04-30

## 2018-04-28 RX ORDER — POLYETHYLENE GLYCOL 3350 17 G/17G
17 POWDER, FOR SOLUTION ORAL DAILY
Qty: 0 | Refills: 0 | Status: DISCONTINUED | OUTPATIENT
Start: 2018-04-28 | End: 2018-05-02

## 2018-04-28 RX ORDER — SENNA PLUS 8.6 MG/1
2 TABLET ORAL AT BEDTIME
Qty: 0 | Refills: 0 | Status: DISCONTINUED | OUTPATIENT
Start: 2018-04-28 | End: 2018-05-02

## 2018-04-28 RX ORDER — DOCUSATE SODIUM 100 MG
100 CAPSULE ORAL THREE TIMES A DAY
Qty: 0 | Refills: 0 | Status: DISCONTINUED | OUTPATIENT
Start: 2018-04-28 | End: 2018-05-02

## 2018-04-28 RX ADMIN — Medication 650 MILLIGRAM(S): at 14:33

## 2018-04-28 RX ADMIN — ENOXAPARIN SODIUM 40 MILLIGRAM(S): 100 INJECTION SUBCUTANEOUS at 18:17

## 2018-04-28 RX ADMIN — Medication 300 MILLIGRAM(S): at 05:00

## 2018-04-28 RX ADMIN — Medication 1 APPLICATION(S): at 18:18

## 2018-04-28 RX ADMIN — LOSARTAN POTASSIUM 25 MILLIGRAM(S): 100 TABLET, FILM COATED ORAL at 05:00

## 2018-04-28 RX ADMIN — Medication 300 MILLIGRAM(S): at 18:17

## 2018-04-28 RX ADMIN — Medication 650 MILLIGRAM(S): at 09:30

## 2018-04-28 RX ADMIN — Medication 250 MILLIGRAM(S): at 12:08

## 2018-04-28 RX ADMIN — OXYCODONE HYDROCHLORIDE 10 MILLIGRAM(S): 5 TABLET ORAL at 19:37

## 2018-04-28 RX ADMIN — OXYCODONE AND ACETAMINOPHEN 2 TABLET(S): 5; 325 TABLET ORAL at 01:20

## 2018-04-28 RX ADMIN — OXYCODONE AND ACETAMINOPHEN 2 TABLET(S): 5; 325 TABLET ORAL at 00:50

## 2018-04-28 RX ADMIN — OXYCODONE HYDROCHLORIDE 10 MILLIGRAM(S): 5 TABLET ORAL at 10:05

## 2018-04-28 RX ADMIN — OXYCODONE AND ACETAMINOPHEN 2 TABLET(S): 5; 325 TABLET ORAL at 04:56

## 2018-04-28 RX ADMIN — HYDROMORPHONE HYDROCHLORIDE 0.5 MILLIGRAM(S): 2 INJECTION INTRAMUSCULAR; INTRAVENOUS; SUBCUTANEOUS at 19:58

## 2018-04-28 RX ADMIN — Medication 1 TABLET(S): at 12:08

## 2018-04-28 RX ADMIN — Medication 1 APPLICATION(S): at 05:00

## 2018-04-28 RX ADMIN — Medication 250 MILLIGRAM(S): at 21:14

## 2018-04-28 RX ADMIN — Medication 300 MILLIGRAM(S): at 12:08

## 2018-04-28 RX ADMIN — LEVETIRACETAM 1000 MILLIGRAM(S): 250 TABLET, FILM COATED ORAL at 05:01

## 2018-04-28 RX ADMIN — HYDROMORPHONE HYDROCHLORIDE 0.5 MILLIGRAM(S): 2 INJECTION INTRAMUSCULAR; INTRAVENOUS; SUBCUTANEOUS at 21:00

## 2018-04-28 RX ADMIN — OXYCODONE HYDROCHLORIDE 10 MILLIGRAM(S): 5 TABLET ORAL at 18:17

## 2018-04-28 RX ADMIN — OXYCODONE AND ACETAMINOPHEN 2 TABLET(S): 5; 325 TABLET ORAL at 05:30

## 2018-04-28 RX ADMIN — NYSTATIN CREAM 1 APPLICATION(S): 100000 CREAM TOPICAL at 18:17

## 2018-04-28 RX ADMIN — NYSTATIN CREAM 1 APPLICATION(S): 100000 CREAM TOPICAL at 05:00

## 2018-04-28 RX ADMIN — Medication 650 MILLIGRAM(S): at 15:20

## 2018-04-28 RX ADMIN — OXYCODONE HYDROCHLORIDE 10 MILLIGRAM(S): 5 TABLET ORAL at 10:35

## 2018-04-28 RX ADMIN — OXYCODONE HYDROCHLORIDE 10 MILLIGRAM(S): 5 TABLET ORAL at 13:14

## 2018-04-28 RX ADMIN — HYDROMORPHONE HYDROCHLORIDE 0.5 MILLIGRAM(S): 2 INJECTION INTRAMUSCULAR; INTRAVENOUS; SUBCUTANEOUS at 02:00

## 2018-04-28 RX ADMIN — OXYCODONE HYDROCHLORIDE 10 MILLIGRAM(S): 5 TABLET ORAL at 23:43

## 2018-04-28 RX ADMIN — Medication 1 MILLIGRAM(S): at 12:08

## 2018-04-28 RX ADMIN — LEVETIRACETAM 1000 MILLIGRAM(S): 250 TABLET, FILM COATED ORAL at 18:17

## 2018-04-28 RX ADMIN — Medication 650 MILLIGRAM(S): at 09:00

## 2018-04-28 RX ADMIN — OXYCODONE HYDROCHLORIDE 10 MILLIGRAM(S): 5 TABLET ORAL at 14:00

## 2018-04-28 RX ADMIN — HYDROMORPHONE HYDROCHLORIDE 0.5 MILLIGRAM(S): 2 INJECTION INTRAMUSCULAR; INTRAVENOUS; SUBCUTANEOUS at 01:29

## 2018-04-28 NOTE — PROGRESS NOTE ADULT - SUBJECTIVE AND OBJECTIVE BOX
Patient is a 63y old  Female who presents with a chief complaint of AMS (17 Apr 2018 16:35)        SUBJECTIVE / OVERNIGHT EVENTS: c/o headache      MEDICATIONS  (STANDING):  docusate sodium 100 milliGRAM(s) Oral three times a day  enoxaparin Injectable 40 milliGRAM(s) SubCutaneous <User Schedule>  folic acid 1 milliGRAM(s) Oral daily  labetalol 300 milliGRAM(s) Oral two times a day  levETIRAcetam 1000 milliGRAM(s) Oral two times a day  losartan 25 milliGRAM(s) Oral daily  multivitamin 1 Tablet(s) Oral daily  nystatin Cream 1 Application(s) Topical two times a day  polyethylene glycol 3350 17 Gram(s) Oral daily  senna 2 Tablet(s) Oral at bedtime  thiamine 300 milliGRAM(s) Oral daily  triamcinolone 0.1% Cream 1 Application(s) Topical two times a day  vancomycin  IVPB 1000 milliGRAM(s) IV Intermittent every 12 hours    MEDICATIONS  (PRN):  acetaminophen   Tablet. 650 milliGRAM(s) Oral every 6 hours PRN Mild Pain (1 - 3)  HYDROmorphone  Injectable 0.5 milliGRAM(s) IV Push every 4 hours PRN breakthrough pain  ondansetron   Disintegrating Tablet 4 milliGRAM(s) Oral every 6 hours PRN Nausea  oxyCODONE    IR 10 milliGRAM(s) Oral every 3 hours PRN Moderate Pain (4 - 6)      Vital Signs Last 24 Hrs  T(C): 36.3 (28 Apr 2018 08:27), Max: 36.8 (27 Apr 2018 19:55)  T(F): 97.3 (28 Apr 2018 08:27), Max: 98.3 (27 Apr 2018 19:55)  HR: 72 (28 Apr 2018 08:27) (69 - 84)  BP: 108/75 (28 Apr 2018 08:27) (100/63 - 149/89)  BP(mean): --  RR: 18 (28 Apr 2018 08:27) (18 - 18)  SpO2: 95% (28 Apr 2018 08:27) (92% - 96%)  CAPILLARY BLOOD GLUCOSE        I&O's Summary    27 Apr 2018 07:01  -  28 Apr 2018 07:00  --------------------------------------------------------  IN: 480 mL / OUT: 0 mL / NET: 480 mL        PHYSICAL EXAM  HEENT: s/p right occipital crani, MMM, neck supple, no JVD  EYES: EOMI, PERRLA, conjunctiva and sclera clear  CHEST/LUNG: Clear to auscultation bilaterally; No wheeze, rhonchi or rales.  HEART: S1, S2, rrr; No murmurs, rubs, or gallops  ABDOMEN: Soft, Nontender, Nondistended; Bowel sounds present  EXTREMITIES:  2+ Peripheral Pulses, No clubbing, cyanosis, or edema  PSYCH: calm, limited affect  NEUROLOGY: AOx3  SKIN: unchanged    LABS:                        11.5   4.85  )-----------( 224      ( 27 Apr 2018 07:38 )             34.3                     RADIOLOGY & ADDITIONAL TESTS:    Imaging Personally Reviewed: CT head 4/27 reviewed - Postop changes compatible with a right suboccipital craniotomy is again   seen. Extra-axial fluid in the postop region is again seen and measures   approximately 9.0 mm (previously measured approximately 8.1 mm.    Consultant(s) Notes Reviewed:    Care Discussed with Consultants/Other Providers: d/w Neurosurgery PA - Michelle regarding plan of care Patient is a 63y old  Female who presents with a chief complaint of AMS (17 Apr 2018 16:35)        SUBJECTIVE / OVERNIGHT EVENTS: c/o headache 7/10 improved with oxycodone      MEDICATIONS  (STANDING):  docusate sodium 100 milliGRAM(s) Oral three times a day  enoxaparin Injectable 40 milliGRAM(s) SubCutaneous <User Schedule>  folic acid 1 milliGRAM(s) Oral daily  labetalol 300 milliGRAM(s) Oral two times a day  levETIRAcetam 1000 milliGRAM(s) Oral two times a day  losartan 25 milliGRAM(s) Oral daily  multivitamin 1 Tablet(s) Oral daily  nystatin Cream 1 Application(s) Topical two times a day  polyethylene glycol 3350 17 Gram(s) Oral daily  senna 2 Tablet(s) Oral at bedtime  thiamine 300 milliGRAM(s) Oral daily  triamcinolone 0.1% Cream 1 Application(s) Topical two times a day  vancomycin  IVPB 1000 milliGRAM(s) IV Intermittent every 12 hours    MEDICATIONS  (PRN):  acetaminophen   Tablet. 650 milliGRAM(s) Oral every 6 hours PRN Mild Pain (1 - 3)  HYDROmorphone  Injectable 0.5 milliGRAM(s) IV Push every 4 hours PRN breakthrough pain  ondansetron   Disintegrating Tablet 4 milliGRAM(s) Oral every 6 hours PRN Nausea  oxyCODONE    IR 10 milliGRAM(s) Oral every 3 hours PRN Moderate Pain (4 - 6)      Vital Signs Last 24 Hrs  T(C): 36.3 (28 Apr 2018 08:27), Max: 36.8 (27 Apr 2018 19:55)  T(F): 97.3 (28 Apr 2018 08:27), Max: 98.3 (27 Apr 2018 19:55)  HR: 72 (28 Apr 2018 08:27) (69 - 84)  BP: 108/75 (28 Apr 2018 08:27) (100/63 - 149/89)  BP(mean): --  RR: 18 (28 Apr 2018 08:27) (18 - 18)  SpO2: 95% (28 Apr 2018 08:27) (92% - 96%)  CAPILLARY BLOOD GLUCOSE        I&O's Summary    27 Apr 2018 07:01  -  28 Apr 2018 07:00  --------------------------------------------------------  IN: 480 mL / OUT: 0 mL / NET: 480 mL        PHYSICAL EXAM  HEENT: s/p right occipital crani, MMM, neck supple, no JVD  EYES: EOMI, PERRLA, conjunctiva and sclera clear  CHEST/LUNG: Clear to auscultation bilaterally; No wheeze, rhonchi or rales.  HEART: S1, S2, rrr; No murmurs, rubs, or gallops  ABDOMEN: Soft, Nontender, Nondistended; Bowel sounds present  EXTREMITIES:  2+ Peripheral Pulses, No clubbing, cyanosis, or edema  PSYCH: calm, limited affect  NEUROLOGY: AOx3  SKIN: unchanged    LABS:                        11.5   4.85  )-----------( 224      ( 27 Apr 2018 07:38 )             34.3                     RADIOLOGY & ADDITIONAL TESTS:    Imaging Personally Reviewed: CT head 4/27 reviewed - Postop changes compatible with a right suboccipital craniotomy is again   seen. Extra-axial fluid in the postop region is again seen and measures   approximately 9.0 mm (previously measured approximately 8.1 mm.    Consultant(s) Notes Reviewed:    Care Discussed with Consultants/Other Providers: d/w Neurosurgery PA - Michelle regarding plan of care

## 2018-04-28 NOTE — PROGRESS NOTE ADULT - SUBJECTIVE AND OBJECTIVE BOX
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    Patient reports right sided headaches, no photophobia or n/v.    REVIEW OF SYSTEMS:  All other review of systems negative (Comprehensive ROS)    Antimicrobials Day #  :11/14  vancomycin  IVPB 1000 milliGRAM(s) IV Intermittent every 12 hours    Other Medications Reviewed    T(F): 98.1 (04-28-18 @ 12:48), Max: 98.3 (04-27-18 @ 19:55)  HR: 75 (04-28-18 @ 12:48)  BP: 134/89 (04-28-18 @ 12:48)  RR: 18 (04-28-18 @ 12:48)  SpO2: 93% (04-28-18 @ 12:48)  Wt(kg): --    PHYSICAL EXAM:  General: alert, no acute distress, Rt craniotomy incision is c/d/i  Eyes:  anicteric, no conjunctival injection, no discharge  Oropharynx: no lesions or injection 	  Neck: supple, without adenopathy  Lungs: clear to auscultation  Heart: regular rate and rhythm; no murmur, rubs or gallops  Abdomen: soft, nondistended, nontender, without mass or organomegaly  Skin: no lesions  Extremities: no clubbing, cyanosis, or edema  Neurologic: alert, oriented, moves all extremities    LAB RESULTS:                        11.5   4.85  )-----------( 224      ( 27 Apr 2018 07:38 )             34.3       Vanco trough 18.6        MICROBIOLOGY:  RECENT CULTURES:      RADIOLOGY REVIEWED:  < from: CT Head No Cont (04.27.18 @ 10:59) >  PROCEDURE DATE:  04/27/2018            INTERPRETATION:  History: Status post craniotomy for tumor resection.    Multiple axial sections were performed from base of skull to vertex   without contrast enhancement. Sagittal reconstructions were performed as   well    This exam is compared with prior noncontrast head CT performed on April 19, 2018.    Postop changes compatible with a right suboccipital craniotomy is again   seen. Extra-axial fluid in thepostop region is again seen and measures   approximately 9.0 mm (previously measured approximately 8.1 mm.    Asymmetry of the lateral ventricles are seen with the left lateral   ventricle more prominent than the right.    There is no evidence of acute hemorrhage, mass or mass effect seen.    Bilateral maxillary sinus ethmoid and sphenoid sinus mucosal thickening   is seen.    Both mastoid and middle ear regions appear clear    Impression:    Postop changes are again seen as described above.

## 2018-04-28 NOTE — PROGRESS NOTE ADULT - ASSESSMENT
HPI: 62 yo female s/p right retrosigmoid for vestibular schwannoma resection on 4/5/18 who was transferred from [facility] ED after she was reportedly found down and confused by mother who called 911.  Mother was not available at bedside at [facility] ED. At [place], per report, CSF showed , , glucose 98, protein 240. Febrile to 100.6, tachy to 156, 98% RA, 158/88.  Reportedly seized at OSH at was given 2 ativan.    PLAN:  -Continue Vancomycin 1g q12 via PICC for total of 14 days if repeat cultures remain negative (day 11/14 today) - trough 18.6;  Patient will remain in the hospital for remaining treatment as she has poor social support at home.   -Continue Keppra 1g BID for history of seizure upon admission  -Continue Labetalol 300 TID and Losartan for hx of HTN  -will change to oxycodone 10mg every 3hrs as needed for pain to allow for IV tylenol to avoid IV dilaudid given sever headaches at times  -Continue Triamcinolone cream BID per derm for erythematous excoriated papules on lower back and buttocks. .   -Hyponatremia resolved; off NaCl tabs  -Hypokalemia resolved  -Continue colace, senna for bowel regimen  -Encouraged mobilization  -Encouraged incentive spirometer  -DVT prophylaxis: SQL, venodynes  -Dispo: home PT/OT w/ straight cane  -[id number] HPI: 62 yo female s/p right retrosigmoid for vestibular schwannoma resection on 4/5/18 who was transferred from [facility] ED after she was reportedly found down and confused by mother who called 911.  Mother was not available at bedside at [facility] ED. At [place], per report, CSF showed , , glucose 98, protein 240. Febrile to 100.6, tachy to 156, 98% RA, 158/88.  Reportedly seized at OSH at was given 2 ativan.    PLAN:  -Continue Vancomycin 1g q12 via PICC for total of 14 days if repeat cultures remain negative (day 11/14 today) - trough 18.6;  Patient will remain in the hospital for remaining treatment as she has poor social support at home.   ID consult following  -Continue Keppra 1g BID for history of seizure upon admission  -Continue Labetalol 300 TID and Losartan for hx of HTN  -will change to oxycodone 10mg every 3hrs as needed for pain to allow for IV tylenol to avoid IV dilaudid given sever headaches at times  -Continue Triamcinolone cream BID per derm for erythematous excoriated papules on lower back and buttocks. .   -Hyponatremia resolved; off NaCl tabs  -Hypokalemia resolved  -Continue colace, senna for bowel regimen  -Encouraged mobilization  -Encouraged incentive spirometer  -DVT prophylaxis: SQL, venodynes  -Dispo: home PT/OT w/ straight cane  Hospitalist Medicine following     [id number] HPI: 62 yo female s/p right retrosigmoid for vestibular schwannoma resection on 4/5/18 who was transferred from [facility] ED after she was reportedly found down and confused by mother who called 911.  Mother was not available at bedside at [facility] ED. At [place], per report, CSF showed , , glucose 98, protein 240. Febrile to 100.6, tachy to 156, 98% RA, 158/88.  Reportedly seized at OSH at was given 2 ativan.    PLAN:  -Continue Vancomycin 1g q12 via PICC for total of 14 days if repeat cultures remain negative (day 11/14 today) - trough 18.6;  Patient will remain in the hospital for remaining treatment as she has poor social support at home.   ID consult following  -Continue Keppra 1g BID for history of seizure upon admission  -Continue Labetalol 300 TID and Losartan for hx of HTN  -will change to oxycodone 10mg every 3hrs as needed for pain to allow for IV tylenol to avoid IV dilaudid given sever headaches at times  -Continue Triamcinolone cream BID per derm for erythematous excoriated papules on lower back and buttocks. .   -Hyponatremia resolved; off NaCl tabs  -Hypokalemia resolved  -Continue colace, senna for bowel regimen  -Encouraged mobilization  -Encouraged incentive spirometer  -DVT prophylaxis: SQL, venodynes  Hosptilalist Medicine and cardiology following in consult  -Dispo: home PT/OT w/ straight cane  Hospitalist Medicine following     [id number]

## 2018-04-28 NOTE — PROGRESS NOTE ADULT - ASSESSMENT
62 yo f hx R occipital crani for resection of vestibular schwanomma, POD#13, now transferred from [facility], after found unresponsive, confused, started on empiric Vanco and Ceftriaxone.    Cultures obtained at [facility] lab [phone] with coag negative staph in CSF.  She has had her coverage narrowed to vancomycin  Fevers have resolved, still c/o headaches.  Will defer to NS on whether CT fluid is of concern  Suggest:  1.vanco level is acceptable, limit to 3 more days.  2.Headache management per NS, ? if there is a role for repeat LP  3.Continue same does of vancomycin

## 2018-04-28 NOTE — PROGRESS NOTE ADULT - PROBLEM SELECTOR PLAN 1
CoNS - anticipate ~14d total vanco course as per ID  monitor vanc trough - vanc dose now 1g Q 12 - level acceptable CoNS - anticipate ~14d total vanco course as per ID  monitor vanc trough - vanc dose now 1g Q 12 - level acceptable - Day 11

## 2018-04-28 NOTE — PROGRESS NOTE ADULT - SUBJECTIVE AND OBJECTIVE BOX
Subjective: Patient seen and examined. No new events except as noted.     SUBJECTIVE/ROS:  feels ok  No chest pain, dyspnea, palpitation, or dizziness.       MEDICATIONS:  MEDICATIONS  (STANDING):  docusate sodium 100 milliGRAM(s) Oral three times a day  enoxaparin Injectable 40 milliGRAM(s) SubCutaneous <User Schedule>  folic acid 1 milliGRAM(s) Oral daily  labetalol 300 milliGRAM(s) Oral two times a day  levETIRAcetam 1000 milliGRAM(s) Oral two times a day  losartan 25 milliGRAM(s) Oral daily  multivitamin 1 Tablet(s) Oral daily  nystatin Cream 1 Application(s) Topical two times a day  polyethylene glycol 3350 17 Gram(s) Oral daily  senna 2 Tablet(s) Oral at bedtime  thiamine 300 milliGRAM(s) Oral daily  triamcinolone 0.1% Cream 1 Application(s) Topical two times a day  vancomycin  IVPB 1000 milliGRAM(s) IV Intermittent every 12 hours      PHYSICAL EXAM:  T(C): 36.7 (04-28-18 @ 12:48), Max: 36.8 (04-27-18 @ 19:55)  HR: 75 (04-28-18 @ 12:48) (72 - 84)  BP: 134/89 (04-28-18 @ 12:48) (100/63 - 149/89)  RR: 18 (04-28-18 @ 12:48) (18 - 18)  SpO2: 93% (04-28-18 @ 12:48) (92% - 96%)  Wt(kg): --  I&O's Summary    27 Apr 2018 07:01  -  28 Apr 2018 07:00  --------------------------------------------------------  IN: 480 mL / OUT: 0 mL / NET: 480 mL          Appearance: Normal	  HEENT:   Normal oral mucosa, PERRL, EOMI	  Cardiovascular: Normal S1 S2,    Murmur:   Neck: JVP normal  Respiratory: Lungs clear to auscultation  Gastrointestinal:  Soft, Non-tender, + BS	  Skin: normal   Neuro: No gross deficits.   Psychiatry:  Mood & affect appropriate  Ext: No edema      LABS/DATA:    CARDIAC MARKERS:                                11.5   4.85  )-----------( 224      ( 27 Apr 2018 07:38 )             34.3           proBNP:   Lipid Profile:   HgA1c:   TSH:     TELE:  EKG:

## 2018-04-28 NOTE — PROGRESS NOTE ADULT - ASSESSMENT
63F with Right vestibular schwannoma s/p R occipital crani for resection was reportedly found down and confused by mother. Transferred from [facility] for treatment of possible meningitis (coag neg staph) and seizures.

## 2018-04-28 NOTE — PROGRESS NOTE ADULT - SUBJECTIVE AND OBJECTIVE BOX
SUBJECTIVE: Patient seen and examined at bedside. Complains of severe headaches which are seemingly unchanged and not positional.  Denies headaches, shortness of breath    Vital Signs Last 24 Hrs  T(C): 36.7 (28 Apr 2018 12:48), Max: 36.8 (27 Apr 2018 19:55)  T(F): 98.1 (28 Apr 2018 12:48), Max: 98.3 (27 Apr 2018 19:55)  HR: 75 (28 Apr 2018 12:48) (72 - 84)  BP: 134/89 (28 Apr 2018 12:48) (100/63 - 149/89)  BP(mean): --  RR: 18 (28 Apr 2018 12:48) (18 - 18)  SpO2: 93% (28 Apr 2018 12:48) (92% - 96%)    PHYSICAL EXAM:    Constitutional: No Acute Distress, resting comfortably in bed after earlier    Neurological: Awake, alert, oriented to person, place and time, +right facial, speech slightly dysarthric, no drift, tongue midline, moving all extremities with 5/5 strength, sensation intact to light touch on all extremities    Incision: +right retrosigmoid incision site clean and dry without drainage; +suture in place C/D/I    Pulmonary: Clear to Auscultation, No rales, No rhonchi, No wheezes     Cardiovascular: S1, S2, Regular rate and rhythm     Gastrointestinal: Soft, Non-tender, Non-distended     Extremities: No calf tenderness bilaterally; +left PICC in place, extremities warm      LABS:                           11.6   3.3   )-----------( 195      ( 25 Apr 2018 08:37 )             33.3   04-25    139  |  100  |  7   ----------------------------<  88  3.5   |  28  |  0.82    Ca    9.6      25 Apr 2018 08:37    Vancomycin Level, Trough: 18.6: Vancomycin trough levels should be rapidly reached and maintained at  15-20 ug/ml for life threatening MRSA  infections such as sepsis, endocarditis, osteomyelitis and pneumonia. A  first trough level should be drawn  before the 3rd or 4th dose.  Risk of renal toxicity is increased for levels >15 ug/ml, in patients on  other nephrotoxic drugs, who are  hemodynamically unstable, have unstable renal function, or are on  Vancomycin therapy for >14 days. Renal function with  creatinine levels should be monitored for those patients. ug/mL (04.28.18 @ 09:46)      IMAGING: < from: CT Head No Cont (04.27.18 @ 10:59) >  NTERPRETATION:  History: Status post craniotomy for tumor resection.    Multiple axial sections were performed from base of skull to vertex   without contrast enhancement. Sagittal reconstructions were performed as   well    This exam is compared with prior noncontrast head CT performed on April 19, 2018.    Postop changes compatible with a right suboccipital craniotomy is again   seen. Extra-axial fluid in thepostop region is again seen and measures   approximately 9.0 mm (previously measured approximately 8.1 mm.    Asymmetry of the lateral ventricles are seen with the left lateral   ventricle more prominent than the right.    There is no evidence of acute hemorrhage, mass or mass effect seen.    Bilateral maxillary sinus ethmoid and sphenoid sinus mucosal thickening   is seen.    Both mastoid and middle ear regions appear clear    Impression:    Postop changes are again seen as described above.    < end of copied text >    MEDICATIONS  (STANDING):  docusate sodium 100 milliGRAM(s) Oral three times a day  enoxaparin Injectable 40 milliGRAM(s) SubCutaneous <User Schedule>  folic acid 1 milliGRAM(s) Oral daily  labetalol 300 milliGRAM(s) Oral two times a day  levETIRAcetam 1000 milliGRAM(s) Oral two times a day  losartan 25 milliGRAM(s) Oral daily  multivitamin 1 Tablet(s) Oral daily  nystatin Cream 1 Application(s) Topical two times a day  polyethylene glycol 3350 17 Gram(s) Oral daily  senna 2 Tablet(s) Oral at bedtime  thiamine 300 milliGRAM(s) Oral daily  triamcinolone 0.1% Cream 1 Application(s) Topical two times a day  vancomycin  IVPB 1000 milliGRAM(s) IV Intermittent every 12 hours    MEDICATIONS  (PRN):  acetaminophen   Tablet. 650 milliGRAM(s) Oral every 6 hours PRN Mild Pain (1 - 3)  HYDROmorphone  Injectable 0.5 milliGRAM(s) IV Push every 4 hours PRN breakthrough pain  ondansetron   Disintegrating Tablet 4 milliGRAM(s) Oral every 6 hours PRN Nausea  oxyCODONE    IR 10 milliGRAM(s) Oral every 3 hours PRN Moderate Pain (4 - 6)        DIET: regular diet

## 2018-04-28 NOTE — PROGRESS NOTE ADULT - ASSESSMENT
NSVT   keep electrolytes in normal range  no evidence of MI  obtain echo  agree with BB    meningitis   anbx  fu with ID    HTN  labile  cont current meds     HA  fu with head ct and neurology

## 2018-04-29 PROCEDURE — 99232 SBSQ HOSP IP/OBS MODERATE 35: CPT

## 2018-04-29 RX ORDER — DEXAMETHASONE 0.5 MG/5ML
4 ELIXIR ORAL EVERY 6 HOURS
Qty: 0 | Refills: 0 | Status: DISCONTINUED | OUTPATIENT
Start: 2018-04-29 | End: 2018-04-30

## 2018-04-29 RX ORDER — DEXAMETHASONE 0.5 MG/5ML
6 ELIXIR ORAL ONCE
Qty: 0 | Refills: 0 | Status: COMPLETED | OUTPATIENT
Start: 2018-04-29 | End: 2018-04-29

## 2018-04-29 RX ADMIN — LEVETIRACETAM 1000 MILLIGRAM(S): 250 TABLET, FILM COATED ORAL at 17:41

## 2018-04-29 RX ADMIN — OXYCODONE HYDROCHLORIDE 10 MILLIGRAM(S): 5 TABLET ORAL at 04:35

## 2018-04-29 RX ADMIN — OXYCODONE HYDROCHLORIDE 10 MILLIGRAM(S): 5 TABLET ORAL at 05:30

## 2018-04-29 RX ADMIN — Medication 100 MILLIGRAM(S): at 05:09

## 2018-04-29 RX ADMIN — Medication 1 TABLET(S): at 11:30

## 2018-04-29 RX ADMIN — OXYCODONE HYDROCHLORIDE 10 MILLIGRAM(S): 5 TABLET ORAL at 18:13

## 2018-04-29 RX ADMIN — OXYCODONE HYDROCHLORIDE 10 MILLIGRAM(S): 5 TABLET ORAL at 00:15

## 2018-04-29 RX ADMIN — Medication 250 MILLIGRAM(S): at 11:29

## 2018-04-29 RX ADMIN — Medication 101.2 MILLIGRAM(S): at 16:46

## 2018-04-29 RX ADMIN — HYDROMORPHONE HYDROCHLORIDE 0.5 MILLIGRAM(S): 2 INJECTION INTRAMUSCULAR; INTRAVENOUS; SUBCUTANEOUS at 12:51

## 2018-04-29 RX ADMIN — Medication 1 MILLIGRAM(S): at 11:30

## 2018-04-29 RX ADMIN — Medication 1 APPLICATION(S): at 17:42

## 2018-04-29 RX ADMIN — ENOXAPARIN SODIUM 40 MILLIGRAM(S): 100 INJECTION SUBCUTANEOUS at 17:42

## 2018-04-29 RX ADMIN — OXYCODONE HYDROCHLORIDE 10 MILLIGRAM(S): 5 TABLET ORAL at 21:30

## 2018-04-29 RX ADMIN — OXYCODONE HYDROCHLORIDE 10 MILLIGRAM(S): 5 TABLET ORAL at 11:35

## 2018-04-29 RX ADMIN — NYSTATIN CREAM 1 APPLICATION(S): 100000 CREAM TOPICAL at 05:08

## 2018-04-29 RX ADMIN — OXYCODONE HYDROCHLORIDE 10 MILLIGRAM(S): 5 TABLET ORAL at 08:30

## 2018-04-29 RX ADMIN — Medication 300 MILLIGRAM(S): at 11:30

## 2018-04-29 RX ADMIN — Medication 650 MILLIGRAM(S): at 12:51

## 2018-04-29 RX ADMIN — Medication 4 MILLIGRAM(S): at 17:42

## 2018-04-29 RX ADMIN — Medication 1 APPLICATION(S): at 05:08

## 2018-04-29 RX ADMIN — OXYCODONE HYDROCHLORIDE 10 MILLIGRAM(S): 5 TABLET ORAL at 17:43

## 2018-04-29 RX ADMIN — Medication 250 MILLIGRAM(S): at 21:02

## 2018-04-29 RX ADMIN — SENNA PLUS 2 TABLET(S): 8.6 TABLET ORAL at 21:02

## 2018-04-29 RX ADMIN — Medication 100 MILLIGRAM(S): at 21:02

## 2018-04-29 RX ADMIN — OXYCODONE HYDROCHLORIDE 10 MILLIGRAM(S): 5 TABLET ORAL at 07:30

## 2018-04-29 RX ADMIN — LEVETIRACETAM 1000 MILLIGRAM(S): 250 TABLET, FILM COATED ORAL at 05:07

## 2018-04-29 RX ADMIN — OXYCODONE HYDROCHLORIDE 10 MILLIGRAM(S): 5 TABLET ORAL at 21:00

## 2018-04-29 RX ADMIN — HYDROMORPHONE HYDROCHLORIDE 0.5 MILLIGRAM(S): 2 INJECTION INTRAMUSCULAR; INTRAVENOUS; SUBCUTANEOUS at 12:30

## 2018-04-29 RX ADMIN — Medication 650 MILLIGRAM(S): at 13:51

## 2018-04-29 RX ADMIN — NYSTATIN CREAM 1 APPLICATION(S): 100000 CREAM TOPICAL at 17:42

## 2018-04-29 RX ADMIN — OXYCODONE HYDROCHLORIDE 10 MILLIGRAM(S): 5 TABLET ORAL at 12:35

## 2018-04-29 RX ADMIN — LOSARTAN POTASSIUM 25 MILLIGRAM(S): 100 TABLET, FILM COATED ORAL at 05:08

## 2018-04-29 NOTE — PROGRESS NOTE ADULT - ASSESSMENT
64 yo f hx R occipital crani for resection of vestibular schwanomma, POD#13, now transferred from [facility], after found unresponsive, confused, started on empiric Vanco and Ceftriaxone.    Cultures obtained at [facility] lab [phone] with coag negative staph in CSF.  She has had her coverage narrowed to vancomycin  Fevers have resolved, still c/o headaches.  Will defer to NS on whether CT fluid is of concern  Suggest:  1.vanco level is acceptable, limit to 2 more days.  2.Headache management per NS, ? if there is a role for repeat LP  3.Continue same does of vancomycin

## 2018-04-29 NOTE — PROGRESS NOTE ADULT - SUBJECTIVE AND OBJECTIVE BOX
SUBJECTIVE: Pt seen and examined, pt reports severe headache    OVERNIGHT EVENTS: none    Vital Signs Last 24 Hrs  T(C): 36.4 (29 Apr 2018 13:07), Max: 36.7 (28 Apr 2018 16:20)  T(F): 97.5 (29 Apr 2018 13:07), Max: 98.1 (28 Apr 2018 16:20)  HR: 82 (29 Apr 2018 13:07) (79 - 131)  BP: 116/81 (29 Apr 2018 13:07) (102/68 - 139/84)  BP(mean): --  RR: 18 (29 Apr 2018 13:07) (17 - 18)  SpO2: 94% (29 Apr 2018 13:07) (94% - 97%)    PHYSICAL EXAM:    General: No Acute Distress     Neurological: Awake, alert, oriented to person, place and time, +right facial, speech slightly dysarthric, no drift, tongue midline, moving all extremities with 5/5 strength, sensation intact to light touch on all extremities    Pulmonary: Clear to Auscultation, No Rales, No Rhonchi, No Wheezes     Cardiovascular: S1, S2, Regular Rate and Rhythm     Gastrointestinal: Soft, Nontender, Nondistended     Incision: right retrosigmoid incision: + sutures, c/d/i, minimal collection is soft and fluctuant    LABS:             04-28 @ 07:01  -  04-29 @ 07:00  --------------------------------------------------------  IN: 500 mL / OUT: 0 mL / NET: 500 mL      DRAINS: none    MEDICATIONS:  Antibiotics:  vancomycin  IVPB 1000 milliGRAM(s) IV Intermittent every 12 hours    Neuro:  acetaminophen   Tablet. 650 milliGRAM(s) Oral every 6 hours PRN Mild Pain (1 - 3)  HYDROmorphone  Injectable 0.5 milliGRAM(s) IV Push every 4 hours PRN breakthrough pain  levETIRAcetam 1000 milliGRAM(s) Oral two times a day  ondansetron   Disintegrating Tablet 4 milliGRAM(s) Oral every 6 hours PRN Nausea  oxyCODONE    IR 10 milliGRAM(s) Oral every 3 hours PRN Moderate Pain (4 - 6)    Cardiac:  labetalol 300 milliGRAM(s) Oral two times a day  losartan 25 milliGRAM(s) Oral daily    Pulm:    GI/:  docusate sodium 100 milliGRAM(s) Oral three times a day  polyethylene glycol 3350 17 Gram(s) Oral daily  senna 2 Tablet(s) Oral at bedtime    Other:   enoxaparin Injectable 40 milliGRAM(s) SubCutaneous <User Schedule>  folic acid 1 milliGRAM(s) Oral daily  multivitamin 1 Tablet(s) Oral daily  nystatin Cream 1 Application(s) Topical two times a day  thiamine 300 milliGRAM(s) Oral daily  triamcinolone 0.1% Cream 1 Application(s) Topical two times a day    DIET: [x] Regular [] CCD [] Renal [] Puree [] Dysphagia [] Tube Feeds:     IMAGING:   < from: CT Head No Cont (04.27.18 @ 10:59) >  Postop changes compatible with a right suboccipital craniotomy is again   seen. Extra-axial fluid in thepostop region is again seen and measures   approximately 9.0 mm (previously measured approximately 8.1 mm.    Asymmetry of the lateral ventricles are seen with the left lateral   ventricle more prominent than the right.    There is no evidence of acute hemorrhage, mass or mass effect seen.    Bilateral maxillary sinus ethmoid and sphenoid sinus mucosal thickening   is seen.    Both mastoid and middle ear regions appear clear    Impression:    Postop changes are again seen as described above.    < end of copied text >

## 2018-04-29 NOTE — PROGRESS NOTE ADULT - ASSESSMENT
64 yo female POD 12 from right retrosig VS resection in ED transfer from [place] after she was reportedly found down and confused by mother who called 911.  Mother was not available at bedside at [facility] ED nor is she available via phone at this time. At [place], per report, CSF showed , , glucose 98, protein 240. Febrile to 100.6, tachy to 156, 98% RA, 158/88.  Reportedly seized at OSH at was given 2 ativan        PLAN:  Neuro:   - no neurosurgical intervention on this admission  - meningitis-continue vancomycin x 3 more days per ID   - continue keppra for seizure  - pain control- continue oxycodone with breakthrough IV dilaudid  - rash on back- seen by derm- continue triamcinolone cream  CV:  - HTN- continue labetalol and losartan  DVT ppx:   - venodynes while in bed, chemoprophylaxis  PT/OT:   - Home PT/OT  - pt has poor social support at home        [employer] # [id number] 64 yo female POD 12 from right retrosig VS resection in ED transfer from [place] after she was reportedly found down and confused by mother who called 911.  Mother was not available at bedside at [facility] ED nor is she available via phone at this time. At [place], per report, CSF showed , , glucose 98, protein 240. Febrile to 100.6, tachy to 156, 98% RA, 158/88.  Reportedly seized at OSH at was given 2 ativan        PLAN:  Neuro:   - no neurosurgical intervention on this admission  - meningitis-continue vancomycin x 3 more days per ID   - continue keppra for seizure  - pain control- continue oxycodone with breakthrough IV dilaudid  - started decadron for severe headache, Dr Jacobo aware  - rash on back- seen by derm- continue triamcinolone cream  CV:  - HTN- continue labetalol and losartan  DVT ppx:   - venodynes while in bed, chemoprophylaxis  PT/OT:   - Home PT/OT  - pt has poor social support at home        [employer] # [id number]

## 2018-04-29 NOTE — PROGRESS NOTE ADULT - ASSESSMENT
NSVT   keep electrolytes in normal range  no evidence of MI  obtain echo, it can be done as outpt if pt is being discharged soon  agree with BB    meningitis   anbx  fu with ID    HTN  labile  cont current meds     HA  fu with head ct and neurology

## 2018-04-29 NOTE — PROGRESS NOTE ADULT - SUBJECTIVE AND OBJECTIVE BOX
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    Patient reports persistent rt sided headaches    REVIEW OF SYSTEMS:  All other review of systems negative (Comprehensive ROS)    Antimicrobials Day #  :12/14  vancomycin  IVPB 1000 milliGRAM(s) IV Intermittent every 12 hours    Other Medications Reviewed    T(F): 97.5 (04-29-18 @ 13:07), Max: 98.1 (04-28-18 @ 16:20)  HR: 82 (04-29-18 @ 13:07)  BP: 116/81 (04-29-18 @ 13:07)  RR: 18 (04-29-18 @ 13:07)  SpO2: 94% (04-29-18 @ 13:07)  Wt(kg): --    PHYSICAL EXAM:  General: alert, no acute distress.craniotomy incision c/d/i  Eyes:  anicteric, no conjunctival injection, no discharge  Oropharynx: no lesions or injection 	  Neck: supple, without adenopathy  Lungs: clear to auscultation  Heart: regular rate and rhythm; no murmur, rubs or gallops  Abdomen: soft, nondistended, nontender, without mass or organomegaly  Skin: no lesions  Extremities: no clubbing, cyanosis, or edema  Neurologic: alert, oriented, moves all extremities    LAB RESULTS:              MICROBIOLOGY:  RECENT CULTURES:      RADIOLOGY REVIEWED:  < from: CT Head No Cont (04.27.18 @ 10:59) >  INTERPRETATION:  History: Status post craniotomy for tumor resection.    Multiple axial sections were performed from base of skull to vertex   without contrast enhancement. Sagittal reconstructions were performed as   well    This exam is compared with prior noncontrast head CT performed on April 19, 2018.    Postop changes compatible with a right suboccipital craniotomy is again   seen. Extra-axial fluid in thepostop region is again seen and measures   approximately 9.0 mm (previously measured approximately 8.1 mm.    Asymmetry of the lateral ventricles are seen with the left lateral   ventricle more prominent than the right.    There is no evidence of acute hemorrhage, mass or mass effect seen.    Bilateral maxillary sinus ethmoid and sphenoid sinus mucosal thickening   is seen.    Both mastoid and middle ear regions appear clear    Impression:    Postop changes are again seen as described above.    < end of copied text >

## 2018-04-29 NOTE — PROGRESS NOTE ADULT - SUBJECTIVE AND OBJECTIVE BOX
Subjective: Patient seen and examined. No new events except as noted.     SUBJECTIVE/ROS:  no new events       MEDICATIONS:  MEDICATIONS  (STANDING):  docusate sodium 100 milliGRAM(s) Oral three times a day  enoxaparin Injectable 40 milliGRAM(s) SubCutaneous <User Schedule>  folic acid 1 milliGRAM(s) Oral daily  labetalol 300 milliGRAM(s) Oral two times a day  levETIRAcetam 1000 milliGRAM(s) Oral two times a day  losartan 25 milliGRAM(s) Oral daily  multivitamin 1 Tablet(s) Oral daily  nystatin Cream 1 Application(s) Topical two times a day  polyethylene glycol 3350 17 Gram(s) Oral daily  senna 2 Tablet(s) Oral at bedtime  thiamine 300 milliGRAM(s) Oral daily  triamcinolone 0.1% Cream 1 Application(s) Topical two times a day  vancomycin  IVPB 1000 milliGRAM(s) IV Intermittent every 12 hours      PHYSICAL EXAM:  T(C): 36.7 (04-29-18 @ 08:25), Max: 36.7 (04-28-18 @ 16:20)  HR: 124 (04-29-18 @ 08:25) (79 - 131)  BP: 116/85 (04-29-18 @ 08:25) (102/68 - 139/84)  RR: 18 (04-29-18 @ 08:25) (17 - 18)  SpO2: 95% (04-29-18 @ 08:25) (95% - 97%)  Wt(kg): --  I&O's Summary    28 Apr 2018 07:01  -  29 Apr 2018 07:00  --------------------------------------------------------  IN: 500 mL / OUT: 0 mL / NET: 500 mL        JVP: Normal  Neck: supple  Lung: clear   CV: S1 S2 , Murmur:  Abd: soft  Ext: No edema  neuro: Awake / alert  Psych: flat affect  Skin: normal       LABS/DATA:    CARDIAC MARKERS:                  proBNP:   Lipid Profile:   HgA1c:   TSH:     TELE:  EKG:

## 2018-04-30 LAB
GLUCOSE BLDC GLUCOMTR-MCNC: 159 MG/DL — HIGH (ref 70–99)
GLUCOSE BLDC GLUCOMTR-MCNC: 173 MG/DL — HIGH (ref 70–99)
GLUCOSE BLDC GLUCOMTR-MCNC: 227 MG/DL — HIGH (ref 70–99)
VANCOMYCIN TROUGH SERPL-MCNC: 21.6 UG/ML — HIGH (ref 10–20)

## 2018-04-30 PROCEDURE — 99233 SBSQ HOSP IP/OBS HIGH 50: CPT

## 2018-04-30 PROCEDURE — 99232 SBSQ HOSP IP/OBS MODERATE 35: CPT

## 2018-04-30 RX ORDER — DEXTROSE 50 % IN WATER 50 %
12.5 SYRINGE (ML) INTRAVENOUS ONCE
Qty: 0 | Refills: 0 | Status: DISCONTINUED | OUTPATIENT
Start: 2018-04-30 | End: 2018-05-02

## 2018-04-30 RX ORDER — OXYCODONE HYDROCHLORIDE 5 MG/1
5 TABLET ORAL EVERY 4 HOURS
Qty: 0 | Refills: 0 | Status: DISCONTINUED | OUTPATIENT
Start: 2018-04-30 | End: 2018-05-02

## 2018-04-30 RX ORDER — INSULIN LISPRO 100/ML
VIAL (ML) SUBCUTANEOUS
Qty: 0 | Refills: 0 | Status: DISCONTINUED | OUTPATIENT
Start: 2018-04-30 | End: 2018-05-02

## 2018-04-30 RX ORDER — DEXTROSE 50 % IN WATER 50 %
1 SYRINGE (ML) INTRAVENOUS ONCE
Qty: 0 | Refills: 0 | Status: DISCONTINUED | OUTPATIENT
Start: 2018-04-30 | End: 2018-05-02

## 2018-04-30 RX ORDER — SODIUM CHLORIDE 9 MG/ML
1000 INJECTION, SOLUTION INTRAVENOUS
Qty: 0 | Refills: 0 | Status: DISCONTINUED | OUTPATIENT
Start: 2018-04-30 | End: 2018-05-02

## 2018-04-30 RX ORDER — DEXTROSE 50 % IN WATER 50 %
25 SYRINGE (ML) INTRAVENOUS ONCE
Qty: 0 | Refills: 0 | Status: DISCONTINUED | OUTPATIENT
Start: 2018-04-30 | End: 2018-05-02

## 2018-04-30 RX ORDER — INSULIN LISPRO 100/ML
VIAL (ML) SUBCUTANEOUS AT BEDTIME
Qty: 0 | Refills: 0 | Status: DISCONTINUED | OUTPATIENT
Start: 2018-04-30 | End: 2018-05-02

## 2018-04-30 RX ORDER — DEXAMETHASONE 0.5 MG/5ML
3 ELIXIR ORAL EVERY 8 HOURS
Qty: 0 | Refills: 0 | Status: DISCONTINUED | OUTPATIENT
Start: 2018-04-30 | End: 2018-05-01

## 2018-04-30 RX ORDER — GLUCAGON INJECTION, SOLUTION 0.5 MG/.1ML
1 INJECTION, SOLUTION SUBCUTANEOUS ONCE
Qty: 0 | Refills: 0 | Status: DISCONTINUED | OUTPATIENT
Start: 2018-04-30 | End: 2018-05-02

## 2018-04-30 RX ADMIN — OXYCODONE HYDROCHLORIDE 10 MILLIGRAM(S): 5 TABLET ORAL at 12:53

## 2018-04-30 RX ADMIN — Medication 1: at 17:46

## 2018-04-30 RX ADMIN — Medication 1 TABLET(S): at 12:47

## 2018-04-30 RX ADMIN — OXYCODONE HYDROCHLORIDE 10 MILLIGRAM(S): 5 TABLET ORAL at 13:23

## 2018-04-30 RX ADMIN — NYSTATIN CREAM 1 APPLICATION(S): 100000 CREAM TOPICAL at 17:49

## 2018-04-30 RX ADMIN — Medication 250 MILLIGRAM(S): at 21:32

## 2018-04-30 RX ADMIN — Medication 250 MILLIGRAM(S): at 10:05

## 2018-04-30 RX ADMIN — Medication 2: at 12:47

## 2018-04-30 RX ADMIN — OXYCODONE HYDROCHLORIDE 10 MILLIGRAM(S): 5 TABLET ORAL at 05:57

## 2018-04-30 RX ADMIN — Medication 1 MILLIGRAM(S): at 12:47

## 2018-04-30 RX ADMIN — Medication 300 MILLIGRAM(S): at 12:47

## 2018-04-30 RX ADMIN — Medication 100 MILLIGRAM(S): at 13:52

## 2018-04-30 RX ADMIN — Medication 4 MILLIGRAM(S): at 00:30

## 2018-04-30 RX ADMIN — ENOXAPARIN SODIUM 40 MILLIGRAM(S): 100 INJECTION SUBCUTANEOUS at 17:49

## 2018-04-30 RX ADMIN — Medication 100 MILLIGRAM(S): at 05:57

## 2018-04-30 RX ADMIN — LEVETIRACETAM 1000 MILLIGRAM(S): 250 TABLET, FILM COATED ORAL at 17:49

## 2018-04-30 RX ADMIN — OXYCODONE HYDROCHLORIDE 10 MILLIGRAM(S): 5 TABLET ORAL at 01:30

## 2018-04-30 RX ADMIN — OXYCODONE HYDROCHLORIDE 10 MILLIGRAM(S): 5 TABLET ORAL at 06:37

## 2018-04-30 RX ADMIN — POLYETHYLENE GLYCOL 3350 17 GRAM(S): 17 POWDER, FOR SOLUTION ORAL at 12:48

## 2018-04-30 RX ADMIN — NYSTATIN CREAM 1 APPLICATION(S): 100000 CREAM TOPICAL at 05:58

## 2018-04-30 RX ADMIN — LEVETIRACETAM 1000 MILLIGRAM(S): 250 TABLET, FILM COATED ORAL at 05:57

## 2018-04-30 RX ADMIN — Medication 3 MILLIGRAM(S): at 21:32

## 2018-04-30 RX ADMIN — Medication 300 MILLIGRAM(S): at 17:50

## 2018-04-30 RX ADMIN — Medication 3 MILLIGRAM(S): at 13:52

## 2018-04-30 RX ADMIN — Medication 1 APPLICATION(S): at 17:49

## 2018-04-30 RX ADMIN — Medication 300 MILLIGRAM(S): at 05:57

## 2018-04-30 RX ADMIN — OXYCODONE HYDROCHLORIDE 10 MILLIGRAM(S): 5 TABLET ORAL at 00:33

## 2018-04-30 RX ADMIN — LOSARTAN POTASSIUM 25 MILLIGRAM(S): 100 TABLET, FILM COATED ORAL at 05:57

## 2018-04-30 RX ADMIN — Medication 4 MILLIGRAM(S): at 05:57

## 2018-04-30 RX ADMIN — Medication 1 APPLICATION(S): at 05:57

## 2018-04-30 NOTE — PROGRESS NOTE ADULT - SUBJECTIVE AND OBJECTIVE BOX
Subjective: Patient seen and examined. No new events except as noted.     SUBJECTIVE/ROS:  feels ok       MEDICATIONS:  MEDICATIONS  (STANDING):  dexamethasone  Injectable 3 milliGRAM(s) IV Push every 8 hours  dextrose 5%. 1000 milliLiter(s) (50 mL/Hr) IV Continuous <Continuous>  dextrose 50% Injectable 12.5 Gram(s) IV Push once  dextrose 50% Injectable 25 Gram(s) IV Push once  dextrose 50% Injectable 25 Gram(s) IV Push once  docusate sodium 100 milliGRAM(s) Oral three times a day  enoxaparin Injectable 40 milliGRAM(s) SubCutaneous <User Schedule>  folic acid 1 milliGRAM(s) Oral daily  insulin lispro (HumaLOG) corrective regimen sliding scale   SubCutaneous three times a day before meals  insulin lispro (HumaLOG) corrective regimen sliding scale   SubCutaneous at bedtime  labetalol 300 milliGRAM(s) Oral two times a day  levETIRAcetam 1000 milliGRAM(s) Oral two times a day  losartan 25 milliGRAM(s) Oral daily  multivitamin 1 Tablet(s) Oral daily  nystatin Cream 1 Application(s) Topical two times a day  polyethylene glycol 3350 17 Gram(s) Oral daily  senna 2 Tablet(s) Oral at bedtime  thiamine 300 milliGRAM(s) Oral daily  triamcinolone 0.1% Cream 1 Application(s) Topical two times a day  vancomycin  IVPB 1000 milliGRAM(s) IV Intermittent every 12 hours      PHYSICAL EXAM:  T(C): 36.7 (04-30-18 @ 08:13), Max: 36.7 (04-29-18 @ 16:16)  HR: 102 (04-30-18 @ 08:13) (79 - 140)  BP: 128/90 (04-30-18 @ 08:13) (108/72 - 138/98)  RR: 18 (04-30-18 @ 08:13) (18 - 18)  SpO2: 93% (04-30-18 @ 08:13) (93% - 97%)  Wt(kg): --  I&O's Summary      JVP: Normal  Neck: supple  Lung: clear   CV: S1 S2 , Murmur:  Abd: soft  Ext: No edema  neuro: Awake / alert  Psych: flat affect  Skin: normal       LABS/DATA:    CARDIAC MARKERS:                  proBNP:   Lipid Profile:   HgA1c:   TSH:     TELE:  EKG:

## 2018-04-30 NOTE — PROGRESS NOTE ADULT - PROBLEM SELECTOR PLAN 6
f/u TTE per cards

## 2018-04-30 NOTE — PROGRESS NOTE ADULT - ASSESSMENT
63F with Right vestibular schwannoma s/p R occipital crani for resection was reportedly found down and confused by mother. Transferred from [facility] for treatment of possible meningitis (coag neg staph) and seizures.

## 2018-04-30 NOTE — PROGRESS NOTE ADULT - PROBLEM SELECTOR PLAN 7
recs per derm - steroid cream, likely secondary changes, diaper dermatitis
recs per derm
recs per derm - steroid cream, likely secondary changes, diaper dermatitis
recs per derm - steroid cream, likely secondary changes, diaper dermatitis
isn't bothering pt too much, not too concerning for drug eruption or anything of that sort, not typical area of herpes - would call derm to rule out scabies or other issues of that sort given social/support concerns at home

## 2018-04-30 NOTE — PROGRESS NOTE ADULT - PROBLEM SELECTOR PLAN 4
BP improved on labetalol  c/w ARB - can uptitrate if needed after labetalol restarted
BP improved  c/w ARB - can uptitrate if needed - per cards ok to continue current regimen
BP improved  c/w ARB - can uptitrate if needed - per cards ok to continue current regimen
BP improved on labetalol  c/w ARB - can uptitrate if needed after labetalol restarted
BP in 150s - not optimal - recommend replacing metoprolol w/home labetalol dose  300mg BID for better BP control  c/w ARB - can uptitrate if needed after labetalol restarted
BP improved on labetalol - still occasionally elevated  c/w ARB - can uptitrate if needed after labetalol restarted - per cards ok to continue current regimen

## 2018-04-30 NOTE — PROGRESS NOTE ADULT - PROBLEM SELECTOR PLAN 3
pain control per NSGY
post op from resection of tumor  improved significantly since starting steroids
post op from resection of tumor  oxycodone prn with dilaudid iV prn breathrough pain
post op from resection of tumor

## 2018-04-30 NOTE — PROGRESS NOTE ADULT - PROBLEM SELECTOR PROBLEM 3
New daily persistent headache

## 2018-04-30 NOTE — PROGRESS NOTE ADULT - PROBLEM SELECTOR PLAN 5
resume fluoxetine if pt agrees

## 2018-04-30 NOTE — PROGRESS NOTE ADULT - SUBJECTIVE AND OBJECTIVE BOX
Patient is a 63y old  Female who presents with a chief complaint of AMS (17 Apr 2018 16:35)        SUBJECTIVE / OVERNIGHT EVENTS: headache improved after steroids      MEDICATIONS  (STANDING):  dexamethasone  Injectable 3 milliGRAM(s) IV Push every 8 hours  dextrose 5%. 1000 milliLiter(s) (50 mL/Hr) IV Continuous <Continuous>  dextrose 50% Injectable 12.5 Gram(s) IV Push once  dextrose 50% Injectable 25 Gram(s) IV Push once  dextrose 50% Injectable 25 Gram(s) IV Push once  docusate sodium 100 milliGRAM(s) Oral three times a day  enoxaparin Injectable 40 milliGRAM(s) SubCutaneous <User Schedule>  folic acid 1 milliGRAM(s) Oral daily  insulin lispro (HumaLOG) corrective regimen sliding scale   SubCutaneous three times a day before meals  insulin lispro (HumaLOG) corrective regimen sliding scale   SubCutaneous at bedtime  labetalol 300 milliGRAM(s) Oral two times a day  levETIRAcetam 1000 milliGRAM(s) Oral two times a day  losartan 25 milliGRAM(s) Oral daily  multivitamin 1 Tablet(s) Oral daily  nystatin Cream 1 Application(s) Topical two times a day  polyethylene glycol 3350 17 Gram(s) Oral daily  senna 2 Tablet(s) Oral at bedtime  thiamine 300 milliGRAM(s) Oral daily  triamcinolone 0.1% Cream 1 Application(s) Topical two times a day  vancomycin  IVPB 1000 milliGRAM(s) IV Intermittent every 12 hours    MEDICATIONS  (PRN):  acetaminophen   Tablet. 650 milliGRAM(s) Oral every 6 hours PRN Mild Pain (1 - 3)  dextrose Gel 1 Dose(s) Oral once PRN Blood Glucose LESS THAN 70 milliGRAM(s)/deciliter  glucagon  Injectable 1 milliGRAM(s) IntraMuscular once PRN Glucose LESS THAN 70 milligrams/deciliter  ondansetron   Disintegrating Tablet 4 milliGRAM(s) Oral every 6 hours PRN Nausea  oxyCODONE    IR 10 milliGRAM(s) Oral every 3 hours PRN Moderate Pain (4 - 6)  oxyCODONE    IR 5 milliGRAM(s) Oral every 4 hours PRN Mild Pain (1 - 3)      Vital Signs Last 24 Hrs  T(C): 36.6 (30 Apr 2018 12:11), Max: 36.7 (29 Apr 2018 16:16)  T(F): 97.9 (30 Apr 2018 12:11), Max: 98.1 (30 Apr 2018 08:13)  HR: 91 (30 Apr 2018 12:11) (79 - 140)  BP: 118/79 (30 Apr 2018 12:11) (108/72 - 138/98)  BP(mean): --  RR: 18 (30 Apr 2018 12:11) (18 - 18)  SpO2: 95% (30 Apr 2018 12:11) (93% - 97%)  CAPILLARY BLOOD GLUCOSE      POCT Blood Glucose.: 227 mg/dL (30 Apr 2018 12:29)    I&O's Summary      PHYSICAL EXAM  HEENT: s/p right occipital crani, MMM, neck supple, no JVD  EYES: EOMI, PERRLA, conjunctiva and sclera clear  CHEST/LUNG: Clear to auscultation bilaterally; No wheeze, rhonchi or rales.  HEART: S1, S2, rrr; No murmurs, rubs, or gallops  ABDOMEN: Soft, Nontender, Nondistended; Bowel sounds present  EXTREMITIES:  2+ Peripheral Pulses, No clubbing, cyanosis, or edema  PSYCH: calm, limited affect  NEUROLOGY: AOx3  SKIN: unchanged    LABS:                    RADIOLOGY & ADDITIONAL TESTS:    Imaging Personally Reviewed:  Consultant(s) Notes Reviewed: Cards, id   Care Discussed with Consultants/Other Providers: d/w Neurosurgery TONYA Parson regarding plan of care

## 2018-04-30 NOTE — PROGRESS NOTE ADULT - PROBLEM SELECTOR PROBLEM 1
Meningitis after procedure

## 2018-04-30 NOTE — PROGRESS NOTE ADULT - PROBLEM SELECTOR PROBLEM 6
NSVT (nonsustained ventricular tachycardia)

## 2018-04-30 NOTE — PROGRESS NOTE ADULT - PROBLEM SELECTOR PLAN 1
CoNS - anticipate ~14d total vanco course as per ID  monitor vanc trough - vanc dose now 1g Q 12 - level acceptable - Day 13 of 14

## 2018-04-30 NOTE — PROGRESS NOTE ADULT - ASSESSMENT
62 yo female POD 12 from right retrosig VS resection in ED transfer from [place] after she was reportedly found down and confused by mother who called 911.  Mother was not available at bedside at [facility] ED nor is she available via phone at this time. At [place], per report, CSF showed , , glucose 98, protein 240. Febrile to 100.6, tachy to 156, 98% RA, 158/88.  Reportedly seized at OSH at was given 2 ativan    PLAN:  Neuro:   - no neurosurgical intervention on this admission  - meningitis-continue vancomycin x 2 more days until 5/2  per ID   - continue keppra for seizure  - pain control- continue oxycodone   - continue decadron for severe headache, decreased to 3mg q 6,  Dr Jacobo aware, headache much improved  - continue fingersticks while on decadron  - rash on back- seen by derm- continue triamcinolone cream  CV:  - HTN- continue labetalol and losartan  DVT ppx:   - venodynes while in bed, chemoprophylaxis  PT/OT:   - Home PT/OT  - pt has poor social support at home         [employer] # [id number] 62 yo female POD 12 from right retrosig VS resection in ED transfer from [place] after she was reportedly found down and confused by mother who called 911.  Mother was not available at bedside at [facility] ED nor is she available via phone at this time. At [place], per report, CSF showed , , glucose 98, protein 240. Febrile to 100.6, tachy to 156, 98% RA, 158/88.  Reportedly seized at OSH at was given 2 ativan    PLAN:  Neuro:   - no neurosurgical intervention on this admission  - meningitis-continue vancomycin x 2 more days until 5/2  per ID   - continue keppra for seizure  - pain control- continue oxycodone   - continue decadron for severe headache, decreased to 3mg q 6,  Dr Jacobo aware, headache much improved  - continue fingersticks while on decadron  - rash on back- seen by derm- continue triamcinolone cream  CV:  - HTN- continue labetalol and losartan  DVT ppx:   - venodynes while in bed, chemoprophylaxis  PT/OT:   - Home PT/OT  - pt has poor social support at home     More than 30 minutes spent on total encounter: more than 50% of the visit was spent on educating the patient and family regarding condition, medications, follow up plans, signs and symptoms to be concerned with, preparing paperwork, and questions answered regarding discharge.          [employer] # [id number]

## 2018-04-30 NOTE — PROGRESS NOTE ADULT - SUBJECTIVE AND OBJECTIVE BOX
CC: f/u for cns meningitis    Patient reports  feels much better, no headache  REVIEW OF SYSTEMS:  All other review of systems negative (Comprehensive ROS)    Antimicrobials Day #  :13/14  vancomycin  IVPB 1000 milliGRAM(s) IV Intermittent every 12 hours    Other Medications Reviewed    T(F): 97.9 (04-30-18 @ 12:11), Max: 98.1 (04-30-18 @ 08:13)  HR: 91 (04-30-18 @ 12:11)  BP: 118/79 (04-30-18 @ 12:11)  RR: 18 (04-30-18 @ 12:11)  SpO2: 95% (04-30-18 @ 12:11)  Wt(kg): --    PHYSICAL EXAM:  General: alert, no acute distress. head wound clean some fluid under  Eyes:  anicteric, no conjunctival injection, no discharge  Oropharynx: no lesions or injection 	  Neck: supple, without adenopathy  Lungs: clear to auscultation  Heart: regular rate and rhythm; no murmur, rubs or gallops  Abdomen: soft, nondistended, nontender, without mass or organomegaly  Skin: no lesions  Extremities: no clubbing, cyanosis, or edema  Neurologic: alert, oriented, moves all extremities    LAB RESULTS:  Complete Blood Count + Automated Diff in AM (04.27.18 @ 07:38)    WBC Count: 4.85 K/uL    RBC Count: 3.30 M/uL    Hemoglobin: 11.5 g/dL    Hematocrit: 34.3 %    Mean Cell Volume: 103.9 fl    Mean Cell Hemoglobin: 34.8 pg    Mean Cell Hemoglobin Conc: 33.5 gm/dL    Red Cell Distrib Width: 12.7 %    Platelet Count - Automated: 224 K/uL    Auto Neutrophil #: 2.67 K/uL    Auto Lymphocyte #: 1.06 K/uL    Auto Monocyte #: 0.77 K/uL    Auto Eosinophil #: 0.29 K/uL    Auto Basophil #: 0.03 K/uL    Auto Neutrophil %: 55.0: Differential percentages must be correlated with absolute numbers for  clinical significance. %    Auto Lymphocyte %: 21.9 %    Auto Monocyte %: 15.9 %    Auto Eosinophil %: 6.0 %    Auto Basophil %: 0.6 %    Auto Immature Granulocyte %: 0.6 %                MICROBIOLOGY:  RECENT CULTURES:    Culture - Blood (04.17.18 @ 19:16)    Specimen Source: .Blood Blood    Culture Results:   No growth at 5 days.      RADIOLOGY REVIEWED:      < from: CT Head No Cont (04.27.18 @ 10:59) >    Impression:    Postop changes are again seen as described above.            < end of copied text >          Assessment:  Patient s/p posterior craniotomy for tumor returns with post neurosurgical coag neg staph meningitis now much improved.   Plan: one more day of iv vancomycin  defer to neurosurgery if needs any surgical intervention for possible pseudomeningocoele

## 2018-04-30 NOTE — PROGRESS NOTE ADULT - ASSESSMENT
NSVT   keep electrolytes in normal range  no evidence of MI  obtain echo,  agree with BB    meningitis   anbx  fu with ID    HTN  labile  cont current meds     HA  fu with head ct and neurology

## 2018-04-30 NOTE — PROGRESS NOTE ADULT - SUBJECTIVE AND OBJECTIVE BOX
SUBJECTIVE: Pt seen and examined, headache significantly improved, states she wants to go home    OVERNIGHT EVENTS: none    Vital Signs Last 24 Hrs  T(C): 36.6 (30 Apr 2018 12:11), Max: 36.7 (29 Apr 2018 16:16)  T(F): 97.9 (30 Apr 2018 12:11), Max: 98.1 (30 Apr 2018 08:13)  HR: 91 (30 Apr 2018 12:11) (79 - 140)  BP: 118/79 (30 Apr 2018 12:11) (108/72 - 138/98)  BP(mean): --  RR: 18 (30 Apr 2018 12:11) (18 - 18)  SpO2: 95% (30 Apr 2018 12:11) (93% - 97%)    PHYSICAL EXAM:    General: No Acute Distress     Neurological: Awake, alert, oriented to person, place and time, +right facial, speech slightly dysarthric, no drift, tongue midline, moving all extremities with 5/5 strength, sensation intact to light touch on all extremities    Pulmonary: Clear to Auscultation, No Rales, No Rhonchi, No Wheezes     Cardiovascular: S1, S2, Regular Rate and Rhythm     Gastrointestinal: Soft, Nontender, Nondistended     Incision: right     LABS:             DRAINS:     MEDICATIONS:  Antibiotics:  vancomycin  IVPB 1000 milliGRAM(s) IV Intermittent every 12 hours    Neuro:  acetaminophen   Tablet. 650 milliGRAM(s) Oral every 6 hours PRN Mild Pain (1 - 3)  HYDROmorphone  Injectable 0.5 milliGRAM(s) IV Push every 4 hours PRN breakthrough pain  levETIRAcetam 1000 milliGRAM(s) Oral two times a day  ondansetron   Disintegrating Tablet 4 milliGRAM(s) Oral every 6 hours PRN Nausea  oxyCODONE    IR 10 milliGRAM(s) Oral every 3 hours PRN Moderate Pain (4 - 6)    Cardiac:  labetalol 300 milliGRAM(s) Oral two times a day  losartan 25 milliGRAM(s) Oral daily    Pulm:    GI/:  docusate sodium 100 milliGRAM(s) Oral three times a day  polyethylene glycol 3350 17 Gram(s) Oral daily  senna 2 Tablet(s) Oral at bedtime    Other:   dexamethasone  Injectable 3 milliGRAM(s) IV Push every 8 hours  dextrose 5%. 1000 milliLiter(s) IV Continuous <Continuous>  dextrose 50% Injectable 12.5 Gram(s) IV Push once  dextrose 50% Injectable 25 Gram(s) IV Push once  dextrose 50% Injectable 25 Gram(s) IV Push once  dextrose Gel 1 Dose(s) Oral once PRN Blood Glucose LESS THAN 70 milliGRAM(s)/deciliter  enoxaparin Injectable 40 milliGRAM(s) SubCutaneous <User Schedule>  folic acid 1 milliGRAM(s) Oral daily  glucagon  Injectable 1 milliGRAM(s) IntraMuscular once PRN Glucose LESS THAN 70 milligrams/deciliter  insulin lispro (HumaLOG) corrective regimen sliding scale   SubCutaneous three times a day before meals  insulin lispro (HumaLOG) corrective regimen sliding scale   SubCutaneous at bedtime  multivitamin 1 Tablet(s) Oral daily  nystatin Cream 1 Application(s) Topical two times a day  thiamine 300 milliGRAM(s) Oral daily  triamcinolone 0.1% Cream 1 Application(s) Topical two times a day    DIET: [] Regular [] CCD [] Renal [] Puree [] Dysphagia [] Tube Feeds:     IMAGING: SUBJECTIVE: Pt seen and examined, headache significantly improved, states she wants to go home    OVERNIGHT EVENTS: none    Vital Signs Last 24 Hrs  T(C): 36.6 (30 Apr 2018 12:11), Max: 36.7 (29 Apr 2018 16:16)  T(F): 97.9 (30 Apr 2018 12:11), Max: 98.1 (30 Apr 2018 08:13)  HR: 91 (30 Apr 2018 12:11) (79 - 140)  BP: 118/79 (30 Apr 2018 12:11) (108/72 - 138/98)  BP(mean): --  RR: 18 (30 Apr 2018 12:11) (18 - 18)  SpO2: 95% (30 Apr 2018 12:11) (93% - 97%)    PHYSICAL EXAM:    General: No Acute Distress     Neurological: Awake, alert, oriented to person, place and time, +right facial, speech slightly dysarthric, no drift, tongue midline, moving all extremities with 5/5 strength, sensation intact to light touch on all extremities    Pulmonary: Clear to Auscultation, No Rales, No Rhonchi, No Wheezes     Cardiovascular: S1, S2, Regular Rate and Rhythm     Gastrointestinal: Soft, Nontender, Nondistended     Incision: right retrosigmoid sutures c/d/i    LABS:             DRAINS: none    MEDICATIONS:  Antibiotics:  vancomycin  IVPB 1000 milliGRAM(s) IV Intermittent every 12 hours    Neuro:  acetaminophen   Tablet. 650 milliGRAM(s) Oral every 6 hours PRN Mild Pain (1 - 3)  HYDROmorphone  Injectable 0.5 milliGRAM(s) IV Push every 4 hours PRN breakthrough pain  levETIRAcetam 1000 milliGRAM(s) Oral two times a day  ondansetron   Disintegrating Tablet 4 milliGRAM(s) Oral every 6 hours PRN Nausea  oxyCODONE    IR 10 milliGRAM(s) Oral every 3 hours PRN Moderate Pain (4 - 6)    Cardiac:  labetalol 300 milliGRAM(s) Oral two times a day  losartan 25 milliGRAM(s) Oral daily    Pulm:    GI/:  docusate sodium 100 milliGRAM(s) Oral three times a day  polyethylene glycol 3350 17 Gram(s) Oral daily  senna 2 Tablet(s) Oral at bedtime    Other:   dexamethasone  Injectable 3 milliGRAM(s) IV Push every 8 hours  dextrose 5%. 1000 milliLiter(s) IV Continuous <Continuous>  dextrose 50% Injectable 12.5 Gram(s) IV Push once  dextrose 50% Injectable 25 Gram(s) IV Push once  dextrose 50% Injectable 25 Gram(s) IV Push once  dextrose Gel 1 Dose(s) Oral once PRN Blood Glucose LESS THAN 70 milliGRAM(s)/deciliter  enoxaparin Injectable 40 milliGRAM(s) SubCutaneous <User Schedule>  folic acid 1 milliGRAM(s) Oral daily  glucagon  Injectable 1 milliGRAM(s) IntraMuscular once PRN Glucose LESS THAN 70 milligrams/deciliter  insulin lispro (HumaLOG) corrective regimen sliding scale   SubCutaneous three times a day before meals  insulin lispro (HumaLOG) corrective regimen sliding scale   SubCutaneous at bedtime  multivitamin 1 Tablet(s) Oral daily  nystatin Cream 1 Application(s) Topical two times a day  thiamine 300 milliGRAM(s) Oral daily  triamcinolone 0.1% Cream 1 Application(s) Topical two times a day    DIET: [x] Regular [] CCD [] Renal [] Puree [] Dysphagia [] Tube Feeds:     IMAGING:   < from: CT Head No Cont (04.27.18 @ 10:59) >  This exam is compared with prior noncontrast head CT performed on April 19, 2018.    Postop changes compatible with a right suboccipital craniotomy is again   seen. Extra-axial fluid in thepostop region is again seen and measures   approximately 9.0 mm (previously measured approximately 8.1 mm.    Asymmetry of the lateral ventricles are seen with the left lateral   ventricle more prominent than the right.    There is no evidence of acute hemorrhage, mass or mass effect seen.    Bilateral maxillary sinus ethmoid and sphenoid sinus mucosal thickening   is seen.    Both mastoid and middle ear regions appear clear    Impression:    Postop changes are again seen as described above.      < end of copied text >

## 2018-05-01 ENCOUNTER — TRANSCRIPTION ENCOUNTER (OUTPATIENT)
Age: 63
End: 2018-05-01

## 2018-05-01 LAB
ANION GAP SERPL CALC-SCNC: 15 MMOL/L — SIGNIFICANT CHANGE UP (ref 5–17)
BUN SERPL-MCNC: 15 MG/DL — SIGNIFICANT CHANGE UP (ref 7–23)
CALCIUM SERPL-MCNC: 10 MG/DL — SIGNIFICANT CHANGE UP (ref 8.4–10.5)
CHLORIDE SERPL-SCNC: 100 MMOL/L — SIGNIFICANT CHANGE UP (ref 96–108)
CO2 SERPL-SCNC: 24 MMOL/L — SIGNIFICANT CHANGE UP (ref 22–31)
CREAT SERPL-MCNC: 0.85 MG/DL — SIGNIFICANT CHANGE UP (ref 0.5–1.3)
GLUCOSE BLDC GLUCOMTR-MCNC: 118 MG/DL — HIGH (ref 70–99)
GLUCOSE BLDC GLUCOMTR-MCNC: 127 MG/DL — HIGH (ref 70–99)
GLUCOSE BLDC GLUCOMTR-MCNC: 135 MG/DL — HIGH (ref 70–99)
GLUCOSE BLDC GLUCOMTR-MCNC: 239 MG/DL — HIGH (ref 70–99)
GLUCOSE SERPL-MCNC: 150 MG/DL — HIGH (ref 70–99)
HBA1C BLD-MCNC: 5.2 % — SIGNIFICANT CHANGE UP (ref 4–5.6)
HCT VFR BLD CALC: 33.2 % — LOW (ref 34.5–45)
HGB BLD-MCNC: 11.3 G/DL — LOW (ref 11.5–15.5)
MCHC RBC-ENTMCNC: 33.6 PG — SIGNIFICANT CHANGE UP (ref 27–34)
MCHC RBC-ENTMCNC: 34 GM/DL — SIGNIFICANT CHANGE UP (ref 32–36)
MCV RBC AUTO: 98.8 FL — SIGNIFICANT CHANGE UP (ref 80–100)
PLATELET # BLD AUTO: 397 K/UL — SIGNIFICANT CHANGE UP (ref 150–400)
POTASSIUM SERPL-MCNC: 4.1 MMOL/L — SIGNIFICANT CHANGE UP (ref 3.5–5.3)
POTASSIUM SERPL-SCNC: 4.1 MMOL/L — SIGNIFICANT CHANGE UP (ref 3.5–5.3)
RBC # BLD: 3.36 M/UL — LOW (ref 3.8–5.2)
RBC # FLD: 12.9 % — SIGNIFICANT CHANGE UP (ref 10.3–14.5)
SODIUM SERPL-SCNC: 139 MMOL/L — SIGNIFICANT CHANGE UP (ref 135–145)
WBC # BLD: 9.32 K/UL — SIGNIFICANT CHANGE UP (ref 3.8–10.5)
WBC # FLD AUTO: 9.32 K/UL — SIGNIFICANT CHANGE UP (ref 3.8–10.5)

## 2018-05-01 PROCEDURE — 99233 SBSQ HOSP IP/OBS HIGH 50: CPT

## 2018-05-01 PROCEDURE — 93306 TTE W/DOPPLER COMPLETE: CPT | Mod: 26

## 2018-05-01 RX ORDER — DEXAMETHASONE 0.5 MG/5ML
2 ELIXIR ORAL EVERY 12 HOURS
Qty: 0 | Refills: 0 | Status: COMPLETED | OUTPATIENT
Start: 2018-05-01 | End: 2018-05-02

## 2018-05-01 RX ADMIN — OXYCODONE HYDROCHLORIDE 10 MILLIGRAM(S): 5 TABLET ORAL at 20:30

## 2018-05-01 RX ADMIN — Medication 1 MILLIGRAM(S): at 11:43

## 2018-05-01 RX ADMIN — LOSARTAN POTASSIUM 25 MILLIGRAM(S): 100 TABLET, FILM COATED ORAL at 06:09

## 2018-05-01 RX ADMIN — Medication 1 APPLICATION(S): at 06:08

## 2018-05-01 RX ADMIN — OXYCODONE HYDROCHLORIDE 10 MILLIGRAM(S): 5 TABLET ORAL at 04:22

## 2018-05-01 RX ADMIN — ENOXAPARIN SODIUM 40 MILLIGRAM(S): 100 INJECTION SUBCUTANEOUS at 18:20

## 2018-05-01 RX ADMIN — OXYCODONE HYDROCHLORIDE 10 MILLIGRAM(S): 5 TABLET ORAL at 12:13

## 2018-05-01 RX ADMIN — Medication 1 APPLICATION(S): at 18:21

## 2018-05-01 RX ADMIN — Medication 2: at 13:01

## 2018-05-01 RX ADMIN — Medication 1 TABLET(S): at 11:43

## 2018-05-01 RX ADMIN — OXYCODONE HYDROCHLORIDE 10 MILLIGRAM(S): 5 TABLET ORAL at 03:32

## 2018-05-01 RX ADMIN — OXYCODONE HYDROCHLORIDE 10 MILLIGRAM(S): 5 TABLET ORAL at 11:43

## 2018-05-01 RX ADMIN — LEVETIRACETAM 1000 MILLIGRAM(S): 250 TABLET, FILM COATED ORAL at 18:20

## 2018-05-01 RX ADMIN — Medication 2 MILLIGRAM(S): at 18:00

## 2018-05-01 RX ADMIN — Medication 300 MILLIGRAM(S): at 06:09

## 2018-05-01 RX ADMIN — Medication 300 MILLIGRAM(S): at 18:20

## 2018-05-01 RX ADMIN — Medication 300 MILLIGRAM(S): at 11:43

## 2018-05-01 RX ADMIN — Medication 250 MILLIGRAM(S): at 09:30

## 2018-05-01 RX ADMIN — OXYCODONE HYDROCHLORIDE 10 MILLIGRAM(S): 5 TABLET ORAL at 19:41

## 2018-05-01 RX ADMIN — LEVETIRACETAM 1000 MILLIGRAM(S): 250 TABLET, FILM COATED ORAL at 06:09

## 2018-05-01 RX ADMIN — Medication 3 MILLIGRAM(S): at 06:09

## 2018-05-01 RX ADMIN — NYSTATIN CREAM 1 APPLICATION(S): 100000 CREAM TOPICAL at 18:21

## 2018-05-01 RX ADMIN — NYSTATIN CREAM 1 APPLICATION(S): 100000 CREAM TOPICAL at 06:08

## 2018-05-01 NOTE — PROGRESS NOTE ADULT - SUBJECTIVE AND OBJECTIVE BOX
Subjective: Patient seen and examined. No new events except as noted.     SUBJECTIVE/ROS:  No chest pain, dyspnea, palpitation, or dizziness.         MEDICATIONS:  MEDICATIONS  (STANDING):  dexamethasone     Tablet 2 milliGRAM(s) Oral every 12 hours  dextrose 5%. 1000 milliLiter(s) (50 mL/Hr) IV Continuous <Continuous>  dextrose 50% Injectable 12.5 Gram(s) IV Push once  dextrose 50% Injectable 25 Gram(s) IV Push once  dextrose 50% Injectable 25 Gram(s) IV Push once  docusate sodium 100 milliGRAM(s) Oral three times a day  enoxaparin Injectable 40 milliGRAM(s) SubCutaneous <User Schedule>  folic acid 1 milliGRAM(s) Oral daily  insulin lispro (HumaLOG) corrective regimen sliding scale   SubCutaneous three times a day before meals  insulin lispro (HumaLOG) corrective regimen sliding scale   SubCutaneous at bedtime  labetalol 300 milliGRAM(s) Oral two times a day  levETIRAcetam 1000 milliGRAM(s) Oral two times a day  losartan 25 milliGRAM(s) Oral daily  multivitamin 1 Tablet(s) Oral daily  nystatin Cream 1 Application(s) Topical two times a day  polyethylene glycol 3350 17 Gram(s) Oral daily  senna 2 Tablet(s) Oral at bedtime  thiamine 300 milliGRAM(s) Oral daily  triamcinolone 0.1% Cream 1 Application(s) Topical two times a day      PHYSICAL EXAM:  T(C): 36.5 (05-01-18 @ 17:02), Max: 36.7 (04-30-18 @ 19:37)  HR: 80 (05-01-18 @ 17:02) (75 - 127)  BP: 130/75 (05-01-18 @ 17:02) (111/76 - 134/79)  RR: 18 (05-01-18 @ 17:02) (18 - 18)  SpO2: 96% (05-01-18 @ 17:02) (95% - 96%)  Wt(kg): --  I&O's Summary    30 Apr 2018 07:01  -  01 May 2018 07:00  --------------------------------------------------------  IN: 370 mL / OUT: 0 mL / NET: 370 mL          Appearance: Normal	  HEENT:   Normal oral mucosa, PERRL, EOMI	  Cardiovascular: Normal S1 S2,    Murmur:   Neck: JVP normal  Respiratory: Lungs clear to auscultation  Gastrointestinal:  Soft, Non-tender, + BS	  Skin: normal   Neuro: No gross deficits.   Psychiatry:  Mood & affect appropriate  Ext: No edema      LABS/DATA:    CARDIAC MARKERS:                                11.3   9.32  )-----------( 397      ( 01 May 2018 07:53 )             33.2     05-01    139  |  100  |  15  ----------------------------<  150<H>  4.1   |  24  |  0.85    Ca    10.0      01 May 2018 06:37      proBNP:   Lipid Profile:   HgA1c: Hemoglobin A1C, Whole Blood: 5.2 % (05-01 @ 07:53)    TSH:     TELE:  EKG:  < from: TTE with Doppler (w/Cont) (05.01.18 @ 10:04) >  Conclusions:  1. Endocardial visualization enhanced with intravenous  injection of echo contrast (Definity).  Despite the use of  echo contrast the visualization endocardium was still  suboptimal. Overall left ventricular ejection fraction  appears grossly preserved, however, The basalinferior  wall, and the basal inferoseptum appear hypokinetic.  2. The right ventricle is not well visualized; grossly  probably  right ventricular systolic function.  *** No previous Echo exam.    < end of copied text >

## 2018-05-01 NOTE — DISCHARGE NOTE ADULT - CARE PLAN
Principal Discharge DX:	Meningitis after procedure  Goal:	s/p treatment with IV antibiotics for mechanical meningitis  Assessment and plan of treatment:	Make appointment for follow up with Dr Jacobo in 1-2 weeks. You may shower and wash your hair. The sutures have been removed from your incision. If your headaches return, go to ER.  Secondary Diagnosis:	Essential hypertension  Goal:	stable  Assessment and plan of treatment:	Continue labetolal and losartan. Prescriptions to be picked up at [facility] pharmacy. Please make an appointment with your Primary Care Physician in 1 week to monitor your blood pressure and your blood pressure medication. You will need to make an appointment with a cardiologist for cardiac workup.  Secondary Diagnosis:	Seizure  Goal:	stable  Assessment and plan of treatment:	Continue keppra to prevent seizures  Secondary Diagnosis:	New daily persistent headache  Goal:	stable  Assessment and plan of treatment:	Continue percocet as needed for pain then use tylenol for headaches as needed.

## 2018-05-01 NOTE — DISCHARGE NOTE ADULT - HOSPITAL COURSE
62 yo female POD 12 from right retrosigmoid for  vestibular schwanoma  resection in ED transfer from [place] after she was reportedly found down and confused by mother who called 911.  Mother was not available at bedside at [facility] ED nor is she available via phone at this time. At [place], per report, CSF showed , , glucose 98, protein 240. Febrile to 100.6, tachy to 156, 98% RA, 158/88.  Reportedly seized at OSH at was given 2 ativan    Pt admitted to [facility] and treaed with IV antibiotics for meningitis. Pt has limited social support at home and was kept in the hospital to complete treatment with IV antibiotics via PICC line. No neurosurgical intervention on this admission. Pt was followed by Infectious Disease. Pt treated with keppra for seizure per [facility] ER  report. During hospital course, Pt treated for severe headache with were eventually relieved with steroids and opiods. Pt was evaluated by Dermatology for erythematous excoriated papules on the bilateral lower back and buttocks. Treated for mild contact dermatitis.  On day of discharge pt was medically and neurologically stable for discharge to home with Home PT.         - HTN- continue labetalol and

## 2018-05-01 NOTE — DISCHARGE NOTE ADULT - ADDITIONAL INSTRUCTIONS
Any sign of fever, severe headache that does not improve with pain medication, nausea or vomiting, sleepiness or change in mental status, go to Emergency Room.

## 2018-05-01 NOTE — DISCHARGE NOTE ADULT - PATIENT PORTAL LINK FT
You can access the [facility], offered by [facility], by registering with the following website: [email or web address]

## 2018-05-01 NOTE — DISCHARGE NOTE ADULT - NS AS ACTIVITY OBS
Showering allowed/No Heavy lifting/straining/Walking-Outdoors allowed/Do not make important decisions/Do not drive or operate machinery/Stairs allowed/Walking-Indoors allowed

## 2018-05-01 NOTE — PROGRESS NOTE ADULT - SUBJECTIVE AND OBJECTIVE BOX
SUBJECTIVE: reports near resolution of severe headaches; will taper decadron    Vital Signs Last 24 Hrs  T(C): 36.4 (01 May 2018 11:31), Max: 36.8 (30 Apr 2018 15:56)  T(F): 97.5 (01 May 2018 11:31), Max: 98.2 (30 Apr 2018 15:56)  HR: 88 (01 May 2018 11:31) (75 - 127)  BP: 125/84 (01 May 2018 11:31) (111/76 - 134/79)  BP(mean): --  RR: 18 (01 May 2018 11:31) (18 - 18)  SpO2: 95% (01 May 2018 11:31) (95% - 96%)    PHYSICAL EXAM:    Constitutional: No Acute Distress, resting comfortably in bed after earlier    Neurological: Awake, alert, oriented to person, place and time, +right facial, speech slightly dysarthric, no drift, tongue midline, moving all extremities with 5/5 strength, sensation intact to light touch on all extremities    Incision: +right retrosigmoid incision site clean and dry without drainage; +suture in place C/D/I; slightly erythematous- likely local irritation    Pulmonary: Clear to Auscultation, No rales, No rhonchi, No wheezes     Cardiovascular: S1, S2, Regular rate and rhythm     Gastrointestinal: Soft, Non-tender, Non-distended     Extremities: No calf tenderness bilaterally; +left PICC in place, extremities warm      LABS:                             11.3   9.32  )-----------( 397      ( 01 May 2018 07:53 )             33.2   05-01    139  |  100  |  15  ----------------------------<  150<H>  4.1   |  24  |  0.85    Ca    10.0      01 May 2018 06:37      Vancomycin Level, Trough - Prior to Next Dose (04.30.18 @ 21:51)    Vancomycin Level, Trough: 21.6: Vancomycin trough levels should be rapidly reached and maintained at  15-20 ug/ml for life threatening MRSA  infections such as sepsis, endocarditis, osteomyelitis and pneumonia. A  first trough level should be drawn  before the 3rd or 4th dose.  Risk of renal toxicity is increased for levels >15 ug/ml, in patients on  other nephrotoxic drugs, who are  hemodynamically unstable, have unstable renal function, or are on  Vancomycin therapy for >14 days. Renal function with  creatinine levels should be monitored for those patients. ug/mL        IMAGING: < from: CT Head No Cont (04.27.18 @ 10:59) >  NTERPRETATION:  History: Status post craniotomy for tumor resection.    Multiple axial sections were performed from base of skull to vertex   without contrast enhancement. Sagittal reconstructions were performed as   well    This exam is compared with prior noncontrast head CT performed on April 19, 2018.    Postop changes compatible with a right suboccipital craniotomy is again   seen. Extra-axial fluid in thepostop region is again seen and measures   approximately 9.0 mm (previously measured approximately 8.1 mm.    Asymmetry of the lateral ventricles are seen with the left lateral   ventricle more prominent than the right.    There is no evidence of acute hemorrhage, mass or mass effect seen.    Bilateral maxillary sinus ethmoid and sphenoid sinus mucosal thickening   is seen.    Both mastoid and middle ear regions appear clear    Impression:    Postop changes are again seen as described above.    < end of copied text >    MEDICATIONS  (STANDING):  dexamethasone     Tablet 2 milliGRAM(s) Oral every 12 hours  dextrose 5%. 1000 milliLiter(s) (50 mL/Hr) IV Continuous <Continuous>  dextrose 50% Injectable 12.5 Gram(s) IV Push once  dextrose 50% Injectable 25 Gram(s) IV Push once  dextrose 50% Injectable 25 Gram(s) IV Push once  docusate sodium 100 milliGRAM(s) Oral three times a day  enoxaparin Injectable 40 milliGRAM(s) SubCutaneous <User Schedule>  folic acid 1 milliGRAM(s) Oral daily  insulin lispro (HumaLOG) corrective regimen sliding scale   SubCutaneous three times a day before meals  insulin lispro (HumaLOG) corrective regimen sliding scale   SubCutaneous at bedtime  labetalol 300 milliGRAM(s) Oral two times a day  levETIRAcetam 1000 milliGRAM(s) Oral two times a day  losartan 25 milliGRAM(s) Oral daily  multivitamin 1 Tablet(s) Oral daily  nystatin Cream 1 Application(s) Topical two times a day  polyethylene glycol 3350 17 Gram(s) Oral daily  senna 2 Tablet(s) Oral at bedtime  thiamine 300 milliGRAM(s) Oral daily  triamcinolone 0.1% Cream 1 Application(s) Topical two times a day  vancomycin  IVPB 1000 milliGRAM(s) IV Intermittent every 12 hours    MEDICATIONS  (PRN):  acetaminophen   Tablet. 650 milliGRAM(s) Oral every 6 hours PRN Mild Pain (1 - 3)  dextrose Gel 1 Dose(s) Oral once PRN Blood Glucose LESS THAN 70 milliGRAM(s)/deciliter  glucagon  Injectable 1 milliGRAM(s) IntraMuscular once PRN Glucose LESS THAN 70 milligrams/deciliter  ondansetron   Disintegrating Tablet 4 milliGRAM(s) Oral every 6 hours PRN Nausea  oxyCODONE    IR 10 milliGRAM(s) Oral every 3 hours PRN Moderate Pain (4 - 6)  oxyCODONE    IR 5 milliGRAM(s) Oral every 4 hours PRN Mild Pain (1 - 3)        DIET: regular diet

## 2018-05-01 NOTE — DISCHARGE NOTE ADULT - CARE PROVIDER_API CALL
Gayatri Jacobo), Neurosurgery  General  [address] [place] [address] Drive  [address] [place] [address]  Phone: [phone]  Fax: [phone]

## 2018-05-01 NOTE — PROGRESS NOTE ADULT - ASSESSMENT
NSVT   keep electrolytes in normal range  no evidence of MI  echo with questionable wma  cont BB  ischemic work up as outpt once deemed safe from neurological standpoint     meningitis   anbx  fu with ID    HTN  labile  cont current meds     HA  fu with head ct and neurology

## 2018-05-01 NOTE — DISCHARGE NOTE ADULT - PLAN OF CARE
s/p treatment with IV antibiotics for mechanical meningitis Make appointment for follow up with Dr Jacobo in 1-2 weeks. You may shower and wash your hair. The sutures have been removed from your incision. If your headaches return, go to ER. stable Continue labetolal and losartan. Prescriptions to be picked up at [facility] pharmacy. Please make an appointment with your Primary Care Physician in 1 week to monitor your blood pressure and your blood pressure medication. You will need to make an appointment with a cardiologist for cardiac workup. Continue keppra to prevent seizures Continue percocet as needed for pain then use tylenol for headaches as needed.

## 2018-05-01 NOTE — PROGRESS NOTE ADULT - SUBJECTIVE AND OBJECTIVE BOX
CC: f/u for    Patient reports    REVIEW OF SYSTEMS:  All other review of systems negative (Comprehensive ROS)    Antimicrobials Day #  :    Other Medications Reviewed    T(F): 97.5 (05-01-18 @ 11:31), Max: 98.2 (04-30-18 @ 15:56)  HR: 88 (05-01-18 @ 11:31)  BP: 125/84 (05-01-18 @ 11:31)  RR: 18 (05-01-18 @ 11:31)  SpO2: 95% (05-01-18 @ 11:31)  Wt(kg): --    PHYSICAL EXAM:  General: alert, no acute distress  Eyes:  anicteric, no conjunctival injection, no discharge  Oropharynx: no lesions or injection 	  Neck: supple, without adenopathy  Lungs: clear to auscultation  Heart: regular rate and rhythm; no murmur, rubs or gallops  Abdomen: soft, nondistended, nontender, without mass or organomegaly  Skin: no lesions  Extremities: no clubbing, cyanosis, or edema  Neurologic: alert, oriented, moves all extremities  Head wound is clean and intact  LAB RESULTS:                        11.3   9.32  )-----------( 397      ( 01 May 2018 07:53 )             33.2     05-01    139  |  100  |  15  ----------------------------<  150<H>  4.1   |  24  |  0.85    Ca    10.0      01 May 2018 06:37          MICROBIOLOGY:  RECENT CULTURES:  Culture - Blood (04.17.18 @ 19:16)    Specimen Source: .Blood Blood    Culture Results:   No growth at 5 days.        RADIOLOGY REVIEWED:  < from: CT Head No Cont (04.27.18 @ 10:59) >  XAM:  CT BRAIN                            PROCEDURE DATE:  04/27/2018            INTERPRETATION:  History: Status post craniotomy for tumor resection.    Multiple axial sections were performed from base of skull to vertex   without contrast enhancement. Sagittal reconstructions were performed as   well    This exam is compared with prior noncontrast head CT performed on April 19, 2018.    Postop changes compatible with a right suboccipital craniotomy is again   seen. Extra-axial fluid in thepostop region is again seen and measures   approximately 9.0 mm (previously measured approximately 8.1 mm.    Asymmetry of the lateral ventricles are seen with the left lateral   ventricle more prominent than the right.    There is no evidence of acute hemorrhage, mass or mass effect seen.    Bilateral maxillary sinus ethmoid and sphenoid sinus mucosal thickening   is seen.    Both mastoid and middle ear regions appear clear    Impression:    Postop changes are again seen as described above.        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

## 2018-05-01 NOTE — DISCHARGE NOTE ADULT - MEDICATION SUMMARY - MEDICATIONS TO TAKE
I will START or STAY ON the medications listed below when I get home from the hospital:    acetaminophen 325 mg oral tablet  -- 2 tab(s) by mouth every 6 hours, As needed, Mild Pain (1 - 3)  -- Indication: For Mild pain    oxyCODONE 5 mg oral tablet  -- 1 tab(s) by mouth every 6 hours, As Needed -Mild Pain (1 - 3) MDD:2 tablets   -- Indication: For Moderate pain    losartan 25 mg oral tablet  -- 1 tab(s) by mouth once a day  -- Indication: For blood pressure    levETIRAcetam 1000 mg oral tablet  -- 1 tab(s) by mouth 2 times a day  -- Indication: For Seizure    labetalol 300 mg oral tablet  -- 1 tab(s) by mouth 2 times a day  -- Indication: For blood pressure

## 2018-05-01 NOTE — PROGRESS NOTE ADULT - ASSESSMENT
HPI: 64 yo female s/p right retrosigmoid for vestibular schwannoma resection on 4/5/18 who was transferred from [facility] ED after she was reportedly found down and confused by mother who called 911.  Mother was not available at bedside at [facility] ED. At [place], per report, CSF showed , , glucose 98, protein 240. Febrile to 100.6, tachy to 156, 98% RA, 158/88.  Reportedly seized at OSH at was given 2 ativan.    PLAN:  -Continue Vancomycin 1g q12 via PICC for total of 14 days to complete tomorrow morning.  Patient has remained in the hospital for IV antibiotics as she has poor social support at home.   ID consult following  -Continue Keppra 1g BID for history of seizure upon admission  -Continue Labetalol 300 TID and Losartan for hx of HTN  -will change to oxycodone 10mg every 3hrs as needed for pain. no more IV pain meds.  -Continue Triamcinolone cream BID per derm for erythematous excoriated papules on lower back and buttocks. .   -Continue colace, senna for bowel regimen  -Encouraged mobilization  -Encouraged incentive spirometer  -DVT prophylaxis: SQL, venodynes  -Hospitalist Medicine and cardiology following in consult  -Dispo: home PT/OT w/ straight cane  TTE to be done today, will follow up results  anticipate d/c home tomorrow after am dose of vancomycin     [id number]

## 2018-05-02 VITALS
OXYGEN SATURATION: 96 % | DIASTOLIC BLOOD PRESSURE: 96 MMHG | HEART RATE: 78 BPM | SYSTOLIC BLOOD PRESSURE: 136 MMHG | TEMPERATURE: 98 F | RESPIRATION RATE: 20 BRPM

## 2018-05-02 LAB
GLUCOSE BLDC GLUCOMTR-MCNC: 118 MG/DL — HIGH (ref 70–99)
GLUCOSE BLDC GLUCOMTR-MCNC: 135 MG/DL — HIGH (ref 70–99)

## 2018-05-02 PROCEDURE — 99239 HOSP IP/OBS DSCHRG MGMT >30: CPT

## 2018-05-02 RX ORDER — ACETAMINOPHEN 500 MG
2 TABLET ORAL
Qty: 0 | Refills: 0 | COMMUNITY
Start: 2018-05-02

## 2018-05-02 RX ORDER — LEVETIRACETAM 250 MG/1
1 TABLET, FILM COATED ORAL
Qty: 60 | Refills: 0 | OUTPATIENT
Start: 2018-05-02 | End: 2018-05-31

## 2018-05-02 RX ORDER — LOSARTAN POTASSIUM 100 MG/1
1 TABLET, FILM COATED ORAL
Qty: 30 | Refills: 0 | OUTPATIENT
Start: 2018-05-02

## 2018-05-02 RX ORDER — LOSARTAN POTASSIUM 100 MG/1
1 TABLET, FILM COATED ORAL
Qty: 0 | Refills: 0 | COMMUNITY
Start: 2018-05-02

## 2018-05-02 RX ORDER — LABETALOL HCL 100 MG
1 TABLET ORAL
Qty: 60 | Refills: 0 | OUTPATIENT
Start: 2018-05-02

## 2018-05-02 RX ORDER — LABETALOL HCL 100 MG
1 TABLET ORAL
Qty: 0 | Refills: 0 | COMMUNITY
Start: 2018-05-02

## 2018-05-02 RX ORDER — OXYCODONE HYDROCHLORIDE 5 MG/1
1 TABLET ORAL
Qty: 10 | Refills: 0 | OUTPATIENT
Start: 2018-05-02

## 2018-05-02 RX ADMIN — LEVETIRACETAM 1000 MILLIGRAM(S): 250 TABLET, FILM COATED ORAL at 05:40

## 2018-05-02 RX ADMIN — Medication 2 MILLIGRAM(S): at 05:40

## 2018-05-02 RX ADMIN — Medication 1 APPLICATION(S): at 05:40

## 2018-05-02 RX ADMIN — Medication 1 TABLET(S): at 12:34

## 2018-05-02 RX ADMIN — Medication 1 MILLIGRAM(S): at 12:34

## 2018-05-02 RX ADMIN — OXYCODONE HYDROCHLORIDE 10 MILLIGRAM(S): 5 TABLET ORAL at 12:35

## 2018-05-02 RX ADMIN — Medication 300 MILLIGRAM(S): at 05:40

## 2018-05-02 RX ADMIN — NYSTATIN CREAM 1 APPLICATION(S): 100000 CREAM TOPICAL at 05:41

## 2018-05-02 RX ADMIN — Medication 100 MILLIGRAM(S): at 12:35

## 2018-05-02 RX ADMIN — Medication 300 MILLIGRAM(S): at 12:34

## 2018-05-02 RX ADMIN — LOSARTAN POTASSIUM 25 MILLIGRAM(S): 100 TABLET, FILM COATED ORAL at 05:40

## 2018-05-02 NOTE — PROGRESS NOTE ADULT - SUBJECTIVE AND OBJECTIVE BOX
SUBJECTIVE: Pt seen and examined, reports intermittent mild headache    OVERNIGHT EVENTS: none    Vital Signs Last 24 Hrs  T(C): 36.6 (02 May 2018 11:45), Max: 36.7 (02 May 2018 04:32)  T(F): 97.9 (02 May 2018 11:45), Max: 98 (02 May 2018 04:32)  HR: 78 (02 May 2018 11:45) (65 - 89)  BP: 136/96 (02 May 2018 11:45) (126/84 - 157/94)  BP(mean): --  RR: 20 (02 May 2018 11:45) (18 - 20)  SpO2: 96% (02 May 2018 11:45) (94% - 96%)    PHYSICAL EXAM:    General: No Acute Distress     Neurological: Awake, alert, oriented to person, place and time, +right facial, speech slightly dysarthric, no drift, tongue midline, moving all extremities with 5/5 strength, sensation intact to light touch on all extremities    Pulmonary: Clear to Auscultation, No Rales, No Rhonchi, No Wheezes     Cardiovascular: S1, S2, Regular Rate and Rhythm     Gastrointestinal: Soft, Nontender, Nondistended     Incision: rt retrosigmoid incision well healed, soft slight fluctuant collection    LABS:                        11.3   9.32  )-----------( 397      ( 01 May 2018 07:53 )             33.2    05-01    139  |  100  |  15  ----------------------------<  150<H>  4.1   |  24  |  0.85    Ca    10.0      01 May 2018 06:37      Hemoglobin A1C, Whole Blood: 5.2 % (05-01 @ 07:53)      05-01 @ 07:01  -  05-02 @ 07:00  --------------------------------------------------------  IN: 360 mL / OUT: 0 mL / NET: 360 mL    05-02 @ 07:01  -  05-02 @ 12:49  --------------------------------------------------------  IN: 300 mL / OUT: 0 mL / NET: 300 mL      DRAINS: none    MEDICATIONS:  Antibiotics:    Neuro:  acetaminophen   Tablet. 650 milliGRAM(s) Oral every 6 hours PRN Mild Pain (1 - 3)  levETIRAcetam 1000 milliGRAM(s) Oral two times a day  ondansetron   Disintegrating Tablet 4 milliGRAM(s) Oral every 6 hours PRN Nausea  oxyCODONE    IR 10 milliGRAM(s) Oral every 3 hours PRN Moderate Pain (4 - 6)  oxyCODONE    IR 5 milliGRAM(s) Oral every 4 hours PRN Mild Pain (1 - 3)    Cardiac:  labetalol 300 milliGRAM(s) Oral two times a day  losartan 25 milliGRAM(s) Oral daily    Pulm:    GI/:  docusate sodium 100 milliGRAM(s) Oral three times a day  polyethylene glycol 3350 17 Gram(s) Oral daily  senna 2 Tablet(s) Oral at bedtime    Other:   dextrose 5%. 1000 milliLiter(s) IV Continuous <Continuous>  dextrose 50% Injectable 12.5 Gram(s) IV Push once  dextrose 50% Injectable 25 Gram(s) IV Push once  dextrose 50% Injectable 25 Gram(s) IV Push once  dextrose Gel 1 Dose(s) Oral once PRN Blood Glucose LESS THAN 70 milliGRAM(s)/deciliter  enoxaparin Injectable 40 milliGRAM(s) SubCutaneous <User Schedule>  folic acid 1 milliGRAM(s) Oral daily  glucagon  Injectable 1 milliGRAM(s) IntraMuscular once PRN Glucose LESS THAN 70 milligrams/deciliter  insulin lispro (HumaLOG) corrective regimen sliding scale   SubCutaneous three times a day before meals  insulin lispro (HumaLOG) corrective regimen sliding scale   SubCutaneous at bedtime  multivitamin 1 Tablet(s) Oral daily  nystatin Cream 1 Application(s) Topical two times a day  thiamine 300 milliGRAM(s) Oral daily  triamcinolone 0.1% Cream 1 Application(s) Topical two times a day    DIET: [x] Regular [] CCD [] Renal [] Puree [] Dysphagia [] Tube Feeds:     IMAGING:   < from: CT Head No Cont (04.27.18 @ 10:59) >  This exam is compared with prior noncontrast head CT performed on April 19, 2018.    Postop changes compatible with a right suboccipital craniotomy is again   seen. Extra-axial fluid in thepostop region is again seen and measures   approximately 9.0 mm (previously measured approximately 8.1 mm.    Asymmetry of the lateral ventricles are seen with the left lateral   ventricle more prominent than the right.    There is no evidence of acute hemorrhage, mass or mass effect seen.    Bilateral maxillary sinus ethmoid and sphenoid sinus mucosal thickening   is seen.    Both mastoid and middle ear regions appear clear    Impression:    Postop changes are again seen as described above.    < end of copied text >

## 2018-05-02 NOTE — PROGRESS NOTE ADULT - SUBJECTIVE AND OBJECTIVE BOX
Subjective: Patient seen and examined. No new events except as noted.     SUBJECTIVE/ROS:  No chest pain, dyspnea, palpitation, or dizziness.       MEDICATIONS:  MEDICATIONS  (STANDING):  dextrose 5%. 1000 milliLiter(s) (50 mL/Hr) IV Continuous <Continuous>  dextrose 50% Injectable 12.5 Gram(s) IV Push once  dextrose 50% Injectable 25 Gram(s) IV Push once  dextrose 50% Injectable 25 Gram(s) IV Push once  docusate sodium 100 milliGRAM(s) Oral three times a day  enoxaparin Injectable 40 milliGRAM(s) SubCutaneous <User Schedule>  folic acid 1 milliGRAM(s) Oral daily  insulin lispro (HumaLOG) corrective regimen sliding scale   SubCutaneous three times a day before meals  insulin lispro (HumaLOG) corrective regimen sliding scale   SubCutaneous at bedtime  labetalol 300 milliGRAM(s) Oral two times a day  levETIRAcetam 1000 milliGRAM(s) Oral two times a day  losartan 25 milliGRAM(s) Oral daily  multivitamin 1 Tablet(s) Oral daily  nystatin Cream 1 Application(s) Topical two times a day  polyethylene glycol 3350 17 Gram(s) Oral daily  senna 2 Tablet(s) Oral at bedtime  thiamine 300 milliGRAM(s) Oral daily  triamcinolone 0.1% Cream 1 Application(s) Topical two times a day      PHYSICAL EXAM:  T(C): 36.6 (05-02-18 @ 11:45), Max: 36.7 (05-02-18 @ 04:32)  HR: 78 (05-02-18 @ 11:45) (65 - 89)  BP: 136/96 (05-02-18 @ 11:45) (126/84 - 157/94)  RR: 20 (05-02-18 @ 11:45) (18 - 20)  SpO2: 96% (05-02-18 @ 11:45) (94% - 96%)  Wt(kg): --  I&O's Summary    01 May 2018 07:01  -  02 May 2018 07:00  --------------------------------------------------------  IN: 360 mL / OUT: 0 mL / NET: 360 mL    02 May 2018 07:01  -  02 May 2018 15:23  --------------------------------------------------------  IN: 540 mL / OUT: 300 mL / NET: 240 mL          Appearance: Normal	  HEENT:   Normal oral mucosa, PERRL, EOMI	  Cardiovascular: Normal S1 S2,    Murmur:   Neck: JVP normal  Respiratory: Lungs clear to auscultation  Gastrointestinal:  Soft, Non-tender, + BS	  Skin: normal   Neuro: No gross deficits.   Psychiatry:  Mood & affect appropriate  Ext: No edema      LABS/DATA:    CARDIAC MARKERS:                                11.3   9.32  )-----------( 397      ( 01 May 2018 07:53 )             33.2     05-01    139  |  100  |  15  ----------------------------<  150<H>  4.1   |  24  |  0.85    Ca    10.0      01 May 2018 06:37      proBNP:   Lipid Profile:   HgA1c:   TSH:     TELE:  EKG:

## 2018-05-02 NOTE — PROGRESS NOTE ADULT - ASSESSMENT
62 yo female POD 12 from right retrosig VS resection in ED transfer from [place] after she was reportedly found down and confused by mother who called 911.  Mother was not available at bedside at [facility] ED nor is she available via phone at this time. At [place], per report, CSF showed , , glucose 98, protein 240. Febrile to 100.6, tachy to 156, 98% RA, 158/88.  Reportedly seized at OSH at was given 2 ativan    PLAN:  Neuro:   - no neurosurgical intervention on this admission  - meningitis-vancomycin completed, dc PICC prior to discharge   - continue keppra for seizure  - pain control- continue oxycodone   - decadron completed- headache much improved  CV:  - HTN- continue labetalol and losartan  DVT ppx:   - venodynes while in bed, chemoprophylaxis  PT/OT:   - Home PT/OT  - pt has poor social support at home     More than 30 minutes spent on total encounter: more than 50% of the visit was spent on educating the patient and family regarding condition, medications, follow up plans, signs and symptoms to be concerned with, preparing paperwork, and questions answered regarding discharge.      CheckInPage # [id number]

## 2018-05-02 NOTE — PROGRESS NOTE ADULT - PROVIDER SPECIALTY LIST ADULT
Cardiology
Hospitalist
Infectious Disease
Intervent Radiology
NSICU
Neurosurgery
Infectious Disease
NSICU
Neurosurgery
Neurosurgery
Cardiology
Infectious Disease
Neurosurgery
Neurosurgery
Hospitalist
Hospitalist

## 2018-05-10 ENCOUNTER — INPATIENT (INPATIENT)
Facility: HOSPITAL | Age: 63
LOS: 0 days | Discharge: HOME | End: 2018-05-11
Payer: MEDICAID

## 2018-05-10 ENCOUNTER — OUTPATIENT (OUTPATIENT)
Dept: OUTPATIENT SERVICES | Facility: HOSPITAL | Age: 63
LOS: 1 days | End: 2018-05-10

## 2018-05-10 DIAGNOSIS — Z98.891 HISTORY OF UTERINE SCAR FROM PREVIOUS SURGERY: Chronic | ICD-10-CM

## 2018-05-10 DIAGNOSIS — Z98.890 OTHER SPECIFIED POSTPROCEDURAL STATES: Chronic | ICD-10-CM

## 2018-05-10 PROCEDURE — 71045 X-RAY EXAM CHEST 1 VIEW: CPT | Mod: 26

## 2018-05-10 PROCEDURE — 70450 CT HEAD/BRAIN W/O DYE: CPT | Mod: 26

## 2018-05-10 PROCEDURE — 99285 EMERGENCY DEPT VISIT HI MDM: CPT

## 2018-05-11 ENCOUNTER — OUTPATIENT (OUTPATIENT)
Dept: OUTPATIENT SERVICES | Facility: HOSPITAL | Age: 63
LOS: 1 days | End: 2018-05-11

## 2018-05-11 DIAGNOSIS — Z98.891 HISTORY OF UTERINE SCAR FROM PREVIOUS SURGERY: Chronic | ICD-10-CM

## 2018-05-11 DIAGNOSIS — Z98.890 OTHER SPECIFIED POSTPROCEDURAL STATES: Chronic | ICD-10-CM

## 2018-05-11 PROCEDURE — 93880 EXTRACRANIAL BILAT STUDY: CPT | Mod: 26

## 2018-05-14 PROBLEM — D36.10 BENIGN NEOPLASM OF PERIPHERAL NERVES AND AUTONOMIC NERVOUS SYSTEM, UNSPECIFIED: Chronic | Status: ACTIVE | Noted: 2018-04-17

## 2018-05-17 ENCOUNTER — OTHER (OUTPATIENT)
Age: 63
End: 2018-05-17

## 2018-05-18 ENCOUNTER — APPOINTMENT (OUTPATIENT)
Dept: NEUROSURGERY | Facility: CLINIC | Age: 63
End: 2018-05-18
Payer: MEDICARE

## 2018-05-18 VITALS
SYSTOLIC BLOOD PRESSURE: 121 MMHG | TEMPERATURE: 98.1 F | BODY MASS INDEX: 26.68 KG/M2 | OXYGEN SATURATION: 95 % | WEIGHT: 170 LBS | DIASTOLIC BLOOD PRESSURE: 82 MMHG | HEIGHT: 67 IN | HEART RATE: 92 BPM

## 2018-05-18 DIAGNOSIS — R29.810 FACIAL WEAKNESS: ICD-10-CM

## 2018-05-18 DIAGNOSIS — D33.3 BENIGN NEOPLASM OF CRANIAL NERVES: ICD-10-CM

## 2018-05-18 PROCEDURE — 99024 POSTOP FOLLOW-UP VISIT: CPT

## 2018-05-18 RX ORDER — LEVETIRACETAM 1000 MG/1
TABLET, FILM COATED ORAL
Refills: 0 | Status: ACTIVE | COMMUNITY

## 2018-05-18 RX ORDER — LOSARTAN POTASSIUM 25 MG/1
25 TABLET, FILM COATED ORAL
Refills: 0 | Status: ACTIVE | COMMUNITY

## 2018-06-29 RX ORDER — LEVETIRACETAM 1000 MG/1
1000 TABLET, FILM COATED ORAL TWICE DAILY
Qty: 60 | Refills: 0 | Status: ACTIVE | COMMUNITY
Start: 2018-06-29 | End: 1900-01-01

## 2018-07-15 ENCOUNTER — OUTPATIENT (OUTPATIENT)
Dept: OUTPATIENT SERVICES | Facility: HOSPITAL | Age: 63
LOS: 1 days | End: 2018-07-15

## 2018-07-15 ENCOUNTER — INPATIENT (INPATIENT)
Facility: HOSPITAL | Age: 63
LOS: 2 days | Discharge: HOME | End: 2018-07-18
Payer: MEDICAID

## 2018-07-15 DIAGNOSIS — Z98.891 HISTORY OF UTERINE SCAR FROM PREVIOUS SURGERY: Chronic | ICD-10-CM

## 2018-07-15 DIAGNOSIS — Z98.890 OTHER SPECIFIED POSTPROCEDURAL STATES: Chronic | ICD-10-CM

## 2018-07-15 PROCEDURE — 70450 CT HEAD/BRAIN W/O DYE: CPT | Mod: 26

## 2018-07-15 PROCEDURE — 99291 CRITICAL CARE FIRST HOUR: CPT

## 2018-07-15 PROCEDURE — 71045 X-RAY EXAM CHEST 1 VIEW: CPT | Mod: 26

## 2018-07-15 PROCEDURE — 76705 ECHO EXAM OF ABDOMEN: CPT | Mod: 26

## 2018-07-16 ENCOUNTER — OUTPATIENT (OUTPATIENT)
Dept: OUTPATIENT SERVICES | Facility: HOSPITAL | Age: 63
LOS: 1 days | End: 2018-07-16

## 2018-07-16 ENCOUNTER — APPOINTMENT (OUTPATIENT)
Dept: NEUROSURGERY | Facility: CLINIC | Age: 63
End: 2018-07-16

## 2018-07-16 DIAGNOSIS — Z98.891 HISTORY OF UTERINE SCAR FROM PREVIOUS SURGERY: Chronic | ICD-10-CM

## 2018-07-16 DIAGNOSIS — Z98.890 OTHER SPECIFIED POSTPROCEDURAL STATES: Chronic | ICD-10-CM

## 2018-07-16 PROCEDURE — 70553 MRI BRAIN STEM W/O & W/DYE: CPT | Mod: 26

## 2018-07-17 ENCOUNTER — OUTPATIENT (OUTPATIENT)
Dept: OUTPATIENT SERVICES | Facility: HOSPITAL | Age: 63
LOS: 1 days | End: 2018-07-17

## 2018-07-17 DIAGNOSIS — Z98.890 OTHER SPECIFIED POSTPROCEDURAL STATES: Chronic | ICD-10-CM

## 2018-07-17 DIAGNOSIS — Z98.891 HISTORY OF UTERINE SCAR FROM PREVIOUS SURGERY: Chronic | ICD-10-CM

## 2018-07-18 ENCOUNTER — OUTPATIENT (OUTPATIENT)
Dept: OUTPATIENT SERVICES | Facility: HOSPITAL | Age: 63
LOS: 1 days | End: 2018-07-18

## 2018-07-18 DIAGNOSIS — Z98.890 OTHER SPECIFIED POSTPROCEDURAL STATES: Chronic | ICD-10-CM

## 2018-07-18 DIAGNOSIS — Z98.891 HISTORY OF UTERINE SCAR FROM PREVIOUS SURGERY: Chronic | ICD-10-CM

## 2018-07-20 ENCOUNTER — OUTPATIENT (OUTPATIENT)
Dept: OUTPATIENT SERVICES | Facility: HOSPITAL | Age: 63
LOS: 1 days | End: 2018-07-20

## 2018-07-20 ENCOUNTER — INPATIENT (INPATIENT)
Facility: HOSPITAL | Age: 63
LOS: 1 days | Discharge: HOME | End: 2018-07-22
Payer: MEDICAID

## 2018-07-20 DIAGNOSIS — Z98.890 OTHER SPECIFIED POSTPROCEDURAL STATES: Chronic | ICD-10-CM

## 2018-07-20 DIAGNOSIS — Z98.891 HISTORY OF UTERINE SCAR FROM PREVIOUS SURGERY: Chronic | ICD-10-CM

## 2018-07-20 PROCEDURE — 71046 X-RAY EXAM CHEST 2 VIEWS: CPT | Mod: 26

## 2018-07-20 PROCEDURE — 70450 CT HEAD/BRAIN W/O DYE: CPT | Mod: 26

## 2018-07-20 PROCEDURE — 99284 EMERGENCY DEPT VISIT MOD MDM: CPT

## 2018-07-21 ENCOUNTER — OUTPATIENT (OUTPATIENT)
Dept: OUTPATIENT SERVICES | Facility: HOSPITAL | Age: 63
LOS: 1 days | End: 2018-07-21

## 2018-07-21 DIAGNOSIS — Z98.891 HISTORY OF UTERINE SCAR FROM PREVIOUS SURGERY: Chronic | ICD-10-CM

## 2018-07-21 DIAGNOSIS — Z98.890 OTHER SPECIFIED POSTPROCEDURAL STATES: Chronic | ICD-10-CM

## 2018-07-21 PROCEDURE — 99253 IP/OBS CNSLTJ NEW/EST LOW 45: CPT

## 2018-07-21 PROCEDURE — 76705 ECHO EXAM OF ABDOMEN: CPT | Mod: 26

## 2018-07-22 ENCOUNTER — OUTPATIENT (OUTPATIENT)
Dept: OUTPATIENT SERVICES | Facility: HOSPITAL | Age: 63
LOS: 1 days | End: 2018-07-22

## 2018-07-22 DIAGNOSIS — Z98.890 OTHER SPECIFIED POSTPROCEDURAL STATES: Chronic | ICD-10-CM

## 2018-07-22 DIAGNOSIS — Z98.891 HISTORY OF UTERINE SCAR FROM PREVIOUS SURGERY: Chronic | ICD-10-CM

## 2018-07-26 PROBLEM — D33.3 BENIGN NEOPLASM OF CRANIAL NERVES: Chronic | Status: ACTIVE | Noted: 2018-04-03

## 2018-07-26 PROBLEM — I10 ESSENTIAL (PRIMARY) HYPERTENSION: Chronic | Status: ACTIVE | Noted: 2018-04-03

## 2018-07-26 PROBLEM — C50.919 MALIGNANT NEOPLASM OF UNSPECIFIED SITE OF UNSPECIFIED FEMALE BREAST: Chronic | Status: ACTIVE | Noted: 2018-04-03

## 2018-07-26 PROBLEM — H91.90 UNSPECIFIED HEARING LOSS, UNSPECIFIED EAR: Chronic | Status: ACTIVE | Noted: 2018-04-03

## 2018-07-30 ENCOUNTER — APPOINTMENT (OUTPATIENT)
Dept: NEUROSURGERY | Facility: CLINIC | Age: 63
End: 2018-07-30

## 2019-08-18 NOTE — PATIENT PROFILE ADULT. - INFORMATION COULD NOT BE OBTAINED DETAILS
bassam - pt obtundent

## 2023-04-28 NOTE — DISCHARGE NOTE ADULT - PROVIDER RX CONTACT NUMBER
Chief Complaint   Patient presents with     Allergy Consult     Jessica is here today for patch testing consult     Huyen Ramirez RN     [phone]

## 2023-06-15 NOTE — ED ADULT NURSE NOTE - PAIN RATING/NUMBER SCALE (0-10): ACTIVITY
Rubi   0 normal

## 2023-07-01 NOTE — H&P PST ADULT - SUBSTANCE USE COMMENT, PROFILE
Was called by RN due to patient wanting to sign out AMA. Asked patient why she wanted to leave, reports that she wants to go home and cannot stay here any longer.    Patient is AAO x 3. I explained at length to the patient the risks of signing out AMA including but not limited to harm, worsening of symptoms,  injury including possible  death. I explained the risks, benefits and alternatives to treatment as well as the patient's  risks of refusing treatment at this time. I offered to answer any questions and fully answered any such questions. We believe that the patient fully understands what has been explained and verbalized understanding.  I informed hospitalist Dr. Castro of this, aware. Patient signed form to sign out AMA and accepts responsibility for any and all results of this decision.      Raiza Gutierrez, NP-C  Department of Medicine denies any street drug use

## 2024-01-18 NOTE — PATIENT PROFILE ADULT. - NS PRO ABUSE SCREEN AFRAID ANYONE YN
Patient is pending with [facility] for RN and PT services. Informed ML Marques. Updated AVS with contact information    no

## 2024-12-31 NOTE — PATIENT PROFILE ADULT. - BLOOD AVOIDANCE/RESTRICTIONS, PROFILE
Spoke with PCP about patient's longstanding insomnia. Discussed trying sleep study to rule out any underlying HELDER. Discussed with patient via telephone, who is agreeable and would prefer sleep study at main campus. Will place referral now. All questions answered. Encouraged to follow up with any concerns.    bassam - pt obtundent

## 2025-03-19 NOTE — PATIENT PROFILE ADULT. - CAREGIVER OBTAIN DETAILS
[de-identified] : 2/12/25[facility]  bassam - pt obtundent